# Patient Record
Sex: FEMALE | Race: WHITE | NOT HISPANIC OR LATINO | Employment: FULL TIME | URBAN - METROPOLITAN AREA
[De-identification: names, ages, dates, MRNs, and addresses within clinical notes are randomized per-mention and may not be internally consistent; named-entity substitution may affect disease eponyms.]

---

## 2017-02-09 ENCOUNTER — TELEPHONE (OUTPATIENT)
Dept: ONCOLOGY | Facility: CLINIC | Age: 37
End: 2017-02-09

## 2017-02-09 ENCOUNTER — TRANSFERRED RECORDS (OUTPATIENT)
Dept: HEALTH INFORMATION MANAGEMENT | Facility: CLINIC | Age: 37
End: 2017-02-09

## 2017-02-09 DIAGNOSIS — R07.0 THROAT PAIN: Primary | ICD-10-CM

## 2017-02-09 LAB
BACTERIA SPEC CULT: NORMAL
DEPRECATED S PYO AG THROAT QL EIA: NORMAL
MICRO REPORT STATUS: NORMAL
MICRO REPORT STATUS: NORMAL
SPECIMEN SOURCE: NORMAL
SPECIMEN SOURCE: NORMAL

## 2017-02-09 NOTE — TELEPHONE ENCOUNTER
RN called patient back and instructed; Dr Nichole would like to do an exam and lab work prior to prescribing an antibiotic. Could double book her anytime today. Patient unable to come in today but will go to PCP or urgent care after work. Discussed the importance of being seen today because having an infection while neutropenic can be life threatening and this is why Dr Nichole would like her seen today. Also advised per Dr Nichole she should have Neupogen again tomorrow. Advised if she develops a fever she should go immediately to the hospital.  Patient expressed understanding and will call back tomorrow morning with update or if she needs to make appointment.   Flower Hilton  RN, BSN, OCN

## 2017-02-09 NOTE — TELEPHONE ENCOUNTER
Patient calling to report she has a sore throat which started yesterday. Children diagnosed with strep throat. Denies fever/chills.  Patient gave herself neupogen injection this morning. She has been taking neupogen weekly but skipped last week. She is wondering if Dr Nichole will prescribe her an antibiotic or does she need to be seen by Dr ARREDONDO or PCP?  Instructed patient this RN would message Dr Nichole and call her back with recommendation.  Flower Hilton  RN, BSN, OCN

## 2017-02-10 NOTE — TELEPHONE ENCOUNTER
Patient reports she saw a provider yesterday for throat culture and cbcd. Calling to request fax number to provide results from yesterday. States rapid strep was negative but provider thought she could smell the infection and provided patient an antibiotic. Patient reports she will take another neupogen injection this morning. RN will provide faxed reports to Dr Dayanara Hilton  RN, BSN, OCN

## 2017-02-14 ENCOUNTER — TELEPHONE (OUTPATIENT)
Dept: ONCOLOGY | Facility: CLINIC | Age: 37
End: 2017-02-14

## 2017-02-14 ENCOUNTER — RADIANT APPOINTMENT (OUTPATIENT)
Dept: GENERAL RADIOLOGY | Facility: CLINIC | Age: 37
End: 2017-02-14
Attending: NURSE PRACTITIONER
Payer: COMMERCIAL

## 2017-02-14 ENCOUNTER — ONCOLOGY VISIT (OUTPATIENT)
Dept: ONCOLOGY | Facility: CLINIC | Age: 37
End: 2017-02-14
Payer: COMMERCIAL

## 2017-02-14 VITALS
OXYGEN SATURATION: 97 % | TEMPERATURE: 101.3 F | WEIGHT: 157 LBS | BODY MASS INDEX: 24.64 KG/M2 | DIASTOLIC BLOOD PRESSURE: 75 MMHG | SYSTOLIC BLOOD PRESSURE: 114 MMHG | HEIGHT: 67 IN | RESPIRATION RATE: 20 BRPM | HEART RATE: 110 BPM

## 2017-02-14 DIAGNOSIS — R50.9 FEVER, UNSPECIFIED: Primary | ICD-10-CM

## 2017-02-14 DIAGNOSIS — D70.8 AUTOIMMUNE NEUTROPENIA (H): ICD-10-CM

## 2017-02-14 DIAGNOSIS — R05.9 COUGH: ICD-10-CM

## 2017-02-14 LAB
ALBUMIN SERPL-MCNC: 3.8 G/DL (ref 3.4–5)
ALBUMIN UR-MCNC: 10 MG/DL
ALP SERPL-CCNC: 71 U/L (ref 40–150)
ALT SERPL W P-5'-P-CCNC: 25 U/L (ref 0–50)
ANION GAP SERPL CALCULATED.3IONS-SCNC: 7 MMOL/L (ref 3–14)
APPEARANCE UR: ABNORMAL
AST SERPL W P-5'-P-CCNC: 17 U/L (ref 0–45)
BACTERIA #/AREA URNS HPF: ABNORMAL /HPF
BASOPHILS # BLD AUTO: 0 10E9/L (ref 0–0.2)
BASOPHILS NFR BLD AUTO: 1 %
BILIRUB SERPL-MCNC: 0.6 MG/DL (ref 0.2–1.3)
BILIRUB UR QL STRIP: NEGATIVE
BUN SERPL-MCNC: 6 MG/DL (ref 7–30)
CALCIUM SERPL-MCNC: 8.4 MG/DL (ref 8.5–10.1)
CHLORIDE SERPL-SCNC: 106 MMOL/L (ref 94–109)
CO2 SERPL-SCNC: 28 MMOL/L (ref 20–32)
COLOR UR AUTO: YELLOW
CREAT SERPL-MCNC: 0.63 MG/DL (ref 0.52–1.04)
DIFFERENTIAL METHOD BLD: ABNORMAL
ERYTHROCYTE [DISTWIDTH] IN BLOOD BY AUTOMATED COUNT: 13 % (ref 10–15)
GFR SERPL CREATININE-BSD FRML MDRD: ABNORMAL ML/MIN/1.7M2
GLUCOSE SERPL-MCNC: 108 MG/DL (ref 70–99)
GLUCOSE UR STRIP-MCNC: NEGATIVE MG/DL
HCT VFR BLD AUTO: 38.3 % (ref 35–47)
HGB BLD-MCNC: 12.8 G/DL (ref 11.7–15.7)
HGB UR QL STRIP: ABNORMAL
KETONES UR STRIP-MCNC: 10 MG/DL
LEUKOCYTE ESTERASE UR QL STRIP: NEGATIVE
LYMPHOCYTES # BLD AUTO: 0.5 10E9/L (ref 0.8–5.3)
LYMPHOCYTES NFR BLD AUTO: 40 %
MCH RBC QN AUTO: 30.1 PG (ref 26.5–33)
MCHC RBC AUTO-ENTMCNC: 33.4 G/DL (ref 31.5–36.5)
MCV RBC AUTO: 90 FL (ref 78–100)
METAMYELOCYTES # BLD: 0 10E9/L
METAMYELOCYTES NFR BLD MANUAL: 2 %
MONOCYTES # BLD AUTO: 0.6 10E9/L (ref 0–1.3)
MONOCYTES NFR BLD AUTO: 43 %
MUCOUS THREADS #/AREA URNS LPF: PRESENT /LPF
NEUTROPHILS # BLD AUTO: 0.2 10E9/L (ref 1.6–8.3)
NEUTROPHILS NFR BLD AUTO: 14 %
NITRATE UR QL: NEGATIVE
NON-SQ EPI CELLS #/AREA URNS LPF: ABNORMAL /LPF
PH UR STRIP: 6 PH (ref 5–7)
PLATELET # BLD AUTO: 168 10E9/L (ref 150–450)
POTASSIUM SERPL-SCNC: 3.4 MMOL/L (ref 3.4–5.3)
PROT SERPL-MCNC: 7.3 G/DL (ref 6.8–8.8)
RBC # BLD AUTO: 4.25 10E12/L (ref 3.8–5.2)
RBC #/AREA URNS AUTO: ABNORMAL /HPF (ref 0–2)
SODIUM SERPL-SCNC: 141 MMOL/L (ref 133–144)
SP GR UR STRIP: 1.02 (ref 1–1.03)
URN SPEC COLLECT METH UR: ABNORMAL
UROBILINOGEN UR STRIP-MCNC: NORMAL MG/DL (ref 0–2)
WBC # BLD AUTO: 1.3 10E9/L (ref 4–11)
WBC #/AREA URNS AUTO: ABNORMAL /HPF (ref 0–2)

## 2017-02-14 PROCEDURE — 99215 OFFICE O/P EST HI 40 MIN: CPT | Mod: 25 | Performed by: NURSE PRACTITIONER

## 2017-02-14 PROCEDURE — 94640 AIRWAY INHALATION TREATMENT: CPT

## 2017-02-14 PROCEDURE — 36415 COLL VENOUS BLD VENIPUNCTURE: CPT | Performed by: INTERNAL MEDICINE

## 2017-02-14 PROCEDURE — 80053 COMPREHEN METABOLIC PANEL: CPT | Performed by: NURSE PRACTITIONER

## 2017-02-14 PROCEDURE — 87040 BLOOD CULTURE FOR BACTERIA: CPT | Performed by: NURSE PRACTITIONER

## 2017-02-14 PROCEDURE — 81001 URINALYSIS AUTO W/SCOPE: CPT | Performed by: NURSE PRACTITIONER

## 2017-02-14 PROCEDURE — 85025 COMPLETE CBC W/AUTO DIFF WBC: CPT | Performed by: INTERNAL MEDICINE

## 2017-02-14 PROCEDURE — 71020 XR CHEST 2 VW: CPT

## 2017-02-14 RX ORDER — LEVOFLOXACIN 500 MG/1
500 TABLET, FILM COATED ORAL DAILY
Qty: 7 TABLET | Refills: 0 | Status: SHIPPED | OUTPATIENT
Start: 2017-02-14 | End: 2017-05-04

## 2017-02-14 RX ORDER — ALBUTEROL SULFATE 0.83 MG/ML
2.5 SOLUTION RESPIRATORY (INHALATION) ONCE
Status: COMPLETED | OUTPATIENT
Start: 2017-02-14 | End: 2017-02-14

## 2017-02-14 RX ADMIN — ALBUTEROL SULFATE 2.5 MG: 0.83 SOLUTION RESPIRATORY (INHALATION) at 16:45

## 2017-02-14 ASSESSMENT — PAIN SCALES - GENERAL: PAINLEVEL: MODERATE PAIN (5)

## 2017-02-14 NOTE — NURSING NOTE
"Alyssa Manzano is a 36 year old female who presents for:  Chief Complaint   Patient presents with     Oncology Clinic Visit     Fever,chills,nausea,SOB        Initial Vitals:  /75 (BP Location: Right arm, Patient Position: Chair, Cuff Size: Adult Large)  Pulse 110  Temp 101.3  F (38.5  C) (Oral)  Resp 20  Ht 1.695 m (5' 6.73\")  Wt 71.2 kg (157 lb)  LMP 02/08/2017 (Approximate)  SpO2 97%  BMI 24.79 kg/m2 Estimated body mass index is 24.79 kg/(m^2) as calculated from the following:    Height as of this encounter: 1.695 m (5' 6.73\").    Weight as of this encounter: 71.2 kg (157 lb).. Body surface area is 1.83 meters squared. BP completed using cuff size: regular  Moderate Pain (5) Patient's last menstrual period was 02/08/2017 (approximate). Allergies and medications reviewed.     Medications: Medication refills not needed today.  Pharmacy name entered into IQuum:    [id number] - [facility] [address] AT [facility]  [id number] - [place], [place] - [address] [place] AT COR[address] [facility]  [id number] - [place], [place] - [address]  [id number] - [place], [place] - [address] AT SEC OF [facility] &  ( [facility])  [facility] [place] - [place], [place] - [address], [address]    Comments:     8 minutes for nursing intake (face to face time)   CRISTOBAL JEAN LPN        "

## 2017-02-14 NOTE — MR AVS SNAPSHOT
After Visit Summary   2/14/2017    Alyssa Manzano    MRN: [id number]           Patient Information     Date Of Birth          1980        Visit Information        Provider Department      2/14/2017 3:00 PM Krissy Weiss APRN CNP [facility]        Today's Diagnoses     Fever, unspecified    -  1    Autoimmune neutropenia (H)        Cough           Follow-ups after your visit        Your next 10 appointments already scheduled     Nov 17, 2017  3:45 PM CST   LAB with LAB ONC [facility])    [id number] [place] [id number] [phone]           Patient must bring picture ID.  Patient should be prepared to give a urine specimen  Please do not eat 10-12 hours before your appointment if you are coming in fasting for labs on lipids, cholesterol, or glucose (sugar).  Pregnant women should follow their Care Team instructions. Water with medications is okay. Do not drink coffee or other fluids.   If you have concerns about taking  your medications, please ask at office or if scheduling via [employer], send a message by clicking on Secure Messaging, Message Your Care Team.            Nov 17, 2017  4:30 PM CST   Return Visit with Constance Nichole MD   [facility])    [address] [address] [place] [address] [phone]              Who to contact     If you have questions or need follow up information about today's clinic visit or your schedule please contact [facility] directly at [phone].  Normal or non-critical lab and imaging results will be communicated to you by MyChart, letter or phone within 4 business days after the clinic has received the results. If you do not hear from us within 7 days, please contact the clinic through MyChart or phone. If you have a critical or abnormal lab result, we will notify you by phone as soon as  "possible.  Submit refill requests through [email or web address] or call your pharmacy and they will forward the refill request to us. Please allow 3 business days for your refill to be completed.          Additional Information About Your Visit        Paper.li Information     [email or web address] gives you secure access to your electronic health record. If you see a primary care provider, you can also send messages to your care team and make appointments. If you have questions, please call your primary care clinic.  If you do not have a primary care provider, please call [phone] and they will assist you.      [email or web address] is an electronic gateway that provides easy, online access to your medical records. With [email or web address], you can request a clinic appointment, read your test results, renew a prescription or communicate with your care team.     To access your existing account, please contact your [facility] or call [phone] for assistance.        Care EveryWhere ID     This is your Care EveryWhere ID. This could be used by other organizations to access your [facility] medical records  [id number]        Your Vitals Were     Pulse Temperature Respirations Height Last Period Pulse Oximetry    110 101.3  F (38.5  C) (Oral) 20 1.695 m (5' 6.73\") 02/08/2017 (Approximate) 97%    BMI (Body Mass Index)                   24.79 kg/m2            Blood Pressure from Last 3 Encounters:   02/14/17 114/75   11/15/16 100/69   11/03/16 98/64    Weight from Last 3 Encounters:   02/14/17 71.2 kg (157 lb)   11/15/16 76.7 kg (169 lb)   11/03/16 77.2 kg (170 lb 1.6 oz)              We Performed the Following     Blood culture     Comprehensive metabolic panel     UA reflex to Microscopic and Culture     Urine Microscopic     X-ray Chest 2 vws*          Today's Medication Changes          These changes are accurate as of: 2/14/17  8:46 PM.  If you have any questions, ask your nurse or doctor.               Start taking these medicines.  "       Dose/Directions    levofloxacin 500 MG tablet   Commonly known as:  LEVAQUIN   Used for:  Cough, Autoimmune neutropenia (H), Fever, unspecified   Started by:  Krissy Weiss APRN CNP        Dose:  500 mg   Take 1 tablet (500 mg) by mouth daily   Quantity:  7 tablet   Refills:  0            Where to get your medicines      These medications were sent to [facility] - [place], [place] - [address], [address]  [address], [facility] [address]     Phone:  [phone]     levofloxacin 500 MG tablet                Primary Care Provider Office Phone # Fax #    [facility] [phone]       [facility] [address] [facility] [address]        Thank you!     Thank you for choosing [facility]  for your care. Our goal is always to provide you with excellent care. Hearing back from our patients is one way we can continue to improve our services. Please take a few minutes to complete the written survey that you may receive in the mail after your visit with us. Thank you!             Your Updated Medication List - Protect others around you: Learn how to safely use, store and throw away your medicines at www.disposemymeds.org.          This list is accurate as of: 2/14/17  8:46 PM.  Always use your most recent med list.                   Brand Name Dispense Instructions for use    albuterol 108 (90 BASE) MCG/ACT Inhaler    PROAIR HFA/PROVENTIL HFA/VENTOLIN HFA    1 Inhaler    Inhale 2 puffs into the lungs every 4 hours as needed for shortness of breath / dyspnea or wheezing       filgrastim 480 MCG/0.8ML Sosy syringe    NEUPOGEN    8 Syringe    Inject 1 syringe or 0.8 ml once to twice weekly       levofloxacin 500 MG tablet    LEVAQUIN    7 tablet    Take 1 tablet (500 mg) by mouth daily       loratadine 10 MG tablet    CLARITIN     Take 10 mg by mouth daily Reported on 2/14/2017

## 2017-02-14 NOTE — TELEPHONE ENCOUNTER
Patient calling to report she continues to feel ill. Went to urgent care last week. Had labs drawn-WBC 2.2,ANC 1.6. Rapid strep negative. Per patient she was started on PCN. Sunday she started feeling worse with sore throat, body aches, nausea and fevers. Instructed patient she needs to be assessed. Patient in agreement and appointment made with NP today.   Flower Hilton  RN, BSN, OCN

## 2017-02-14 NOTE — PROGRESS NOTES
"February 14, 2017  S:Alyssa Manzano is a 36 year old female with history of autoimmune neutropenia presents with fevers,sore throat, cough, SOB and weakness as well as poor appetite. Pt was seen in Urgent care last week after starting with cough and feeling ill with sore throat. She was tested for strep throat which was negative and CBC completed but was placed on Penicillin 500 mg - has been taking bid since 2/9 and was to take for 10 day course. She has been more nauseated recently and has not been eating or taking fluids well. She has also been taking tylenol/ibuprofen to control the fever. She denies bowel or bladder changes, chest pain.   Her last neupogen injection of 480mcg was 2/10.    Pt reports she has 3 small children who just changed  and have been ill- one with pneumonia last week. She also has history of asthma and has albuterol but has not been using this recently.   Rest of comprehensive ROS is negative.   Chief Complaint   Patient presents with     Oncology Clinic Visit     Fever,chills,nausea,SOB     Allergies   Allergen Reactions     No Known Drug Allergies      EXAM:  /75 (BP Location: Right arm, Patient Position: Chair, Cuff Size: Adult Large)  Pulse 110  Temp 101.3  F (38.5  C) (Oral)  Resp 20  Ht 1.695 m (5' 6.73\")  Wt 71.2 kg (157 lb)  LMP 02/08/2017 (Approximate)  SpO2 97%  BMI 24.79 kg/m2  GENERAL APPEARANCE: mild distress with cough and trouble with large breaths and cooperative but states she is not feeling well.   EYES: Eyes grossly normal to inspection, PERRL and conjunctivae and sclerae normal  HENT: ear canals and TM's normal. Throat is said to be sore but not significantly red and no drainage noted.   NECK: no adenopathy, no asymmetry, masses, or scars and thyroid normal to palpation  RESP:lower lungs with wheeze but can take large breath - cough is rough. See above that O2 sats are normal. No h/o asthma  CV: regular rates and rhythm, normal S1 S2, no S3 or S4 and no " murmur, click or rub- pulse is up  ABDOMEN: soft, nontender, without hepatosplenomegaly or masses and bowel sounds normal  MS: extremities normal- no gross deformities noted  SKIN: warm and not diaphoretic  NEURO: mentation intact - appears weak but is getting around without help and was able to drive self here.   PSYCH: mentation appears normal and affect normal  Results for orders placed or performed in visit on 02/14/17   CBC with platelets differential   Result Value Ref Range    WBC 1.3 (L) 4.0 - 11.0 10e9/L    RBC Count 4.25 3.8 - 5.2 10e12/L    Hemoglobin 12.8 11.7 - 15.7 g/dL    Hematocrit 38.3 35.0 - 47.0 %    MCV 90 78 - 100 fl    MCH 30.1 26.5 - 33.0 pg    MCHC 33.4 31.5 - 36.5 g/dL    RDW 13.0 10.0 - 15.0 %    Platelet Count 168 150 - 450 10e9/L    % Neutrophils 14.0 %    % Lymphocytes 40.0 %    % Monocytes 43.0 %    % Basophils 1.0 %    % Metamyelocytes 2.0 %    Absolute Neutrophil 0.2 (LL) 1.6 - 8.3 10e9/L    Absolute Lymphocytes 0.5 (L) 0.8 - 5.3 10e9/L    Absolute Monocytes 0.6 0.0 - 1.3 10e9/L    Absolute Basophils 0.0 0.0 - 0.2 10e9/L    Absolute Metamyelocytes 0.0 0 10e9/L    Diff Method Manual Differential      Results for orders placed or performed in visit on 02/14/17   X-ray Chest 2 vws*    Narrative    PA and lateral chest x-ray    Comparisons: 4/14/2010    HISTORY: Neutropenia and cough.    FINDINGS: The lungs and pleural spaces are clear. The cardiac  silhouette and pulmonary vascularity appear unremarkable.      Impression    IMPRESSION: Clear lungs.    RASHARD BUSH MD   Comprehensive metabolic panel   Result Value Ref Range    Sodium 141 133 - 144 mmol/L    Potassium 3.4 3.4 - 5.3 mmol/L    Chloride 106 94 - 109 mmol/L    Carbon Dioxide 28 20 - 32 mmol/L    Anion Gap 7 3 - 14 mmol/L    Glucose 108 (H) 70 - 99 mg/dL    Urea Nitrogen 6 (L) 7 - 30 mg/dL    Creatinine 0.63 0.52 - 1.04 mg/dL    GFR Estimate >90  Non  GFR Calc   >60 mL/min/1.7m2    GFR Estimate If Black  >90   GFR Calc   >60 mL/min/1.7m2    Calcium 8.4 (L) 8.5 - 10.1 mg/dL    Bilirubin Total 0.6 0.2 - 1.3 mg/dL    Albumin 3.8 3.4 - 5.0 g/dL    Protein Total 7.3 6.8 - 8.8 g/dL    Alkaline Phosphatase 71 40 - 150 U/L    ALT 25 0 - 50 U/L    AST 17 0 - 45 U/L   UA reflex to Microscopic and Culture   Result Value Ref Range    Color Urine Yellow     Appearance Urine Slightly Cloudy     Glucose Urine Negative NEG mg/dL    Bilirubin Urine Negative NEG    Ketones Urine 10 (A) NEG mg/dL    Specific Gravity Urine 1.017 1.003 - 1.035    Blood Urine Moderate (A) NEG    pH Urine 6.0 5.0 - 7.0 pH    Protein Albumin Urine 10 (A) NEG mg/dL    Urobilinogen mg/dL Normal 0.0 - 2.0 mg/dL    Nitrite Urine Negative NEG    Leukocyte Esterase Urine Negative NEG    Source Midstream Urine    Urine Microscopic   Result Value Ref Range    WBC Urine O - 2 0 - 2 /HPF    RBC Urine 5-10 (A) 0 - 2 /HPF    Bacteria Urine Few (A) NEG /HPF    Squamous Epithelial /LPF Urine Moderate (A) FEW /LPF    Mucous Urine Present (A) NEG /LPF     A:(R50.9) Fever, Neutropenic (primary encounter diagnosis)  Comment: Pt has been on  mg bid x 4 days  Plan: Comprehensive metabolic panel, UA reflex to         Microscopic and Culture, X-ray Chest 2 vws*,         Blood culture  Completed review for fever and ruled out pneumonia, urinary infection and blood cultures to be reviewed as able. She has been on PCN only and fever remains elevated with symptoms. We were unable to offer fluids and antibiotic today due to very late in day.  Pt was given choice to go to hospital but chose to go home with a more wide spectrum antibiotic- Levofloxacin 500mg daily x 7 - to start today. She is to increase fluids and take temperature bid with continued use of tylenol and ibuprofen. If not able to control fevers to <102, increase or change in symptoms, or unable to keep fluids down she needs to go to ER immediately.   Suggest recheck Friday with CBC- may be here or  with pcp.Should also have recheck after resolution of illness to see if neutropenia is controlled with weekly dosing or further treatment necessary.     (D70.8) Autoimmune neutropenia (H)  Comment: currently using Neupogen 480 mcg SQ weekly  Plan: Comprehensive metabolic panel, UA reflex to         Microscopic and Culture, X-ray Chest 2 vws*,         Blood culture  Will ask her to take extra Neupogen injection today and will again be due for weekly dosing on Friday again.    (R05) Cough with history of asthma  Comment: Chest xray noted to be clear   Plan: Comprehensive metabolic panel, X-ray Chest 2         vws*, Blood culture  Pt was given albuterol neb in office and pt felt this was helpful. She is to continue use of personal albuterol inhaler at home up to 4 x daily.  TT=35 min With >50% in education and management of concerns.   Krissy Weiss,Cnp

## 2017-02-15 ENCOUNTER — TELEPHONE (OUTPATIENT)
Dept: ONCOLOGY | Facility: CLINIC | Age: 37
End: 2017-02-15

## 2017-02-15 DIAGNOSIS — D70.8 AUTOIMMUNE NEUTROPENIA (H): ICD-10-CM

## 2017-02-15 NOTE — TELEPHONE ENCOUNTER
TC to pt to check on her after clinic visit yesterday with neutropenia. Pt states she is feeling much better today and has no fever after starting new antibiotic and pushing fluids. She confirms that she did take the extra Neupogen injection yesterday as well. Discussed that we recommend Neupogen 2 - 3 x weekly and pt agreed to trial 2 x weekly- next shot due on Friday2/17. She will continue to watch for fevers and complete all antibiotics as ordered. New prescription for Neupogen 2 x weekly sent to pharmacy. Oscar,CNP

## 2017-02-20 LAB
BACTERIA SPEC CULT: NO GROWTH
Lab: NORMAL
MICRO REPORT STATUS: NORMAL
SPECIMEN SOURCE: NORMAL

## 2017-04-11 ENCOUNTER — TRANSFERRED RECORDS (OUTPATIENT)
Dept: HEALTH INFORMATION MANAGEMENT | Facility: CLINIC | Age: 37
End: 2017-04-11

## 2017-05-04 ENCOUNTER — OFFICE VISIT (OUTPATIENT)
Dept: FAMILY MEDICINE | Facility: CLINIC | Age: 37
End: 2017-05-04
Payer: COMMERCIAL

## 2017-05-04 VITALS
HEART RATE: 84 BPM | DIASTOLIC BLOOD PRESSURE: 67 MMHG | OXYGEN SATURATION: 99 % | WEIGHT: 142 LBS | TEMPERATURE: 100 F | BODY MASS INDEX: 22.29 KG/M2 | HEIGHT: 67 IN | SYSTOLIC BLOOD PRESSURE: 103 MMHG

## 2017-05-04 DIAGNOSIS — D70.8 AUTOIMMUNE NEUTROPENIA (H): ICD-10-CM

## 2017-05-04 DIAGNOSIS — J20.9 ACUTE BRONCHITIS, UNSPECIFIED ORGANISM: Primary | ICD-10-CM

## 2017-05-04 PROCEDURE — 99214 OFFICE O/P EST MOD 30 MIN: CPT | Performed by: NURSE PRACTITIONER

## 2017-05-04 RX ORDER — AZITHROMYCIN 250 MG/1
TABLET, FILM COATED ORAL
Qty: 6 TABLET | Refills: 0 | Status: SHIPPED | OUTPATIENT
Start: 2017-05-04 | End: 2017-06-06

## 2017-05-04 ASSESSMENT — PAIN SCALES - GENERAL: PAINLEVEL: MODERATE PAIN (4)

## 2017-05-04 NOTE — NURSING NOTE
"Chief Complaint   Patient presents with     Sinus Problem       Initial /67  Pulse 84  Temp 100  F (37.8  C) (Temporal)  Ht 5' 7\" (1.702 m)  Wt 142 lb (64.4 kg)  LMP 04/13/2017 (Approximate)  SpO2 99%  BMI 22.24 kg/m2 Estimated body mass index is 22.24 kg/(m^2) as calculated from the following:    Height as of this encounter: 5' 7\" (1.702 m).    Weight as of this encounter: 142 lb (64.4 kg).  Medication Reconciliation: complete       Jovan Saucedo MA    "

## 2017-05-04 NOTE — MR AVS SNAPSHOT
After Visit Summary   5/4/2017    Alyssa Manzano    MRN: [id number]           Patient Information     Date Of Birth          1980        Visit Information        Provider Department      5/4/2017 11:40 AM Tawana Handley APRN CNP [facility]        Today's Diagnoses     Acute bronchitis, unspecified organism    -  1    Autoimmune neutropenia (H)           Follow-ups after your visit        Your next 10 appointments already scheduled     Nov 17, 2017  3:45 PM CST   LAB with LAB ONC [facility])    [address] [place] [address] [phone]           Patient must bring picture ID.  Patient should be prepared to give a urine specimen  Please do not eat 10-12 hours before your appointment if you are coming in fasting for labs on lipids, cholesterol, or glucose (sugar).  Pregnant women should follow their Care Team instructions. Water with medications is okay. Do not drink coffee or other fluids.   If you have concerns about taking  your medications, please ask at office or if scheduling via [employer], send a message by clicking on Secure Messaging, Message Your Care Team.            Nov 17, 2017  4:30 PM CST   Return Visit with Constance Nichole MD   [facility])    [address] [place] [address] [phone]              Who to contact     If you have questions or need follow up information about today's clinic visit or your schedule please contact [facility] directly at [phone].  Normal or non-critical lab and imaging results will be communicated to you by MyChart, letter or phone within 4 business days after the clinic has received the results. If you do not hear from us within 7 days, please contact the clinic through MyChart or phone. If you have a critical or abnormal lab result, we will notify you by phone as soon as  "possible.  Submit refill requests through Member [employer] Program or call your pharmacy and they will forward the refill request to us. Please allow 3 business days for your refill to be completed.          Additional Information About Your Visit        [employer] Information     Member Savings Program gives you secure access to your electronic health record. If you see a primary care provider, you can also send messages to your care team and make appointments. If you have questions, please call your primary care clinic.  If you do not have a primary care provider, please call [phone] and they will assist you.        Care EveryWhere ID     This is your Care EveryWhere ID. This could be used by other organizations to access your [facility] medical records  [id number]        Your Vitals Were     Pulse Temperature Height Last Period Pulse Oximetry BMI (Body Mass Index)    84 100  F (37.8  C) (Temporal) 5' 7\" (1.702 m) 04/13/2017 (Approximate) 99% 22.24 kg/m2       Blood Pressure from Last 3 Encounters:   05/04/17 103/67   02/14/17 114/75   11/15/16 100/69    Weight from Last 3 Encounters:   05/04/17 142 lb (64.4 kg)   02/14/17 157 lb (71.2 kg)   11/15/16 169 lb (76.7 kg)              Today, you had the following     No orders found for display         Today's Medication Changes          These changes are accurate as of: 5/4/17 11:59 PM.  If you have any questions, ask your nurse or doctor.               Start taking these medicines.        Dose/Directions    azithromycin 250 MG tablet   Commonly known as:  ZITHROMAX   Used for:  Acute bronchitis, unspecified organism   Started by:  Tawana Handley APRN CNP        2 tablets daily on day one, then one tablet po daily until gone.   Quantity:  6 tablet   Refills:  0            Where to get your medicines      Some of these will need a paper prescription and others can be bought over the counter.  Ask your nurse if you have questions.     Bring a paper prescription for each of these medications     " azithromycin 250 MG tablet                Primary Care Provider Office Phone # Fax #    [facility] [phone]       [facility] [address] [facility] [address]        Thank you!     Thank you for choosing [facility]  for your care. Our goal is always to provide you with excellent care. Hearing back from our patients is one way we can continue to improve our services. Please take a few minutes to complete the written survey that you may receive in the mail after your visit with us. Thank you!             Your Updated Medication List - Protect others around you: Learn how to safely use, store and throw away your medicines at www.disposemymeds.org.          This list is accurate as of: 5/4/17 11:59 PM.  Always use your most recent med list.                   Brand Name Dispense Instructions for use    albuterol 108 (90 BASE) MCG/ACT Inhaler    PROAIR HFA/PROVENTIL HFA/VENTOLIN HFA    1 Inhaler    Inhale 2 puffs into the lungs every 4 hours as needed for shortness of breath / dyspnea or wheezing       azithromycin 250 MG tablet    ZITHROMAX    6 tablet    2 tablets daily on day one, then one tablet po daily until gone.       filgrastim 480 MCG/0.8ML Sosy syringe    NEUPOGEN    8 Syringe    Inject 1 syringe or 0.8 ml  twice weekly       loratadine 10 MG tablet    CLARITIN     Take 10 mg by mouth daily Reported on 2/14/2017

## 2017-05-04 NOTE — PROGRESS NOTES
SUBJECTIVE:                                                    Alyssa Manzano is a 36 year old female who presents to clinic today for the following health issues:      Acute Illness   Acute illness concerns: sinus   Onset: 4 days     Fever: YES- 99    Chills/Sweats: YES    Headache (location?): YES    Sinus Pressure:YES- post-nasal drainage and facial pain    Conjunctivitis:  no    Ear Pain: no    Rhinorrhea: YES    Congestion: YES    Sore Throat: no, had it on Monday      Cough: YES-non-productive, productive of yellow sputum, productive of green sputum    Wheeze: no     Decreased Appetite: no    Nausea: no    Vomiting: no    Diarrhea:  no    Dysuria/Freq.: no    Fatigue/Achiness: YES    Sick/Strep Exposure: no      Therapies Tried and outcome: tylenol and ibuprofen, claritin, resting       Patient reports experiencing mild cough and moderate nasal congestion. She states that a friend of her has been have had similar symptoms, and she feels she may have gotten it from her. She takes neupogen injections for neutropenia that is chronic. She reports decreased appetite, but she has been going to work, eating and drinking. She denies rash, or measles exposure/contact.    Problem list and histories reviewed & adjusted, as indicated.  Additional history: as documented    Patient Active Problem List   Diagnosis     Chronic rhinitis     Abnormal Pap smear, low grade squamous intraepithelial lesion (LGSIL)     CARDIOVASCULAR SCREENING; LDL GOAL LESS THAN 160     Urticaria     Autoimmune neutropenia (H)     Generalized anxiety disorder     Excessive or frequent menstruation     Past Surgical History:   Procedure Laterality Date     C/SECTION, LOW TRANSVERSE  2013, 2014     LAPAROSCOPIC HERNIORRHAPHY UMBILICAL N/A 3/31/2015    Procedure: LAPAROSCOPIC HERNIORRHAPHY UMBILICAL;  Surgeon: Bigg Mirza MD;  Location: [facility] OR     vaginal [facility]  2011       Social History   Substance Use Topics     Smoking status:  Former Smoker     Smokeless tobacco: Never Used      Comment: Has an occasional cigarette. 2 cigs per months.     Alcohol use Yes      Comment: 4 drinks per week     Family History   Problem Relation Age of Onset     CANCER Maternal Grandfather      lung, smoker     HEART DISEASE Maternal Grandfather      CAD age 70's  age 81     C.A.D. Maternal Grandfather      81     CEREBROVASCULAR DISEASE Maternal Grandfather      DIABETES Maternal Grandfather      CEREBROVASCULAR DISEASE Paternal Grandfather      Multiple CVA     Breast Cancer Paternal Grandmother      CANCER Paternal Grandmother      DIABETES Father      Asthma No family hx of      Hypertension No family hx of      Depression No family hx of      Thyroid Disease No family hx of      Alcohol/Drug No family hx of      Cancer - colorectal No family hx of      Prostate Cancer No family hx of      Allergies No family hx of      Arthritis No family hx of      Alzheimer Disease No family hx of      Anesthesia Reaction No family hx of      Coronary Artery Disease No family hx of      Ovarian Cancer No family hx of      Other Cancer No family hx of      Mental Illness No family hx of      OSTEOPOROSIS No family hx of      Known Genetic Syndrome No family hx of      Obesity No family hx of      Unknown/Adopted No family hx of      Anxiety Disorder No family hx of      MENTAL ILLNESS No family hx of      Substance Abuse No family hx of      Genetic Disorder No family hx of      Hyperlipidemia No family hx of      Colon Cancer No family hx of          Current Outpatient Prescriptions   Medication Sig Dispense Refill     azithromycin (ZITHROMAX) 250 MG tablet 2 tablets daily on day one, then one tablet po daily until gone. 6 tablet 0     filgrastim (NEUPOGEN) 480 MCG/0.8ML SOSY syringe Inject 1 syringe or 0.8 ml  twice weekly 8 Syringe 1     albuterol (PROAIR HFA, PROVENTIL HFA, VENTOLIN HFA) 108 (90 BASE) MCG/ACT inhaler Inhale 2 puffs into the lungs every 4 hours as  "needed for shortness of breath / dyspnea or wheezing 1 Inhaler 1     loratadine (CLARITIN) 10 MG tablet Take 10 mg by mouth daily Reported on 2/14/2017         Reviewed and updated as needed this visit by clinical staff  Tobacco  Allergies  Meds  Problems  Med Hx  Surg Hx  Fam Hx  Soc Hx        Reviewed and updated as needed this visit by Provider  Allergies  Meds  Problems         ROS:  Constitutional, HEENT, cardiovascular, pulmonary, gi and gu systems are negative, except as otherwise noted.    This document serves as a record of the services and decisions personally performed and made by Tawana Handley DNP. It was created on her behalf by Nasra Giron, a trained medical scribe. The creation of this document is based on the provider's statements to the medical scribe.  Nasra Giron 3:21 PM May 4, 2017    OBJECTIVE:                                                    /67  Pulse 84  Temp 100  F (37.8  C) (Temporal)  Ht 5' 7\" (1.702 m)  Wt 142 lb (64.4 kg)  LMP 04/13/2017 (Approximate)  SpO2 99%  BMI 22.24 kg/m2  Body mass index is 22.24 kg/(m^2).  GENERAL APPEARANCE: healthy, alert and no distress, non toxic, but fatigued  EYES: Eyes grossly normal to inspection and conjunctivae and sclerae normal  HENT: ear canals and TM's normal, nose and mouth without ulcers or lesions and nasal mucosa edematous with moderate rhinorrhea  NECK: no adenopathy, no asymmetry, masses, or scars and thyroid normal to palpation  RESP: lungs clear to auscultation - no rales, rhonchi or wheezes  CV: regular rates and rhythm, normal S1 S2, no S3 or S4 and no murmur, click or rub    Diagnostic test results:  No results found for this or any previous visit (from the past 24 hour(s)).     ASSESSMENT/PLAN:                                                        ICD-10-CM    1. Acute bronchitis, unspecified organism J20.9 azithromycin (ZITHROMAX) 250 MG tablet   2. Autoimmune neutropenia (H) D70.8        Likely " viral bronchitis, discussed risk with neutropenia. Deferring antibiotic treatment for 1-2 days. May need further evaluation if worse with labs. Deferring today. Has neutropenia, treated with Neupogen. Antiobiotic see orders. Return to clinic if symptoms persist or worsen. OTC's antypyretics/analgesics prn, fluids, rest, supportive cares. OTC decongestants, prn. Will broaden work-up if worse, low threshold for this.     Follow up with Provider - If worsening     The information in this document, created by the medical scribe for me, accurately reflects the services I personally performed and the decisions made by me. I have reviewed and approved this document for accuracy prior to leaving the patient care area.  May 4, 2017 3:21 PM    EMMA Langley [facility]

## 2017-05-09 ENCOUNTER — TELEPHONE (OUTPATIENT)
Dept: FAMILY MEDICINE | Facility: CLINIC | Age: 37
End: 2017-05-09

## 2017-05-09 NOTE — TELEPHONE ENCOUNTER
Last pap with [facility] is 2/2010. Please notify pt. To have this repeated there or her at  as it is a bit overdue, and the last one was abnormal that I can see-.   Tawana Handley

## 2017-05-09 NOTE — TELEPHONE ENCOUNTER
Left message asking patient to return call.  Please inform patient of message from Provider below.   Please assist in scheduling patient.

## 2017-05-09 NOTE — TELEPHONE ENCOUNTER
Patient returned call and was given message below. Patient states she will go to [place] and ave this done.    Thank you Mary Ellen

## 2017-06-01 NOTE — PROGRESS NOTES
[facility]  [address], [address]  [place] [address]  [phone]  Dept: [phone]    PRE-OP EVALUATION:  Today's date: 2017    Alyssa Manzano (: 1980) presents for pre-operative evaluation assessment as requested by Dr. Guidry.  She requires evaluation and anesthesia risk assessment prior to undergoing surgery/procedure for treatment of umbilical and right inguinal hernias.  Proposed procedure: hernia repair X 2 with Davinci    Date of Surgery/ Procedure: 17  Time of Surgery/ Procedure: 9:00 am   Hospital/Surgical Facility: [facility] - overnight   Fax number for surgical facility:   Primary Physician: Tawana Handley  Type of Anesthesia Anticipated: General    Patient has a Health Care Directive or Living Will:  NO    1. NO - Do you have a history of heart attack, stroke, stent, bypass or surgery on an artery in the head, neck, heart or legs?  2. NO - Do you ever have any pain or discomfort in your chest?  3. NO - Do you have a history of  Heart Failure?  4. NO- ARE YOUR TROUBLED BY SHORTNESS OF BREATH WHEN WALKING ON THE LEVEL, UP A SLIGHT HILL OR AT NIGHT?   5. NO - Do you currently have a cold, bronchitis or other respiratory infection?  6. NO - Do you have a cough, shortness of breath or wheezing?  7. NO - Do you sometimes get pains in the calves of your legs when you walk?  8. YES - DO YOU OR ANYONE IN YOUR FAMILY HAVE PREVIOUS HISTORY OF BLOOD CLOTS? PaGF, MaGF, MaGm  9. NO - Do you or does anyone in your family have a serious bleeding problem such as prolonged bleeding following surgeries or cuts?  10. NO - Have you ever had problems with anemia or been told to take iron pills?  11. NO - Have you had any abnormal blood loss such as black, tarry or bloody stools, or abnormal vaginal bleeding?  12. NO - Have you ever had a blood transfusion?  13. NO - Have you or any of your relatives ever had problems with anesthesia?  14. NO - Do you have sleep  apnea, excessive snoring or daytime drowsiness?  15. NO - Do you have any prosthetic heart valves?  16. NO - Do you have prosthetic joints?  17. NO - Is there any chance that you may be pregnant?      HPI:                                                      Brief HPI related to upcoming procedure: previous surgical repair of umbilical hernia, recurrence, and new right inguinal hernia      See problem list for active medical problems.  Problems all longstanding and stable, except as noted/documented.  See ROS for pertinent symptoms related to these conditions.                                                                                                  .    MEDICAL HISTORY:                                                      Patient Active Problem List    Diagnosis Date Noted     Intermittent asthma, uncomplicated 06/06/2017     Priority: Medium     Umbilical hernia without obstruction and without gangrene 06/06/2017     Priority: Medium     Bilateral inguinal hernia without obstruction or gangrene, recurrence not specified 06/06/2017     Priority: Medium     Diastasis recti 06/06/2017     Priority: Medium     Excessive or frequent menstruation 11/23/2015     Priority: Medium     Generalized anxiety disorder 01/09/2014     Priority: Medium     Diagnosis updated by automated process. Provider to review and confirm.       Autoimmune neutropenia (H) 01/27/2012     Priority: Medium     Urticaria 05/31/2011     Priority: Medium     CARDIOVASCULAR SCREENING; LDL GOAL LESS THAN 160 10/31/2010     Priority: Medium     Chronic rhinitis 08/04/2008     Priority: Medium     Abnormal Pap smear, low grade squamous intraepithelial lesion (LGSIL) 08/04/2008     Priority: Medium     3/2/07 LSIL  8/4/08 pap LSIL/ cannot exclude HSIL.  8/26/08 colpo- NINA 1. Plan: cotest in 1 year.  3/18/09 pap NIL  9/29/09 pap NIL.  Plan- f/u 1 year  1/9/14 NIL pap/neg HR HPV. Plan: pap in 3 years.  11/3/16 NIL pap/neg HR HPV. Plan:              Past Medical History:   Diagnosis Date     Arthritis      Benign neoplasm of skin, site unspecified      Cancer (H)      Cerebral infarction (H)      Congestive heart failure (H)      Depressive disorder      Diabetes (H)      KAYY (generalised anxiety disorder)      Heart disease 2017    2 uncles     Other abnormal Papanicolaou smear of cervix and cervical HPV(795.09)      Other decreased white blood cell count 2005    seeing hematologist     Other neutropenia (H) 2005    Autoimmune, teated with neupogen injections     Seasonal allergies     uses albuterol prn     Uncomplicated asthma      Past Surgical History:   Procedure Laterality Date     ABDOMEN SURGERY       BIOPSY       C/SECTION, LOW TRANSVERSE  2013, 2014     LAPAROSCOPIC HERNIORRHAPHY UMBILICAL N/A 3/31/2015    Procedure: LAPAROSCOPIC HERNIORRHAPHY UMBILICAL;  Surgeon: Bigg Mirza MD;  Location: [facility] OR     vaginal dellivery  2011     Current Outpatient Prescriptions   Medication Sig Dispense Refill     filgrastim (NEUPOGEN) 480 MCG/0.8ML SOSY syringe Inject 1 syringe or 0.8 ml  twice weekly 8 Syringe 0     albuterol (PROAIR HFA/PROVENTIL HFA/VENTOLIN HFA) 108 (90 BASE) MCG/ACT Inhaler Inhale 2 puffs into the lungs every 4 hours as needed for shortness of breath / dyspnea or wheezing 1 Inhaler 1     [DISCONTINUED] albuterol (PROAIR HFA, PROVENTIL HFA, VENTOLIN HFA) 108 (90 BASE) MCG/ACT inhaler Inhale 2 puffs into the lungs every 4 hours as needed for shortness of breath / dyspnea or wheezing 1 Inhaler 1     loratadine (CLARITIN) 10 MG tablet Take 10 mg by mouth daily Reported on 2/14/2017       OTC products: NSAIDS    Allergies   Allergen Reactions     No Known Drug Allergies       Latex Allergy: NO    Social History   Substance Use Topics     Smoking status: Former Smoker     Years: 1.00     Types: Cigarettes     Smokeless tobacco: Never Used      Comment: Has an occasional cigarette. 2 cigs per months.     Alcohol use Yes      Comment: 4  "drinks per week     History   Drug Use No       REVIEW OF SYSTEMS:                                                    Constitutional, neuro, ENT, endocrine, pulmonary, cardiac, gastrointestinal, genitourinary, musculoskeletal, integument and psychiatric systems are negative, except as otherwise noted.    EXAM:                                                    /66  Pulse 88  Temp 99  F (37.2  C) (Temporal)  Resp 16  Ht 5' 7\" (1.702 m)  Wt 142 lb (64.4 kg)  LMP 05/16/2017 (Approximate)  BMI 22.24 kg/m2    GENERAL APPEARANCE: healthy, alert and no distress     EYES: EOMI, PERRL     HENT: ear canals and TM's normal and nose and mouth without ulcers or lesions     NECK: no adenopathy, no asymmetry, masses, or scars and thyroid normal to palpation     RESP: lungs clear to auscultation - no rales, rhonchi or wheezes     CV: regular rates and rhythm, normal S1 S2, no S3 or S4 and no murmur, click or rub     ABDOMEN: soft, non-tender, surgical scars noted. Umbilical and right inguinal hernias noted with diastasis recti, normal BS     MS: extremities normal- no gross deformities noted, no evidence of inflammation in joints, FROM in all extremities.     SKIN: no suspicious lesions or rashes     NEURO: Normal strength and tone, sensory exam grossly normal, mentation intact and speech normal     PSYCH: mentation appears normal. and affect normal/bright    DIAGNOSTICS:                                                    CBC/Hemoglobin (indicated for history of anemia or procedure with significant blood loss such as tonsillectomy, major intraperitoneal surgery, vascular surgery, major spine surgery, total joint replacement)    Recent Labs   Lab Test  02/14/17   1442  11/15/16   1549  03/10/15   05/24/11   1037   HGB  12.8  13.8   < >   --    < >  11.4*   PLT  168  216   < >   --    < >  176   NA  141   --    --    --    --   140   POTASSIUM  3.4   --    --   4.0   < >  4.0   CR  0.63   --    --   0.83   < >  0.58    < " > = values in this interval not displayed.        IMPRESSION:                                                    Reason for surgery/procedure: umbilical hernia, diastasis recti, right inguinal hernia  Diagnosis/reason for consult: pre-op clearance, auto-immune neutropenia, intermittent asthma.    The proposed surgical procedure is considered INTERMEDIATE risk.    REVISED CARDIAC RISK INDEX  The patient has the following serious cardiovascular risks for perioperative complications such as (MI, PE, VFib and 3  AV Block):  No serious cardiac risks  INTERPRETATION: 0 risks: Class I (very low risk - 0.4% complication rate)    The patient has the following additional risks for perioperative complications:  Auto-immune neutropenia      ICD-10-CM    1. Preop general physical exam Z01.818 CBC with platelets and differential   2. Autoimmune neutropenia (H) D70.8 filgrastim (NEUPOGEN) 480 MCG/0.8ML SOSY syringe     CBC with platelets and differential   3. Intermittent asthma, uncomplicated J45.20 Asthma Action Plan (AAP)     albuterol (PROAIR HFA/PROVENTIL HFA/VENTOLIN HFA) 108 (90 BASE) MCG/ACT Inhaler   4. Umbilical hernia without obstruction and without gangrene K42.9    5. Bilateral inguinal hernia without obstruction or gangrene, recurrence not specified K40.20    6. Diastasis recti M62.08        RECOMMENDATIONS:                                                        Auto-immune Neutropenia  Will review with hematology- per 2015 guideline is to do Neupogen SC 2 days prior to surgery.Pt. Has been off her Neupogen for over 2 months, previously taken as needed about 2 times/month. CBC re-check in 5 days- okay for surgery.     --Patient is to take all scheduled medications on the day of surgery EXCEPT for modifications listed below.    Anticoagulant or Antiplatelet Medication Use  NSAIDS: Ibuprofen (Motrin):         Stop one day prior to surgery        Maximize asthma medications 7 days prior to surgery.  AAP updated.      APPROVAL GIVEN to proceed with proposed procedure, without further diagnostic evaluation       Signed Electronically by: EMMA Langley CNP    Copy of this evaluation report is provided to requesting physician.    [place] Preop Guidelines

## 2017-06-06 ENCOUNTER — OFFICE VISIT (OUTPATIENT)
Dept: FAMILY MEDICINE | Facility: CLINIC | Age: 37
End: 2017-06-06
Payer: COMMERCIAL

## 2017-06-06 VITALS
HEIGHT: 67 IN | DIASTOLIC BLOOD PRESSURE: 66 MMHG | RESPIRATION RATE: 16 BRPM | WEIGHT: 142 LBS | HEART RATE: 88 BPM | TEMPERATURE: 99 F | SYSTOLIC BLOOD PRESSURE: 108 MMHG | BODY MASS INDEX: 22.29 KG/M2

## 2017-06-06 DIAGNOSIS — K40.20 BILATERAL INGUINAL HERNIA WITHOUT OBSTRUCTION OR GANGRENE, RECURRENCE NOT SPECIFIED: ICD-10-CM

## 2017-06-06 DIAGNOSIS — M62.08 DIASTASIS RECTI: ICD-10-CM

## 2017-06-06 DIAGNOSIS — Z01.818 PREOP GENERAL PHYSICAL EXAM: Primary | ICD-10-CM

## 2017-06-06 DIAGNOSIS — K42.9 UMBILICAL HERNIA WITHOUT OBSTRUCTION AND WITHOUT GANGRENE: ICD-10-CM

## 2017-06-06 DIAGNOSIS — J45.20 INTERMITTENT ASTHMA, UNCOMPLICATED: ICD-10-CM

## 2017-06-06 DIAGNOSIS — D70.8 AUTOIMMUNE NEUTROPENIA (H): ICD-10-CM

## 2017-06-06 LAB
BASOPHILS # BLD AUTO: 0 10E9/L (ref 0–0.2)
BASOPHILS NFR BLD AUTO: 1.1 %
DIFFERENTIAL METHOD BLD: ABNORMAL
EOSINOPHIL # BLD AUTO: 0.1 10E9/L (ref 0–0.7)
EOSINOPHIL NFR BLD AUTO: 4.3 %
ERYTHROCYTE [DISTWIDTH] IN BLOOD BY AUTOMATED COUNT: 13 % (ref 10–15)
HCT VFR BLD AUTO: 37.3 % (ref 35–47)
HGB BLD-MCNC: 12.4 G/DL (ref 11.7–15.7)
LYMPHOCYTES # BLD AUTO: 0.9 10E9/L (ref 0.8–5.3)
LYMPHOCYTES NFR BLD AUTO: 49.7 %
MCH RBC QN AUTO: 31.1 PG (ref 26.5–33)
MCHC RBC AUTO-ENTMCNC: 33.2 G/DL (ref 31.5–36.5)
MCV RBC AUTO: 94 FL (ref 78–100)
MONOCYTES # BLD AUTO: 0.4 10E9/L (ref 0–1.3)
MONOCYTES NFR BLD AUTO: 19.3 %
NEUTROPHILS # BLD AUTO: 0.5 10E9/L (ref 1.6–8.3)
NEUTROPHILS NFR BLD AUTO: 25.6 %
PLATELET # BLD AUTO: 221 10E9/L (ref 150–450)
RBC # BLD AUTO: 3.99 10E12/L (ref 3.8–5.2)
WBC # BLD AUTO: 1.9 10E9/L (ref 4–11)

## 2017-06-06 PROCEDURE — 36415 COLL VENOUS BLD VENIPUNCTURE: CPT | Performed by: NURSE PRACTITIONER

## 2017-06-06 PROCEDURE — 85025 COMPLETE CBC W/AUTO DIFF WBC: CPT | Performed by: NURSE PRACTITIONER

## 2017-06-06 PROCEDURE — 99215 OFFICE O/P EST HI 40 MIN: CPT | Performed by: NURSE PRACTITIONER

## 2017-06-06 RX ORDER — ALBUTEROL SULFATE 90 UG/1
2 AEROSOL, METERED RESPIRATORY (INHALATION) EVERY 4 HOURS PRN
Qty: 1 INHALER | Refills: 1 | Status: SHIPPED | OUTPATIENT
Start: 2017-06-06 | End: 2018-11-09

## 2017-06-06 ASSESSMENT — PAIN SCALES - GENERAL: PAINLEVEL: NO PAIN (0)

## 2017-06-06 NOTE — MR AVS SNAPSHOT
After Visit Summary   6/6/2017    Alyssa Manzano    MRN: [id number]           Patient Information     Date Of Birth          1980        Visit Information        Provider Department      6/6/2017 4:00 PM Tawana Handley APRN [facility]        Today's Diagnoses     Preop general physical exam    -  1    Autoimmune neutropenia (H)        Intermittent asthma, uncomplicated        Umbilical hernia without obstruction and without gangrene        Bilateral inguinal hernia without obstruction or gangrene, recurrence not specified        Diastasis recti          Care Instructions      Before Your Surgery      Call your surgeon if there is any change in your health. This includes signs of a cold or flu (such as a sore throat, runny nose, cough, rash or fever).    Do not smoke, drink alcohol or take over the counter medicine (unless your surgeon or primary care doctor tells you to) for the 24 hours before and after surgery.    If you take prescribed drugs: Follow your doctor s orders about which medicines to take and which to stop until after surgery.    Eating and drinking prior to surgery: follow the instructions from your surgeon    Take a shower or bath the night before surgery. Use the soap your surgeon gave you to gently clean your skin. If you do not have soap from your surgeon, use your regular soap. Do not shave or scrub the surgery site.  Wear clean pajamas and have clean sheets on your bed.           Follow-ups after your visit        Your next 10 appointments already scheduled     Nov 17, 2017  3:45 PM CST   LAB with [facility] ([facility])    [address] [place] [address] [phone]           Patient must bring picture ID.  Patient should be prepared to give a urine specimen  Please do not eat 10-12 hours before your appointment if you are coming in fasting for labs on lipids, cholesterol, or glucose  (sugar).  Pregnant women should follow their Care Team instructions. Water with medications is okay. Do not drink coffee or other fluids.   If you have concerns about taking  your medications, please ask at office or if scheduling via [employer], send a message by clicking on Secure Messaging, Message Your Care Team.            Nov 17, 2017  4:30 PM CST   [facility] with Con[facility]ole MD   [facility]    [address] [place] [id number] [phone]              Who to contact     If you have questions or need follow up information about today's clinic visit or your schedule please contact [facility] directly at [phone].  Normal or non-critical lab and imaging results will be communicated to you by [employer], letter or phone within 4 business days after the clinic has received the results. If you do not hear from us within 7 days, please contact the clinic through [employer] or phone. If you have a critical or abnormal lab result, we will notify you by phone as soon as possible.  Submit refill requests through [employer] or call your pharmacy and they will forward the refill request to us. Please allow 3 business days for your refill to be completed.          Additional Information About Your Visit        [employer] Information     [employer] gives you secure access to your electronic health record. If you see a primary care provider, you can also send messages to your care team and make appointments. If you have questions, please call your primary care clinic.  If you do not have a primary care provider, please call [phone] and they will assist you.        Care EveryWhere ID     This is your Care EveryWhere ID. This could be used by other organizations to access your [place] medical records  [id number]        Your Vitals Were     Pulse Temperature Respirations Height Last Period BMI (Body Mass Index)    88 99  F (37.2  C) (Temporal) 16  "5' 7\" (1.702 m) 05/16/2017 (Approximate) 22.24 kg/m2       Blood Pressure from Last 3 Encounters:   06/06/17 108/66   05/04/17 103/67   02/14/17 114/75    Weight from Last 3 Encounters:   06/06/17 142 lb (64.4 kg)   05/04/17 142 lb (64.4 kg)   02/14/17 157 lb (71.2 kg)              We Performed the Following     Asthma Action Plan (AAP)     CBC with platelets and differential          Where to get your medicines      These medications were sent to [facility] - [place], [place] - [address], [address]  [address], [address], [facility] [address]     Phone:  [phone]     albuterol 108 (90 BASE) MCG/ACT Inhaler    filgrastim 480 MCG/0.8ML Sosy syringe          Primary Care Provider Office Phone # Fax #    EMMA Parr [facility] [phone]       [facility] [address] [address] [address]        Thank you!     Thank you for choosing [facility]  for your care. Our goal is always to provide you with excellent care. Hearing back from our patients is one way we can continue to improve our services. Please take a few minutes to complete the written survey that you may receive in the mail after your visit with us. Thank you!             Your Updated Medication List - Protect others around you: Learn how to safely use, store and throw away your medicines at www.disposemymeds.org.          This list is accurate as of: 6/6/17  4:46 PM.  Always use your most recent med list.                   Brand Name Dispense Instructions for use    albuterol 108 (90 BASE) MCG/ACT Inhaler    PROAIR HFA/PROVENTIL HFA/VENTOLIN HFA    1 Inhaler    Inhale 2 puffs into the lungs every 4 hours as needed for shortness of breath / dyspnea or wheezing       filgrastim 480 MCG/0.8ML Sosy syringe    NEUPOGEN    8 Syringe    Inject 1 syringe or 0.8 ml  twice weekly       loratadine 10 MG tablet    CLARITIN     Take 10 mg by mouth daily Reported on 2/14/2017         "

## 2017-06-06 NOTE — LETTER
My Asthma Action Plan  Name: Alyssa Manzano   YOB: 1980  Date: 6/6/2017   My doctor: EMMA Langley CNP   My clinic: [facility]        My Control Medicine: none  My Rescue Medicine: Albuterol (Proair/Ventolin/Proventil) inhaler 2 puffs every 4 hours as needed   My Asthma Severity: intermittent  Avoid your asthma triggers: upper respiratory infections, humidity and cold air               GREEN ZONE     Good Control    I feel good    No cough or wheeze    Can work, sleep and play without asthma symptoms       Take your asthma control medicine every day.     1. If exercise triggers your asthma, take your rescue medication    15 minutes before exercise or sports, and    During exercise if you have asthma symptoms  2. Spacer to use with inhaler: If you have a spacer, make sure to use it with your inhaler             YELLOW ZONE     Getting Worse  I have ANY of these:    I do not feel good    Cough or wheeze    Chest feels tight    Wake up at night   1. Keep taking your Green Zone medications  2. Start taking your rescue medicine:    every 20 minutes for up to 1 hour. Then every 4 hours for 24-48 hours.  3. If you stay in the Yellow Zone for more than 12-24 hours, contact your doctor.  4. If you do not return to the Green Zone in 12-24 hours or you get worse, start taking your oral steroid medicine if prescribed by your provider.           RED ZONE     Medical Alert - Get Help  I have ANY of these:    I feel awful    Medicine is not helping    Breathing getting harder    Trouble walking or talking    Nose opens wide to breathe       1. Take your rescue medicine NOW  2. If your provider has prescribed an oral steroid medicine, start taking it NOW  3. Call your doctor NOW  4. If you are still in the Red Zone after 20 minutes and you have not reached your doctor:    Take your rescue medicine again and    Call 911 or go to the emergency room right away    See your regular doctor within 2  weeks of an Emergency Room or Urgent Care visit for follow-up treatment.        Electronically signed by: Tawana Handley, June 6, 2017    Annual Reminders:  Meet with Asthma Educator,  Flu Shot in the Fall, consider Pneumonia Vaccination for patients with asthma (aged 19 and older).    Pharmacy:    [id number] - [place], [place] - [address] AT [facility] & [place]  [id number] - [place], [place] - [address] [place] AT [facility]  [id number] - [place], [place] - [address]  [id number] - [place], [place] - [address] AT [facility] &  ( [facility])  [facility] - [place], [place] - [address], [address]  [id number] - [place], [place] - [address]                    Asthma Triggers  How To Control Things That Make Your Asthma Worse    Triggers are things that make your asthma worse.  Look at the list below to help you find your triggers and what you can do about them.  You can help prevent asthma flare-ups by staying away from your triggers.      Trigger                                                          What you can do   Cigarette Smoke  Tobacco smoke can make asthma worse. Do not allow smoking in your home, car or around you.  Be sure no one smokes at a child s day care or school.  If you smoke, ask your health care provider for ways to help you quit.  Ask family members to quit too.  Ask your health care provider for a referral to Quit Plan to help you quit smoking, or call 0-213-959-PLAN.     Colds, Flu, Bronchitis  These are common triggers of asthma. Wash your hands often.  Don t touch your eyes, nose or mouth.  Get a flu shot every year.     Dust Mites  These are tiny bugs that live in cloth or carpet. They are too small to see. Wash sheets and blankets in hot water every week.   Encase pillows and mattress in dust mite proof covers.  Avoid having carpet if you can. If you have  carpet, vacuum weekly.   Use a dust mask and HEPA vacuum.   Pollen and Outdoor Mold  Some people are allergic to trees, grass, or weed pollen, or molds. Try to keep your windows closed.  Limit time out doors when pollen count is high.   Ask you health care provider about taking medicine during allergy season.     Animal Dander  Some people are allergic to skin flakes, urine or saliva from pets with fur or feathers. Keep pets with fur or feathers out of your home.    If you can t keep the pet outdoors, then keep the pet out of your bedroom.  Keep the bedroom door closed.  Keep pets off cloth furniture and away from stuffed toys.     Mice, Rats, and Cockroaches  Some people are allergic to the waste from these pests.   Cover food and garbage.  Clean up spills and food crumbs.  Store grease in the refrigerator.   Keep food out of the bedroom.   Indoor Mold  This can be a trigger if your home has high moisture. Fix leaking faucets, pipes, or other sources of water.   Clean moldy surfaces.  Dehumidify basement if it is damp and smelly.   Smoke, Strong Odors, and Sprays  These can reduce air quality. Stay away from strong odors and sprays, such as perfume, powder, hair spray, paints, smoke incense, paint, cleaning products, candles and new carpet.   Exercise or Sports  Some people with asthma have this trigger. Be active!  Ask your doctor about taking medicine before sports or exercise to prevent symptoms.    Warm up for 5-10 minutes before and after sports or exercise.     Other Triggers of Asthma  Cold air:  Cover your nose and mouth with a scarf.  Sometimes laughing or crying can be a trigger.  Some medicines and food can trigger asthma.

## 2017-06-06 NOTE — Clinical Note
Alyssa Quintero knows she needs to come in and will plan for after her surgery. Are you okay with my instructions on her injections of the Neupogen for her pre-op/surgery? Thanks, Hope you are well! Tawana

## 2017-06-06 NOTE — NURSING NOTE
"Chief Complaint   Patient presents with     Pre-Op Exam     Panel Management     phq9/starr, weight       Initial /66  Pulse 88  Temp 99  F (37.2  C) (Temporal)  Resp 16  Ht 5' 7\" (1.702 m)  Wt 142 lb (64.4 kg)  LMP 05/16/2017 (Approximate)  BMI 22.24 kg/m2 Estimated body mass index is 22.24 kg/(m^2) as calculated from the following:    Height as of this encounter: 5' 7\" (1.702 m).    Weight as of this encounter: 142 lb (64.4 kg).  Medication Reconciliation: complete     Jovan Saucedo MA    "

## 2017-06-07 ASSESSMENT — ASTHMA QUESTIONNAIRES: ACT_TOTALSCORE: 23

## 2017-06-08 ENCOUNTER — TELEPHONE (OUTPATIENT)
Dept: FAMILY MEDICINE | Facility: CLINIC | Age: 37
End: 2017-06-08

## 2017-06-08 NOTE — TELEPHONE ENCOUNTER
[facility] calling. Pt is restricted to use [facility] per her insurance. They are going to transfer the recent prescriptions that were sent to them, they just wanted you to know for next time.   Thank you,  Vashti Doran- Pt Rep.

## 2017-06-13 ENCOUNTER — TRANSFERRED RECORDS (OUTPATIENT)
Dept: HEALTH INFORMATION MANAGEMENT | Facility: CLINIC | Age: 37
End: 2017-06-13

## 2017-06-15 ENCOUNTER — TELEPHONE (OUTPATIENT)
Dept: FAMILY MEDICINE | Facility: CLINIC | Age: 37
End: 2017-06-15

## 2017-06-15 DIAGNOSIS — D70.8 AUTOIMMUNE NEUTROPENIA (H): ICD-10-CM

## 2017-06-28 ENCOUNTER — TRANSFERRED RECORDS (OUTPATIENT)
Dept: HEALTH INFORMATION MANAGEMENT | Facility: CLINIC | Age: 37
End: 2017-06-28

## 2017-07-06 DIAGNOSIS — D70.8 AUTOIMMUNE NEUTROPENIA (H): ICD-10-CM

## 2017-07-06 NOTE — TELEPHONE ENCOUNTER
filgrastim (NEUPOGEN) 480 MCG/0.8ML SOSY syringe      Last Written Prescription Date:  6/15/2017  Last Fill Quantity: 8 syringes,   # refills: 0  Last Office Visit with [facility] or  Health prescribing provider: 6/6/2017  Future Office visit:       Routing refill request to provider for review/approval because:  Drug not on the [facility], [facility] or [facility] refill protocol or controlled substance

## 2017-07-07 NOTE — TELEPHONE ENCOUNTER
Left message asking patient to return call.    Please inform patient of message from Provider below.

## 2017-10-03 ENCOUNTER — TRANSFERRED RECORDS (OUTPATIENT)
Dept: HEALTH INFORMATION MANAGEMENT | Facility: CLINIC | Age: 37
End: 2017-10-03

## 2017-11-17 ENCOUNTER — ONCOLOGY VISIT (OUTPATIENT)
Dept: ONCOLOGY | Facility: CLINIC | Age: 37
End: 2017-11-17
Payer: COMMERCIAL

## 2017-11-17 VITALS
HEART RATE: 70 BPM | HEIGHT: 67 IN | TEMPERATURE: 98.7 F | WEIGHT: 139 LBS | OXYGEN SATURATION: 100 % | DIASTOLIC BLOOD PRESSURE: 70 MMHG | SYSTOLIC BLOOD PRESSURE: 103 MMHG | RESPIRATION RATE: 18 BRPM | BODY MASS INDEX: 21.82 KG/M2

## 2017-11-17 DIAGNOSIS — D70.8 OTHER NEUTROPENIA (H): ICD-10-CM

## 2017-11-17 DIAGNOSIS — K12.30 MUCOSITIS: Primary | ICD-10-CM

## 2017-11-17 DIAGNOSIS — R07.0 THROAT PAIN: ICD-10-CM

## 2017-11-17 LAB
DIFFERENTIAL METHOD BLD: ABNORMAL
EOSINOPHIL # BLD AUTO: 0 10E9/L (ref 0–0.7)
EOSINOPHIL NFR BLD AUTO: 4 %
ERYTHROCYTE [DISTWIDTH] IN BLOOD BY AUTOMATED COUNT: 13.6 % (ref 10–15)
HCT VFR BLD AUTO: 36.5 % (ref 35–47)
HGB BLD-MCNC: 12 G/DL (ref 11.7–15.7)
LYMPHOCYTES # BLD AUTO: 0.9 10E9/L (ref 0.8–5.3)
LYMPHOCYTES NFR BLD AUTO: 65 %
MCH RBC QN AUTO: 30.1 PG (ref 26.5–33)
MCHC RBC AUTO-ENTMCNC: 32.9 G/DL (ref 31.5–36.5)
MCV RBC AUTO: 92 FL (ref 78–100)
MONOCYTES # BLD AUTO: 0.3 10E9/L (ref 0–1.3)
MONOCYTES NFR BLD AUTO: 29 %
NEUTROPHILS # BLD AUTO: 0 10E9/L (ref 1.6–8.3)
NEUTROPHILS NFR BLD AUTO: 2 %
PLATELET # BLD AUTO: 172 10E9/L (ref 150–450)
PLATELET # BLD EST: NORMAL 10*3/UL
RBC # BLD AUTO: 3.99 10E12/L (ref 3.8–5.2)
RBC MORPH BLD: NORMAL
WBC # BLD AUTO: 1.2 10E9/L (ref 4–11)

## 2017-11-17 PROCEDURE — 99214 OFFICE O/P EST MOD 30 MIN: CPT | Mod: 25 | Performed by: INTERNAL MEDICINE

## 2017-11-17 PROCEDURE — 85025 COMPLETE CBC W/AUTO DIFF WBC: CPT | Performed by: INTERNAL MEDICINE

## 2017-11-17 PROCEDURE — 96372 THER/PROPH/DIAG INJ SC/IM: CPT

## 2017-11-17 PROCEDURE — 36415 COLL VENOUS BLD VENIPUNCTURE: CPT | Performed by: INTERNAL MEDICINE

## 2017-11-17 ASSESSMENT — PAIN SCALES - GENERAL: PAINLEVEL: NO PAIN (0)

## 2017-11-17 NOTE — MR AVS SNAPSHOT
After Visit Summary   11/17/2017    Alyssa Manzano    MRN: [id number]           Patient Information     Date Of Birth          1980        Visit Information        Provider Department      11/17/2017 4:30 PM Constance Nichole MD [facility]        Today's Diagnoses     Mucositis    -  1    Other neutropenia (H)           Follow-ups after your visit        Who to contact     If you have questions or need follow up information about today's clinic visit or your schedule please contact [facility] directly at [phone].  Normal or non-critical lab and imaging results will be communicated to you by [employer], letter or phone within 4 business days after the clinic has received the results. If you do not hear from us within 7 days, please contact the clinic through FilmTrackt or phone. If you have a critical or abnormal lab result, we will notify you by phone as soon as possible.  Submit refill requests through [employer] or call your pharmacy and they will forward the refill request to us. Please allow 3 business days for your refill to be completed.          Additional Information About Your Visit        MyChart Information     Act[employer] gives you secure access to your electronic health record. If you see a primary care provider, you can also send messages to your care team and make appointments. If you have questions, please call your primary care clinic.  If you do not have a primary care provider, please call [phone] and they will assist you.      Act[employer] is an electronic gateway that provides easy, online access to your medical records. With Act[employer], you can request a clinic appointment, read your test results, renew a prescription or communicate with your care team.     To access your existing account, please contact your [facility] or call [phone] for assistance.        Care EveryWhere ID     This is your Care EveryWhere  "ID. This could be used by other organizations to access your [place] medical records  [id number]        Your Vitals Were     Pulse Temperature Respirations Height Last Period Pulse Oximetry    70 98.7  F (37.1  C) (Oral) 18 1.702 m (5' 7.01\") 11/03/2017 100%    BMI (Body Mass Index)                   21.77 kg/m2            Blood Pressure from Last 3 Encounters:   11/17/17 103/70   06/06/17 108/66   05/04/17 103/67    Weight from Last 3 Encounters:   11/17/17 63 kg (139 lb)   06/06/17 64.4 kg (142 lb)   05/04/17 64.4 kg (142 lb)                 Today's Medication Changes          These changes are accurate as of: 11/17/17 11:59 PM.  If you have any questions, ask your nurse or doctor.               Start taking these medicines.        Dose/Directions    amoxicillin-clavulanate 875-125 MG per tablet   Commonly known as:  AUGMENTIN   Used for:  Other neutropenia (H), Mucositis   Started by:  Constance Nichole MD        Dose:  1 tablet   Take 1 tablet by mouth 2 times daily   Quantity:  14 tablet   Refills:  0         These medicines have changed or have updated prescriptions.        Dose/Directions    * filgrastim 480 MCG/0.8ML Sosy syringe   Commonly known as:  NEUPOGEN   This may have changed:  Another medication with the same name was added. Make sure you understand how and when to take each.   Used for:  Autoimmune neutropenia (H)   Changed by:  Tawana Handley APRN CNP        Inject 1 syringe or 0.8 ml  twice weekly   Quantity:  8 Syringe   Refills:  0       * Filgrastim 300 MCG/0.5ML Sosy syringe   Commonly known as:  NEUPOGEN   This may have changed:  You were already taking a medication with the same name, and this prescription was added. Make sure you understand how and when to take each.   Used for:  Other neutropenia (H)   Changed by:  Constance Nichole MD        Dose:  300 mcg   Start taking on:  11/20/2017   Inject 0.5 mLs (300 mcg) Subcutaneous twice a week   Quantity:  10 Syringe   Refills:  11       " * Notice:  This list has 2 medication(s) that are the same as other medications prescribed for you. Read the directions carefully, and ask your doctor or other care provider to review them with you.         Where to get your medicines      These medications were sent to [facility] [place], [place] - [address]  [address], [facility] [address]     Phone:  [phone]     Filgrastim 300 MCG/0.5ML Sosy syringe         These medications were sent to [id number] - [place], [place] - [address]  [address], [facility] [address]    Hours:  test Rx sent successfully 12/26/02  KR Phone:  [phone]     amoxicillin-clavulanate 875-125 MG per tablet                Primary Care Provider Office Phone # Fax #    Tawana EMMA [facility] [phone] [address] [facility] [address]        Equal Access to Services     [facility]: Hadii aad ku hadasho Soomaali, waaxda luqadaha, qaybta kaalmada adeegyada, waxay idiin hayaan adeeg kharash veronica . So [facility] [phone].    ATENCIÓN: Si habla español, tiene a cook disposición servicios gratuitos de asistencia lingüística. [facility] [phone].    We comply with applicable federal civil rights laws and [place] laws. We do not discriminate on the basis of race, color, national origin, age, disability, sex, sexual orientation, or gender identity.            Thank you!     Thank you for choosing [facility]  for your care. Our goal is always to provide you with excellent care. Hearing back from our patients is one way we can continue to improve our services. Please take a few minutes to complete the written survey that you may receive in the mail after your visit with us. Thank you!             Your Updated Medication List - Protect others around you: Learn how to safely use, store and throw away your medicines at www.disposemymeds.org.          This list is accurate  as of: 11/17/17 11:59 PM.  Always use your most recent med list.                   Brand Name Dispense Instructions for use Diagnosis    albuterol 108 (90 BASE) MCG/ACT Inhaler    PROAIR HFA/PROVENTIL HFA/VENTOLIN HFA    1 Inhaler    Inhale 2 puffs into the lungs every 4 hours as needed for shortness of breath / dyspnea or wheezing    Intermittent asthma, uncomplicated       amoxicillin-clavulanate 875-125 MG per tablet    AUGMENTIN    14 tablet    Take 1 tablet by mouth 2 times daily    Other neutropenia (H), Mucositis       * filgrastim 480 MCG/0.8ML Sosy syringe    NEUPOGEN    8 Syringe    Inject 1 syringe or 0.8 ml  twice weekly    Autoimmune neutropenia (H)       * Filgrastim 300 MCG/0.5ML Sosy syringe   Start taking on:  11/20/2017    NEUPOGEN    10 Syringe    Inject 0.5 mLs (300 mcg) Subcutaneous twice a week    Other neutropenia (H)       loratadine 10 MG tablet    CLARITIN     Take 10 mg by mouth daily Reported on 2/14/2017        * Notice:  This list has 2 medication(s) that are the same as other medications prescribed for you. Read the directions carefully, and ask your doctor or other care provider to review them with you.

## 2017-11-17 NOTE — PROGRESS NOTES
Hematology Follow-up Visit:  11/17/2017    Referring Provider: Tawana Handley NP  OB/GYN: Dr. Jackson    Diagnosis: Autoimmune Neutropenia  -    Hematologic History:   Alyssa is a 36 year old female with neutropenia dating back at least to March 2007 when her ANC measured 1.2. Hemoglobin and platelet count were normal. At that time, she saw Dr. Abraham Ybarra for a hematology consultation, had an unremarkable peripheral blood smear, and was felt to have benign neutropenia. No treatment recommendations were made at that time.   Since then, she was noted to have severe neutropenia on several occasions. Her hematologic work-up for neutropenia was essentially negative with complete metabolic panel normal. Vitamin B12, folic acid, TSH, Hep B panel, Hep C, and HIV antibody unremarkable. CAT negative. Immunoglobulins revealed low normal IgG level at 690 mg/dL (normal 695-1620 mg/dL). IgA and IgM were normal. RA factor slightly elevated at 20 IU/mL. Peripheral blood smear revealed slight normochromic normocytic anemia and marked leukopenia due to neutropenia. Spleen was mildly enlarged on an US and rheumatoid factor was positive and she was seen by Dr. Solis but he did not feel that she had Felty's syndrome or a clinically significant connective tissue disorder.     We decided to proceed with a bone marrow biopsy for further evaluation. BMBX on 6/8/2011 showed:    Marrow cellularity of 50-60% with trilineage hematopoietic maturation  Left-shifted but complete granulocytic maturation  No increase in blasts; no morphologic evidence of dysplasia    Flow Cytometry showed:  Polytypic B lymphocytes  No aberrant immunophenotype on T lymphocytes  No increase in blasts  Cytogenetics showed:  46,XX[20]    No clonal chromosomal abnormality was detected among the 20 metaphase cells analyzed. Thus, no cytogenetic evidence of a malignant process was found.    She had antigranulocyte antibodies detected in her blood.   She has two  "daughters, the youngest is 3 years old and with both of her pregnancies her neutropenia has responded to PO prednisone 20 mg orally daily in the last month before delivery. She has three children at home.    Interval History:  Alyssa is a 36 year old female diagnosed with neutropenia present today for follow-up.   She was recommended to stay on Neupogen 480 mcg twice a week prophylactically but has not done so. Some injections were painful for her in the past and she also experienced bone/muscle stiffness after the injections. She has had mouth sores constantly for the past 6 months and now has a large one on the side of her tongue. She has been rinsing her mouth with salt water.   She believes because of her mouth sores her sinuses are also stuffed up and she has three kids at home who get sick from time to time. She has had no fevers. She has no cough, no greenish nasal d/c.  She is not planning any more pregnancies. She had a tubal ligation. In addition, a complete 12 point  review of systems is negative.      Past medical, social, surgical, and family histories reviewed.    She has no family history of hematologic disorders or autoimmune disease.    Allergies:  Allergies as of 01/24/2013 - reviewed 01/24/2013    Allergen  Reaction  Noted       No known drug allergies    04/25/2011          Current Medications:  Current Outpatient Prescriptions   Medication     filgrastim (NEUPOGEN) 480 MCG/0.8ML SOSY syringe     albuterol (PROAIR HFA/PROVENTIL HFA/VENTOLIN HFA) 108 (90 BASE) MCG/ACT Inhaler     loratadine (CLARITIN) 10 MG tablet     No current facility-administered medications for this visit.      Facility-Administered Medications Ordered in Other Visits   Medication     ketorolac (TORADOL) injection         Physical Exam:    /70  Pulse 70  Temp 98.7  F (37.1  C) (Oral)  Resp 18  Ht 1.702 m (5' 7.01\")  Wt 63 kg (139 lb)  LMP 11/03/2017  SpO2 100%  BMI 21.77 kg/m2      GENERAL APPEARANCE: Healthy, " alert and in no acute distress.  HEENT: Sclerae anicteric. OP: + 2 aphthous sores, about 1 cm on the right side of the tongue and small 5 mm on the left side of the tongue.  Hear: S1S2 reg  Lungs: CTA b/l  MUSCULOSKELETAL: Extremities without gross deformities noted. No edema b/l LE  Skin: no lesions  NEURO: Alert and oriented x 3.  PSYCHIATRIC: Mentation and affect appear normal.    Laboratory Studies:  Lab Results   Component Value Date    WBC 1.2 11/17/2017     Lab Results   Component Value Date    RBC 3.99 11/17/2017     Lab Results   Component Value Date    HGB 12.0 11/17/2017     Lab Results   Component Value Date    HCT 36.5 11/17/2017     No components found for: MCT  Lab Results   Component Value Date    MCV 92 11/17/2017     Lab Results   Component Value Date    MCH 30.1 11/17/2017     Lab Results   Component Value Date    MCHC 32.9 11/17/2017     Lab Results   Component Value Date    RDW 13.6 11/17/2017     Lab Results   Component Value Date     11/17/2017         ASSESSMENT/PLAN:  Alyssa is a radha 36 year old woman with 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  1. Neutropenia, autoimmune, with presence of antigranulocyte antibodies. Her ANC is 0 but she is not taking any Neupogen. She has mouth sores and possible sinusitis. We'll treat with Augment 875 mg PO x 7days and also give Neupogen- will give lower dose 300 mcg here in clinic to see if she tolerates it better than 300 mcg dose. We talked about importance os staying on Neupogen regularly and I asked her to try a lower dose of 300 mcg of Neupogen  twice a week, and gave her Rx for it and asked her to come back in 4 weeks with cbcd.  We'll inform her of the results and f/up plan after she starts on newer dose of Neupogen and has her CBCd rechecked as above.  Her ANC has responded to Neupogen previously. I would also expect her mouth sores to heal as her ANC improves.  Neutropenic precautions reviewed. She was instructed to call us with  non-resolving or worsening symptoms. She was instructed to seek immediate medical care with any signs or symptoms of infection, or fever.  At the end of the visit the patient verbalized understanding, denied any further questions, and concurred with the plan of care.     ANC 7.2 on 12/21. Cont Neupogen 300 mcg twice a week. F/up in one month with cbcd (trough ANC at that time). If ANC is normal and no infectious/mouth sores, consider going down to once a week on Neupogen.

## 2017-11-17 NOTE — NURSING NOTE
"Oncology Rooming Note    November 17, 2017 4:29 PM   Alyssa Manzano is a 36 year old female who presents for:    Chief Complaint   Patient presents with     Oncology Clinic Visit     1 yr f/u-flu shot     Initial Vitals: There were no vitals taken for this visit. Estimated body mass index is 22.24 kg/(m^2) as calculated from the following:    Height as of 6/6/17: 1.702 m (5' 7\").    Weight as of 6/6/17: 64.4 kg (142 lb). There is no height or weight on file to calculate BSA.  Data Unavailable Comment: Data Unavailable   No LMP recorded.  Allergies reviewed: Yes  Medications reviewed: Yes    Medications: Medication refills not needed today.  Pharmacy name entered into [facility]:    [facility] [place] - [place], [place] - [address], [address]  [address]    Clinical concerns:     8 minutes for nursing intake (face to face time)     CRISTOBAL JEAN LPN              "

## 2017-11-17 NOTE — LETTER
11/17/2017         RE: Alyss[address] Manzano  [address] [facility] [address]        Dear Colleague,    Thank you for referring your patient, Alyssa Manzano, to the [facility]. Please see a copy of my visit note below.    Hematology Follow-up Visit:  11/17/2017    Referring Provider: Tawana Handley NP  OB/GYN: Dr. Jackson    Diagnosis: Autoimmune Neutropenia  -    Hematologic History:   Alyssa is a 36 year old female with neutropenia dating back at least to March 2007 when her ANC measured 1.2. Hemoglobin and platelet count were normal. At that time, she saw Dr. Abraham Ybarra for a hematology consultation, had an unremarkable peripheral blood smear, and was felt to have benign neutropenia. No treatment recommendations were made at that time.   Since then, she was noted to have severe neutropenia on several occasions. Her hematologic work-up for neutropenia was essentially negative with complete metabolic panel normal. Vitamin B12, folic acid, TSH, Hep B panel, Hep C, and HIV antibody unremarkable. CAT negative. Immunoglobulins revealed low normal IgG level at 690 mg/dL (normal 695-1620 mg/dL). IgA and IgM were normal. RA factor slightly elevated at 20 IU/mL. Peripheral blood smear revealed slight normochromic normocytic anemia and marked leukopenia due to neutropenia. Spleen was mildly enlarged on an US and rheumatoid factor was positive and she was seen by Dr. Solis but he did not feel that she had Felty's syndrome or a clinically significant connective tissue disorder.     We decided to proceed with a bone marrow biopsy for further evaluation. BMBX on 6/8/2011 showed:    Marrow cellularity of 50-60% with trilineage hematopoietic maturation  Left-shifted but complete granulocytic maturation  No increase in blasts; no morphologic evidence of dysplasia    Flow Cytometry showed:  Polytypic B lymphocytes  No aberrant immunophenotype on T lymphocytes  No increase in blasts  Cytogenetics  showed:  46,XX[20]    No clonal chromosomal abnormality was detected among the 20 metaphase cells analyzed. Thus, no cytogenetic evidence of a malignant process was found.    She had antigranulocyte antibodies detected in her blood.   She has two daughters, the youngest is 3 years old and with both of her pregnancies her neutropenia has responded to PO prednisone 20 mg orally daily in the last month before delivery. She has three children at home.    Interval History:  Alyssa is a 36 year old female diagnosed with neutropenia present today for follow-up.   She was recommended to stay on Neupogen 480 mcg twice a week prophylactically but has not done so. Some injections were painful for her in the past and she also experienced bone/muscle stiffness after the injections. She has had mouth sores constantly for the past 6 months and now has a large one on the side of her tongue. She has been rinsing her mouth with salt water.   She believes because of her mouth sores her sinuses are also stuffed up and she has three kids at home who get sick from time to time. She has had no fevers. She has no cough, no greenish nasal d/c.  She is not planning any more pregnancies. She had a tubal ligation. In addition, a complete 12 point  review of systems is negative.      Past medical, social, surgical, and family histories reviewed.    She has no family history of hematologic disorders or autoimmune disease.    Allergies:  Allergies as of 01/24/2013 - reviewed 01/24/2013    Allergen  Reaction  Noted       No known drug allergies    04/25/2011          Current Medications:  Current Outpatient Prescriptions   Medication     filgrastim (NEUPOGEN) 480 MCG/0.8ML SOSY syringe     albuterol (PROAIR HFA/PROVENTIL HFA/VENTOLIN HFA) 108 (90 BASE) MCG/ACT Inhaler     loratadine (CLARITIN) 10 MG tablet     No current facility-administered medications for this visit.      Facility-Administered Medications Ordered in Other Visits   Medication  "    ketorolac (TORADOL) injection         Physical Exam:    /70  Pulse 70  Temp 98.7  F (37.1  C) (Oral)  Resp 18  Ht 1.702 m (5' 7.01\")  Wt 63 kg (139 lb)  LMP 11/03/2017  SpO2 100%  BMI 21.77 kg/m2      GENERAL APPEARANCE: Healthy, alert and in no acute distress.  HEENT: Sclerae anicteric. OP: + 2 aphthous sores, about 1 cm on the right side of the tongue and small 5 mm on the left side of the tongue.  Hear: S1S2 reg  Lungs: CTA b/l  MUSCULOSKELETAL: Extremities without gross deformities noted. No edema b/l LE  Skin: no lesions  NEURO: Alert and oriented x 3.  PSYCHIATRIC: Mentation and affect appear normal.    Laboratory Studies:  Lab Results   Component Value Date    WBC 1.2 11/17/2017     Lab Results   Component Value Date    RBC 3.99 11/17/2017     Lab Results   Component Value Date    HGB 12.0 11/17/2017     Lab Results   Component Value Date    HCT 36.5 11/17/2017     No components found for: MCT  Lab Results   Component Value Date    MCV 92 11/17/2017     Lab Results   Component Value Date    MCH 30.1 11/17/2017     Lab Results   Component Value Date    MCHC 32.9 11/17/2017     Lab Results   Component Value Date    RDW 13.6 11/17/2017     Lab Results   Component Value Date     11/17/2017         ASSESSMENT/PLAN:  Alyssa is a radha 36 year old woman with 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  1. Neutropenia, autoimmune, with presence of antigranulocyte antibodies. Her ANC is 0 but she is not taking any Neupogen. She has mouth sores and possible sinusitis. We'll treat with Augment 875 mg PO x 7days and also give Neupogen- will give lower dose 300 mcg here in clinic to see if she tolerates it better than 300 mcg dose. We talked about importance os staying on Neupogen regularly and I asked her to try a lower dose of 300 mcg of Neupogen  twice a week, and gave her Rx for it and asked her to come back in 4 weeks with cbcd.  We'll inform her of the results and f/up plan after she " starts on newer dose of Neupogen and has her CBCd rechecked as above.  Her ANC has responded to Neupogen previously. I would also expect her mouth sores to heal as her ANC improves.  Neutropenic precautions reviewed. She was instructed to call us with non-resolving or worsening symptoms. She was instructed to seek immediate medical care with any signs or symptoms of infection, or fever.  At the end of the visit the patient verbalized understanding, denied any further questions, and concurred with the plan of care.       Again, thank you for allowing me to participate in the care of your patient.        Sincerely,        Constance Nichole MD, MD

## 2017-11-28 ENCOUNTER — TELEPHONE (OUTPATIENT)
Dept: ONCOLOGY | Facility: CLINIC | Age: 37
End: 2017-11-28

## 2017-11-28 NOTE — TELEPHONE ENCOUNTER
Received call from patient stating she has not received her prescription for Neupogen yet. Sent to mail order pharmacy 11/17/17. Writer contacted pharmacy and was instructed medication ready for delivery but they were waiting for a call from patient with a delivery date. Patient states she forgot to change her phone number with the pharmacy and she will call them immediately. She still had neupogen at home and had started taking twice/week after her visit with Dr Nichole.  Flower Hilton  RN, BSN, OCN

## 2017-12-03 ENCOUNTER — TRANSFERRED RECORDS (OUTPATIENT)
Dept: HEALTH INFORMATION MANAGEMENT | Facility: CLINIC | Age: 37
End: 2017-12-03

## 2017-12-03 ENCOUNTER — NURSE TRIAGE (OUTPATIENT)
Dept: NURSING | Facility: CLINIC | Age: 37
End: 2017-12-03

## 2017-12-03 NOTE — TELEPHONE ENCOUNTER
"C/o severe \"excruciating\" (10/10) \"shooting\" intermittent pain approx 3 inches above navel in middle of abdomen. Present for 1 hour. Pains occurring every few min  last about 1 min. C/o nausea, no vomiting. Denies pregnancy. LMP \"3 1/2 weeks ago\". No diarrhea. Denies constipation; reports normal BM today. Advised ED now per guideline. Pt voiced understanding. States will have  take her to ED now. Elizabeth Joya RN/FNA    Reason for Disposition    [1] SEVERE pain (e.g., excruciating) AND [2] present > 1 hour    Additional Information    Negative: Shock suspected (e.g., cold/pale/clammy skin, too weak to stand, low BP, rapid pulse)    Negative: Difficult to awaken or acting confused  (e.g., disoriented, slurred speech)    Negative: Passed out (i.e., lost consciousness, collapsed and was not responding)    Negative: Sounds like a life-threatening emergency to the triager    Negative: Chest pain    Negative: Pain is mainly in upper abdomen  (if needed ask: \"is it mainly above the belly button?\")    Negative: Followed an abdomen (stomach) injury    Negative: [1] Abdominal pain AND [2] pregnant < 20 weeks    Negative: [1] Abdominal pain AND [2] pregnant > 20 weeks    Negative: [1] Abdominal pain AND [2] postpartum < 1 month since delivery    Protocols used: ABDOMINAL PAIN - FEMALE-ADULT-    "

## 2017-12-21 DIAGNOSIS — D70.8 OTHER NEUTROPENIA (H): ICD-10-CM

## 2017-12-21 LAB
BASOPHILS # BLD AUTO: 0 10E9/L (ref 0–0.2)
BASOPHILS NFR BLD AUTO: 0.1 %
DIFFERENTIAL METHOD BLD: ABNORMAL
EOSINOPHIL # BLD AUTO: 0.1 10E9/L (ref 0–0.7)
EOSINOPHIL NFR BLD AUTO: 0.8 %
ERYTHROCYTE [DISTWIDTH] IN BLOOD BY AUTOMATED COUNT: 13.2 % (ref 10–15)
HCT VFR BLD AUTO: 34.9 % (ref 35–47)
HGB BLD-MCNC: 11.6 G/DL (ref 11.7–15.7)
LYMPHOCYTES # BLD AUTO: 1 10E9/L (ref 0.8–5.3)
LYMPHOCYTES NFR BLD AUTO: 10.4 %
MCH RBC QN AUTO: 31.3 PG (ref 26.5–33)
MCHC RBC AUTO-ENTMCNC: 33.2 G/DL (ref 31.5–36.5)
MCV RBC AUTO: 94 FL (ref 78–100)
MONOCYTES # BLD AUTO: 0.9 10E9/L (ref 0–1.3)
MONOCYTES NFR BLD AUTO: 10 %
NEUTROPHILS # BLD AUTO: 7.2 10E9/L (ref 1.6–8.3)
NEUTROPHILS NFR BLD AUTO: 78.7 %
PLATELET # BLD AUTO: 203 10E9/L (ref 150–450)
RBC # BLD AUTO: 3.71 10E12/L (ref 3.8–5.2)
WBC # BLD AUTO: 9.2 10E9/L (ref 4–11)

## 2017-12-21 PROCEDURE — 36415 COLL VENOUS BLD VENIPUNCTURE: CPT | Performed by: INTERNAL MEDICINE

## 2017-12-21 PROCEDURE — 85025 COMPLETE CBC W/AUTO DIFF WBC: CPT | Performed by: INTERNAL MEDICINE

## 2017-12-22 ENCOUNTER — TELEPHONE (OUTPATIENT)
Dept: ONCOLOGY | Facility: CLINIC | Age: 37
End: 2017-12-22

## 2017-12-22 NOTE — TELEPHONE ENCOUNTER
"Notified patient via telephone to provide results and recommendation from Dr Nichole. \"white count is totally normal and ANC is 7.2 on Neupogen!!! This is great news. She is borderline anemic. I would like her to remain on neupogen twice a week for another month and see me back in a month with cbcd- I would like CBCd to be right before she is due for her neupogen injection so that I know what her lowest count is. If neutrophil count is good we'll consider going down to once a week on Neupogen.\"  Discussed foods high in iron for her anemia. Patient expressed understanding and will call to set up appointments for labs and f/u with Dr BRODIE Hilton  RN, BSN, OCN      "

## 2018-01-29 ENCOUNTER — OFFICE VISIT (OUTPATIENT)
Dept: FAMILY MEDICINE | Facility: CLINIC | Age: 38
End: 2018-01-29
Payer: COMMERCIAL

## 2018-01-29 VITALS
BODY MASS INDEX: 21.87 KG/M2 | HEART RATE: 74 BPM | OXYGEN SATURATION: 100 % | TEMPERATURE: 97.7 F | HEIGHT: 67 IN | SYSTOLIC BLOOD PRESSURE: 100 MMHG | WEIGHT: 139.3 LBS | RESPIRATION RATE: 18 BRPM | DIASTOLIC BLOOD PRESSURE: 62 MMHG

## 2018-01-29 DIAGNOSIS — N92.6 LATE MENSES: ICD-10-CM

## 2018-01-29 DIAGNOSIS — R30.0 DYSURIA: ICD-10-CM

## 2018-01-29 DIAGNOSIS — B35.1 ONYCHOMYCOSIS: Primary | ICD-10-CM

## 2018-01-29 LAB
ALBUMIN UR-MCNC: NEGATIVE MG/DL
APPEARANCE UR: CLEAR
BACTERIA #/AREA URNS HPF: ABNORMAL /HPF
BETA HCG QUAL IFA URINE: NEGATIVE
BILIRUB UR QL STRIP: NEGATIVE
COLOR UR AUTO: YELLOW
GLUCOSE UR STRIP-MCNC: NEGATIVE MG/DL
HGB UR QL STRIP: ABNORMAL
KETONES UR STRIP-MCNC: NEGATIVE MG/DL
LEUKOCYTE ESTERASE UR QL STRIP: NEGATIVE
NITRATE UR QL: NEGATIVE
NON-SQ EPI CELLS #/AREA URNS LPF: ABNORMAL /LPF
PH UR STRIP: 5.5 PH (ref 5–7)
RBC #/AREA URNS AUTO: ABNORMAL /HPF
SOURCE: ABNORMAL
SP GR UR STRIP: 1.02 (ref 1–1.03)
UROBILINOGEN UR STRIP-ACNC: 0.2 EU/DL (ref 0.2–1)
WBC #/AREA URNS AUTO: ABNORMAL /HPF

## 2018-01-29 PROCEDURE — 99214 OFFICE O/P EST MOD 30 MIN: CPT | Performed by: PHYSICIAN ASSISTANT

## 2018-01-29 PROCEDURE — 84703 CHORIONIC GONADOTROPIN ASSAY: CPT | Performed by: PHYSICIAN ASSISTANT

## 2018-01-29 PROCEDURE — 81001 URINALYSIS AUTO W/SCOPE: CPT | Performed by: PHYSICIAN ASSISTANT

## 2018-01-29 ASSESSMENT — ANXIETY QUESTIONNAIRES
3. WORRYING TOO MUCH ABOUT DIFFERENT THINGS: NOT AT ALL
7. FEELING AFRAID AS IF SOMETHING AWFUL MIGHT HAPPEN: NOT AT ALL
4. TROUBLE RELAXING: NOT AT ALL
2. NOT BEING ABLE TO STOP OR CONTROL WORRYING: NOT AT ALL
6. BECOMING EASILY ANNOYED OR IRRITABLE: NOT AT ALL
GAD7 TOTAL SCORE: 0
7. FEELING AFRAID AS IF SOMETHING AWFUL MIGHT HAPPEN: NOT AT ALL
1. FEELING NERVOUS, ANXIOUS, OR ON EDGE: NOT AT ALL
GAD7 TOTAL SCORE: 0
5. BEING SO RESTLESS THAT IT IS HARD TO SIT STILL: NOT AT ALL
GAD7 TOTAL SCORE: 0

## 2018-01-29 ASSESSMENT — PATIENT HEALTH QUESTIONNAIRE - PHQ9
SUM OF ALL RESPONSES TO PHQ QUESTIONS 1-9: 2
10. IF YOU CHECKED OFF ANY PROBLEMS, HOW DIFFICULT HAVE THESE PROBLEMS MADE IT FOR YOU TO DO YOUR WORK, TAKE CARE OF THINGS AT HOME, OR GET ALONG WITH OTHER PEOPLE: NOT DIFFICULT AT ALL
SUM OF ALL RESPONSES TO PHQ QUESTIONS 1-9: 2

## 2018-01-29 ASSESSMENT — PAIN SCALES - GENERAL: PAINLEVEL: NO PAIN (0)

## 2018-01-29 NOTE — MR AVS SNAPSHOT
"              After Visit Summary   1/29/2018    Alyssa Manzano    MRN: [id number]           Patient Information     Date Of Birth          1980        Visit Information        Provider Department      1/29/2018 4:20 PM Zenia Ramirez PA-C [facility]        Today's Diagnoses     Onychomycosis    -  1    Dysuria        Late menses           Follow-ups after your visit        Who to contact     If you have questions or need follow up information about today's clinic visit or your schedule please contact [facility] directly at [phone].  Normal or non-critical lab and imaging results will be communicated to you by Stratoscalehart, letter or phone within 4 business days after the clinic has received the results. If you do not hear from us within 7 days, please contact the clinic through Stratoscalehart or phone. If you have a critical or abnormal lab result, we will notify you by phone as soon as possible.  Submit refill requests through Agillic or call your pharmacy and they will forward the refill request to us. Please allow 3 business days for your refill to be completed.          Additional Information About Your Visit        MyChart Information     [employer] gives you secure access to your electronic health record. If you see a primary care provider, you can also send messages to your care team and make appointments. If you have questions, please call your primary care clinic.  If you do not have a primary care provider, please call [phone] and they will assist you.        Care EveryWhere ID     This is your Care EveryWhere ID. This could be used by other organizations to access your [place] medical records  [id number]        Your Vitals Were     Pulse Temperature Respirations Height Last Period Pulse Oximetry    74 97.7  F (36.5  C) (Temporal) 18 5' 6.5\" (1.689 m) 12/26/2017 100%    BMI (Body Mass Index)                   22.15 kg/m2            Blood Pressure from Last 3 " Encounters:   01/29/18 100/62   11/17/17 103/70   06/06/17 108/66    Weight from Last 3 Encounters:   01/29/18 139 lb 4.8 oz (63.2 kg)   11/17/17 139 lb (63 kg)   06/06/17 142 lb (64.4 kg)              We Performed the Following     Beta HCG qual IFA urine - [facility] and [facility]     UA with Microscopic          Today's Medication Changes          These changes are accurate as of 1/29/18  5:15 PM.  If you have any questions, ask your nurse or doctor.               Start taking these medicines.        Dose/Directions    Ciclopirox 8 % Kit   Used for:  Onychomycosis   Started by:  Zenia Ramirez PA-C        Dose:  1 Application   Externally apply 1 Application topically daily Apply to adjacent skin and affected nails daily. Remove with alcohol every 7 days.   Quantity:  34.6 mL   Refills:  1         Stop taking these medicines if you haven't already. Please contact your care team if you have questions.     amoxicillin-clavulanate 875-125 MG per tablet   Commonly known as:  AUGMENTIN   Stopped by:  Zenia Ramirez PA-C                Where to get your medicines      These medications were sent to [id number] - [place], [place] - [address]  [address] [address]    Hours:  test Rx sent successfully 12/26/02   Phone:  [phone]     Ciclopirox 8 % Kit                Primary Care Provider Office Phone # Fax #    [facility] [phone] [address] [address]        Equal Access to Services     [facility]: Hadii aad ku hadasho Soomaali, waaxda luqadaha, qaybta kaalmada adeegyanash, vinh martin . So [facility] [place] [facility] [phone].    ATENCIÓN: Si habla español, tiene a cook disposición servicios gratuitos de asistencia lingüística. Llame al [phone].    We comply with applicable federal civil rights laws and [place] laws. We do not discriminate on the basis of race, color, national origin, age, disability, sex,  sexual orientation, or gender identity.            Thank you!     Thank you for choosing [facility]  for your care. Our goal is always to provide you with excellent care. Hearing back from our patients is one way we can continue to improve our services. Please take a few minutes to complete the written survey that you may receive in the mail after your visit with us. Thank you!             Your Updated Medication List - Protect others around you: Learn how to safely use, store and throw away your medicines at www.disposemymeds.org.          This list is accurate as of 1/29/18  5:15 PM.  Always use your most recent med list.                   Brand Name Dispense Instructions for use Diagnosis    albuterol 108 (90 BASE) MCG/ACT Inhaler    PROAIR HFA/PROVENTIL HFA/VENTOLIN HFA    1 Inhaler    Inhale 2 puffs into the lungs every 4 hours as needed for shortness of breath / dyspnea or wheezing    Intermittent asthma, uncomplicated       Ciclopirox 8 % Kit     34.6 mL    Externally apply 1 Application topically daily Apply to adjacent skin and affected nails daily. Remove with alcohol every 7 days.    Onychomycosis       * filgrastim 480 MCG/0.8ML Sosy syringe    NEUPOGEN    8 Syringe    Inject 1 syringe or 0.8 ml  twice weekly    Autoimmune neutropenia (H)       * Filgrastim 300 MCG/0.5ML Sosy syringe    NEUPOGEN    10 Syringe    Inject 0.5 mLs (300 mcg) Subcutaneous twice a week    Other neutropenia (H)       loratadine 10 MG tablet    CLARITIN     Take 10 mg by mouth daily Reported on 2/14/2017        * Notice:  This list has 2 medication(s) that are the same as other medications prescribed for you. Read the directions carefully, and ask your doctor or other care provider to review them with you.

## 2018-01-29 NOTE — PROGRESS NOTES
"  SUBJECTIVE:   Alyssa Manzano is a 37 year old female who presents to clinic today for the following health issues:      History of Present Illness     Diet:  Regular (no restrictions)  Frequency of exercise:  4-5 days/week  Duration of exercise:  45-60 minutes  Taking medications regularly:  Yes  Medication side effects:  Muscle aches  Additional concerns today:  No    Concern -  LEFT foot , big toe and middle toe   Onset: ongoing for a year   Has been treating with OTC topical she paints on for 3 months. It is not helping.   No skin infections.   Certain shoes are \"uncomfortable\" from it- sensitive feeling.   Started with great toe, then moved to middle toe nail.     Description: Possible foot fungus     Intensity: mild    Progression of Symptoms:  same    Accompanying Signs & Symptoms:  Yellowness,  Hardness , and middle toe pain     Previous history of similar problem:   None     Precipitating factors:   Worsened by: none     Alleviating factors:  Improved by:  None     Therapies Tried and outcome: Patient has been using a generic fungus infection liquid  for her toes. No improvement.     Dysuria  Was seen at [facility] clinic- Friday (3 d ago).   Told had white and red cells in urine. Treated with \"I don't know\".   Culture came back negative.   Has had 3 days of abx.   Her symptoms have gotten better since treating.   But was called with negative culture and told \"could be a tumor or kidney infection and should be seen again\".   Patient's last menstrual period was 12/26/2017. I'm late-- 3-4 days. Never late. Wants to be tested to be sure.    Problem list and histories reviewed & adjusted, as indicated.  Additional history: as documented      Patient Active Problem List   Diagnosis     Chronic rhinitis     Abnormal Pap smear, low grade squamous intraepithelial lesion (LGSIL)     CARDIOVASCULAR SCREENING; LDL GOAL LESS THAN 160     Urticaria     Autoimmune neutropenia (H)     Generalized anxiety disorder     " Excessive or frequent menstruation     Intermittent asthma, uncomplicated     Umbilical hernia without obstruction and without gangrene     Bilateral inguinal hernia without obstruction or gangrene, recurrence not specified     Diastasis recti     Past Surgical History:   Procedure Laterality Date     ABDOMEN SURGERY       BIOPSY       C/SECTION, LOW TRANSVERSE  ,      LAPAROSCOPIC HERNIORRHAPHY UMBILICAL N/A 3/31/2015    Procedure: LAPAROSCOPIC HERNIORRHAPHY UMBILICAL;  Surgeon: Bigg Mirza MD;  Location: [facility] OR     vaginal dellivery         Social History   Substance Use Topics     Smoking status: Former Smoker     Years: 1.00     Types: Cigarettes     Smokeless tobacco: Never Used     Alcohol use Yes      Comment: 4 drinks per week     Family History   Problem Relation Age of Onset     CANCER Maternal Grandfather      lung, smoker     HEART DISEASE Maternal Grandfather      CAD age 70's  age 81     C.A.D. Maternal Grandfather      81     CEREBROVASCULAR DISEASE Maternal Grandfather      DIABETES Maternal Grandfather      Other Cancer Maternal Grandfather      CEREBROVASCULAR DISEASE Paternal Grandfather      Multiple CVA     Breast Cancer Paternal Grandmother      CANCER Paternal Grandmother      DIABETES Father      Asthma Father      Coronary Artery Disease Other      Depression Sister      Anxiety Disorder Sister      Coronary Artery Disease Paternal Uncle      Heart Failure Paternal Uncle      Coronary Artery Disease Paternal Uncle      Heart Failure Paternal Uncle      Hypertension No family hx of      Thyroid Disease No family hx of      Alcohol/Drug No family hx of      Cancer - colorectal No family hx of      Prostate Cancer No family hx of      Allergies No family hx of      Arthritis No family hx of      Alzheimer Disease No family hx of      Anesthesia Reaction No family hx of      Ovarian Cancer No family hx of      Mental Illness No family hx of      OSTEOPOROSIS No family  "hx of      Known Genetic Syndrome No family hx of      Obesity No family hx of      Unknown/Adopted No family hx of      MENTAL ILLNESS No family hx of      Substance Abuse No family hx of      Genetic Disorder No family hx of      Hyperlipidemia No family hx of      Colon Cancer No family hx of          Current Outpatient Prescriptions   Medication Sig Dispense Refill     Ciclopirox 8 % KIT Externally apply 1 Application topically daily Apply to adjacent skin and affected nails daily. Remove with alcohol every 7 days. 34.6 mL 1     Filgrastim (NEUPOGEN) 300 MCG/0.5ML SOSY syringe Inject 0.5 mLs (300 mcg) Subcutaneous twice a week 10 Syringe 11     filgrastim (NEUPOGEN) 480 MCG/0.8ML SOSY syringe Inject 1 syringe or 0.8 ml  twice weekly 8 Syringe 0     albuterol (PROAIR HFA/PROVENTIL HFA/VENTOLIN HFA) 108 (90 BASE) MCG/ACT Inhaler Inhale 2 puffs into the lungs every 4 hours as needed for shortness of breath / dyspnea or wheezing 1 Inhaler 1     loratadine (CLARITIN) 10 MG tablet Take 10 mg by mouth daily Reported on 2/14/2017       Allergies   Allergen Reactions     No Known Drug Allergies      BP Readings from Last 3 Encounters:   01/29/18 100/62   11/17/17 103/70   06/06/17 108/66    Wt Readings from Last 3 Encounters:   01/29/18 139 lb 4.8 oz (63.2 kg)   11/17/17 139 lb (63 kg)   06/06/17 142 lb (64.4 kg)                  Labs reviewed in EPIC    ROS:  Constitutional, HEENT, cardiovascular, pulmonary, gi and gu systems are negative, except as otherwise noted.    OBJECTIVE:     /62  Pulse 74  Temp 97.7  F (36.5  C) (Temporal)  Resp 18  Ht 5' 6.5\" (1.689 m)  Wt 139 lb 4.8 oz (63.2 kg)  LMP 12/26/2017  SpO2 100%  BMI 22.15 kg/m2  Body mass index is 22.15 kg/(m^2).  GENERAL: healthy, alert and no distress  ABDOMEN: soft, nontender, no hepatosplenomegaly, no masses and bowel sounds normal  MS: no gross musculoskeletal defects noted, no edema  SKIN: RIGHT foot: great toe lateral edge yellow thickened " nail, no surrounding skin inflammation, erythema or breakdown. 3rd toe nail thickened, yellow, flaking end, no surrounding skin erythema, paronychia. No suspicious lesions or rashes  NEURO: Normal strength and tone, mentation intact and speech normal  BACK: no CVA tenderness, no paralumbar tenderness  PSYCH: mentation appears normal, affect normal/bright    Diagnostic Test Results:  Results for orders placed or performed in visit on 01/29/18 (from the past 24 hour(s))   UA with Microscopic   Result Value Ref Range    Color Urine Yellow     Appearance Urine Clear     Glucose Urine Negative NEG^Negative mg/dL    Bilirubin Urine Negative NEG^Negative    Ketones Urine Negative NEG^Negative mg/dL    Specific Gravity Urine 1.025 1.003 - 1.035    pH Urine 5.5 5.0 - 7.0 pH    Protein Albumin Urine Negative NEG^Negative mg/dL    Urobilinogen Urine 0.2 0.2 - 1.0 EU/dL    Nitrite Urine Negative NEG^Negative    Blood Urine Small (A) NEG^Negative    Leukocyte Esterase Urine Negative NEG^Negative    Source Midstream Urine     WBC Urine O - 2 OTO2^O - 2 /HPF    RBC Urine O - 2 OTO2^O - 2 /HPF    Squamous Epithelial /LPF Urine Few FEW^Few /LPF    Bacteria Urine Few (A) NEG^Negative /HPF   Beta HCG qual IFA urine - [facility] [place]   Result Value Ref Range    Beta HCG Qual IFA Urine Negative NEG^Negative          ASSESSMENT/PLAN:     1. Onychomycosis  Unsure active ingredient in paint-on product she has been using. Compare to rx below.   Discussed lamisil oral treatment - risks vs benefits and hepatic risks.   Reviewed Up To Date adverse events- noted rare but possible pancytopenias- with pts underlying neutropenia would advise getting ok from w/.     - Ciclopirox 8 % KIT; Externally apply 1 Application topically daily Apply to adjacent skin and affected nails daily. Remove with alcohol every 7 days.  Dispense: 34.6 mL; Refill: 1    2. Dysuria  Finish last 1.5 days on macrobid since having improvement.   Reassurance  micro does not show microscopic hematuria.   - UA with Microscopic    3. Late menses  Negative testing. Discussed factors that can influence or delay cycle.  - Beta HCG qual IFA urine - [facility] and [place]    Follow Up: For worsening symptoms (ie new fevers, worsening pain, etc), non-improvement as expected/discussed, questions regarding your medications or treatment plan. Discussed parameters for follow up and included in After Visit Summary given to patient.      Zenia Ramirez PA-C  [facility]

## 2018-01-30 ASSESSMENT — ASTHMA QUESTIONNAIRES: ACT_TOTALSCORE: 25

## 2018-01-30 ASSESSMENT — PATIENT HEALTH QUESTIONNAIRE - PHQ9: SUM OF ALL RESPONSES TO PHQ QUESTIONS 1-9: 2

## 2018-01-30 ASSESSMENT — ANXIETY QUESTIONNAIRES: GAD7 TOTAL SCORE: 0

## 2018-02-27 ENCOUNTER — TELEPHONE (OUTPATIENT)
Dept: FAMILY MEDICINE | Facility: CLINIC | Age: 38
End: 2018-02-27

## 2018-02-27 NOTE — TELEPHONE ENCOUNTER
Left message asking patient to return call.    Please inform pt of message below.  The most recent labs at  are dated 1/29/2018.  Is she referring to those labs or a more recent set of labs?

## 2018-02-27 NOTE — TELEPHONE ENCOUNTER
Pt would like to know what her lab results are from 2 weeks ago.  And also if they were sent over to her rheumatologist as well.

## 2018-02-28 ENCOUNTER — CARE COORDINATION (OUTPATIENT)
Dept: ONCOLOGY | Facility: CLINIC | Age: 38
End: 2018-02-28

## 2018-02-28 ENCOUNTER — DOCUMENTATION ONLY (OUTPATIENT)
Dept: LAB | Facility: CLINIC | Age: 38
End: 2018-02-28

## 2018-02-28 DIAGNOSIS — D70.8 AUTOIMMUNE NEUTROPENIA (H): Primary | ICD-10-CM

## 2018-02-28 DIAGNOSIS — D70.8 AUTOIMMUNE NEUTROPENIA (H): ICD-10-CM

## 2018-02-28 LAB
BASOPHILS # BLD AUTO: 0 10E9/L (ref 0–0.2)
BASOPHILS NFR BLD AUTO: 1 %
DIFFERENTIAL METHOD BLD: ABNORMAL
EOSINOPHIL # BLD AUTO: 0.2 10E9/L (ref 0–0.7)
EOSINOPHIL NFR BLD AUTO: 8.1 %
ERYTHROCYTE [DISTWIDTH] IN BLOOD BY AUTOMATED COUNT: 13.3 % (ref 10–15)
HCT VFR BLD AUTO: 35.6 % (ref 35–47)
HGB BLD-MCNC: 11.4 G/DL (ref 11.7–15.7)
IMM GRANULOCYTES # BLD: 0 10E9/L (ref 0–0.4)
IMM GRANULOCYTES NFR BLD: 0 %
LYMPHOCYTES # BLD AUTO: 1 10E9/L (ref 0.8–5.3)
LYMPHOCYTES NFR BLD AUTO: 48.2 %
MCH RBC QN AUTO: 29.8 PG (ref 26.5–33)
MCHC RBC AUTO-ENTMCNC: 32 G/DL (ref 31.5–36.5)
MCV RBC AUTO: 93 FL (ref 78–100)
MONOCYTES # BLD AUTO: 0.2 10E9/L (ref 0–1.3)
MONOCYTES NFR BLD AUTO: 12.2 %
NEUTROPHILS # BLD AUTO: 0.6 10E9/L (ref 1.6–8.3)
NEUTROPHILS NFR BLD AUTO: 30.5 %
PLATELET # BLD AUTO: 206 10E9/L (ref 150–450)
RBC # BLD AUTO: 3.82 10E12/L (ref 3.8–5.2)
WBC # BLD AUTO: 2 10E9/L (ref 4–11)

## 2018-02-28 PROCEDURE — 85025 COMPLETE CBC W/AUTO DIFF WBC: CPT | Performed by: INTERNAL MEDICINE

## 2018-02-28 PROCEDURE — 36415 COLL VENOUS BLD VENIPUNCTURE: CPT | Performed by: INTERNAL MEDICINE

## 2018-02-28 NOTE — TELEPHONE ENCOUNTER
Spoke with patient.  She was referring to the CBC blood draw from lab work done on 1/29/2018.  We only have results from a urine test - no blood tests completed.  She states blood was drawn and they were going to reach out to a provider for the orders.  In reviewing the chart, I noticed that (maybe) our , Shirlene, sent a message to Dr. Nichole for the orders.    Patient was disappointed this was not addressed properly and the blood draw was wasted.  I apologized and scheduled her for another lab appt.  Provider, please place lab orders.  I will ask the lab what they recall about this and see how to avoid it in the future.

## 2018-02-28 NOTE — PROGRESS NOTES
Patient called to inquire about getting an order for CBC as when she went last month there was a mix up with having an order obtained.  Writer reviewed chart and from notes, Dr. Nichole was requesting patient to remain on Neupogen for another month, then have labs checked with follow-up.

## 2018-03-01 NOTE — PROGRESS NOTES
Patient had her labs drawn today; reviewed with Dr. Nichole - she would like to see patient on her next available opening with labs same day (does not need to be before Neupogen.  Patient is aware of plan and will await phone call from scheduling.

## 2018-03-23 ENCOUNTER — ONCOLOGY VISIT (OUTPATIENT)
Dept: ONCOLOGY | Facility: CLINIC | Age: 38
End: 2018-03-23
Payer: COMMERCIAL

## 2018-03-23 VITALS
BODY MASS INDEX: 22.98 KG/M2 | WEIGHT: 143 LBS | HEIGHT: 66 IN | SYSTOLIC BLOOD PRESSURE: 126 MMHG | DIASTOLIC BLOOD PRESSURE: 73 MMHG | OXYGEN SATURATION: 95 % | TEMPERATURE: 98.7 F | HEART RATE: 59 BPM | RESPIRATION RATE: 15 BRPM

## 2018-03-23 DIAGNOSIS — D70.8 AUTOIMMUNE NEUTROPENIA (H): ICD-10-CM

## 2018-03-23 DIAGNOSIS — D70.8 OTHER NEUTROPENIA (H): Primary | ICD-10-CM

## 2018-03-23 LAB
DIFFERENTIAL METHOD BLD: ABNORMAL
EOSINOPHIL # BLD AUTO: 0.1 10E9/L (ref 0–0.7)
EOSINOPHIL NFR BLD AUTO: 8 %
ERYTHROCYTE [DISTWIDTH] IN BLOOD BY AUTOMATED COUNT: 14 % (ref 10–15)
HCT VFR BLD AUTO: 36.6 % (ref 35–47)
HGB BLD-MCNC: 11.5 G/DL (ref 11.7–15.7)
LYMPHOCYTES # BLD AUTO: 0.7 10E9/L (ref 0.8–5.3)
LYMPHOCYTES NFR BLD AUTO: 64 %
MCH RBC QN AUTO: 29.9 PG (ref 26.5–33)
MCHC RBC AUTO-ENTMCNC: 31.4 G/DL (ref 31.5–36.5)
MCV RBC AUTO: 95 FL (ref 78–100)
MONOCYTES # BLD AUTO: 0.2 10E9/L (ref 0–1.3)
MONOCYTES NFR BLD AUTO: 18 %
NEUTROPHILS # BLD AUTO: 0.1 10E9/L (ref 1.6–8.3)
NEUTROPHILS NFR BLD AUTO: 10 %
PLATELET # BLD AUTO: 242 10E9/L (ref 150–450)
PLATELET # BLD EST: ABNORMAL 10*3/UL
RBC # BLD AUTO: 3.85 10E12/L (ref 3.8–5.2)
RBC MORPH BLD: NORMAL
WBC # BLD AUTO: 1.1 10E9/L (ref 4–11)

## 2018-03-23 PROCEDURE — 36415 COLL VENOUS BLD VENIPUNCTURE: CPT | Performed by: INTERNAL MEDICINE

## 2018-03-23 PROCEDURE — 99214 OFFICE O/P EST MOD 30 MIN: CPT | Performed by: INTERNAL MEDICINE

## 2018-03-23 PROCEDURE — 85025 COMPLETE CBC W/AUTO DIFF WBC: CPT | Performed by: INTERNAL MEDICINE

## 2018-03-23 ASSESSMENT — PAIN SCALES - GENERAL: PAINLEVEL: NO PAIN (0)

## 2018-03-23 NOTE — MR AVS SNAPSHOT
After Visit Summary   3/23/2018    Alyssa Manzano    MRN: [id number]           Patient Information     Date Of Birth          1980        Visit Information        Provider Department      3/23/2018 4:00 PM Constance Nichole MD [facility]        Today's Diagnoses     Other neutropenia (H)    -  1       Follow-ups after your visit        Your next 10 appointments already scheduled     May 08, 2018  3:00 PM CDT   LAB with LAB ONC [facility] ([facility])    [address] [place] [id number] [phone]           Please do not eat 10-12 hours before your appointment if you are coming in fasting for labs on lipids, cholesterol, or glucose (sugar). This does not apply to pregnant women. Water, hot tea and black coffee (with nothing added) are okay. Do not drink other fluids, diet soda or chew gum.            Jun 19, 2018  2:45 PM CDT   LAB with LAB ONC [facility] ([facility])    [address] [place] [id number] [phone]           Please do not eat 10-12 hours before your appointment if you are coming in fasting for labs on lipids, cholesterol, or glucose (sugar). This does not apply to pregnant women. Water, hot tea and black coffee (with nothing added) are okay. Do not drink other fluids, diet soda or chew gum.            Jun 19, 2018  3:30 PM CDT   Return Visit with Constance Nichole MD   [facility] ([facility]    [address] [place] [id number] [phone]              Who to contact     If you have questions or need follow up information about today's clinic visit or your schedule please contact [facility] directly at [phone].  Normal or non-critical lab and imaging results will be communicated to you by MyChart, letter or phone within 4 business days after the clinic has received  "the results. If you do not hear from us within 7 days, please contact the clinic through [employer] or phone. If you have a critical or abnormal lab result, we will notify you by phone as soon as possible.  Submit refill requests through [employer] or call your pharmacy and they will forward the refill request to us. Please allow 3 business days for your refill to be completed.          Additional Information About Your Visit        [email or web address] Information     [employer] gives you secure access to your electronic health record. If you see a primary care provider, you can also send messages to your care team and make appointments. If you have questions, please call your primary care clinic.  If you do not have a primary care provider, please call [phone] and they will assist you.      [employer] is an electronic gateway that provides easy, online access to your medical records. With [employer], you can request a clinic appointment, read your test results, renew a prescription or communicate with your care team.     To access your existing account, please contact your [facility] or call [phone] for assistance.        Care EveryWhere ID     This is your Care EveryWhere ID. This could be used by other organizations to access your [facility] medical records  [id number]        Your Vitals Were     Pulse Temperature Respirations Height Last Period Pulse Oximetry    59 98.7  F (37.1  C) 15 1.689 m (5' 6.5\") 02/23/2018 95%    BMI (Body Mass Index)                   22.74 kg/m2            Blood Pressure from Last 3 Encounters:   03/23/18 126/73   01/29/18 100/62   11/17/17 103/70    Weight from Last 3 Encounters:   03/23/18 64.9 kg (143 lb)   01/29/18 63.2 kg (139 lb 4.8 oz)   11/17/17 63 kg (139 lb)              Today, you had the following     No orders found for display         Today's Medication Changes          These changes are accurate as of 3/23/18 11:59 PM.  If you have any questions, ask your " nurse or doctor.               These medicines have changed or have updated prescriptions.        Dose/Directions    * filgrastim 480 MCG/0.8ML Sosy syringe   Commonly known as:  NEUPOGEN   This may have changed:  Another medication with the same name was changed. Make sure you understand how and when to take each.   Used for:  Autoimmune neutropenia (H)   Changed by:  Constance Nichole MD        Inject 1 syringe or 0.8 ml  twice weekly   Quantity:  8 Syringe   Refills:  0       * filgrastim 480 MCG/0.8ML Sosy syringe   Commonly known as:  NEUPOGEN   This may have changed:    - medication strength  - how much to take  - how to take this  - when to take this  - additional instructions   Used for:  Other neutropenia (H)   Changed by:  Constance Nichole MD        Inject two to three times a week as instructed   Quantity:  12 Syringe   Refills:  11       * Notice:  This list has 2 medication(s) that are the same as other medications prescribed for you. Read the directions carefully, and ask your doctor or other care provider to review them with you.         Where to get your medicines      [email or web address] [address] [facility]  [address] [address] [address]     Phone:  [phone]     filgrastim 480 MCG/0.8ML Sosy syringe                Primary Care Provider Office Phone # Fax #    Tawana EMMA Colon [facility] [phone] [address] [facility] [address]        Equal Access to Services     JIM NEIL : Hadii jennifer ku hadasho Soomaali, waaxda luqadaha, qaybta kaalmada adeegyada, vinh martin . So [facility] [phone].    ATENCIÓN: Si habla español, tiene a cook disposición servicios gratuitos de asistencia lingüística. Evette al [phone].    We comply with applicable federal civil rights laws and [place] laws. We do not discriminate on the basis of race, color, national origin,  age, disability, sex, sexual orientation, or gender identity.            Thank you!     Thank you for choosing [facility]  for your care. Our goal is always to provide you with excellent care. Hearing back from our patients is one way we can continue to improve our services. Please take a few minutes to complete the written survey that you may receive in the mail after your visit with us. Thank you!             Your Updated Medication List - Protect others around you: Learn how to safely use, store and throw away your medicines at www.disposemymeds.org.          This list is accurate as of 3/23/18 11:59 PM.  Always use your most recent med list.                   Brand Name Dispense Instructions for use Diagnosis    albuterol 108 (90 BASE) MCG/ACT Inhaler    PROAIR HFA/PROVENTIL HFA/VENTOLIN HFA    1 Inhaler    Inhale 2 puffs into the lungs every 4 hours as needed for shortness of breath / dyspnea or wheezing    Intermittent asthma, uncomplicated       Ciclopirox 8 % Kit     34.6 mL    Externally apply 1 Application topically daily Apply to adjacent skin and affected nails daily. Remove with alcohol every 7 days.    Onychomycosis       * filgrastim 480 MCG/0.8ML Sosy syringe    NEUPOGEN    8 Syringe    Inject 1 syringe or 0.8 ml  twice weekly    Autoimmune neutropenia (H)       * filgrastim 480 MCG/0.8ML Sosy syringe    NEUPOGEN    12 Syringe    Inject two to three times a week as instructed    Other neutropenia (H)       loratadine 10 MG tablet    CLARITIN     Take 10 mg by mouth daily Reported on 2/14/2017        MULTIVITAMIN ADULT PO           PROBIOTIC ADVANCED PO           UNABLE TO FIND      MEDICATION NAME: hair skin nails        * Notice:  This list has 2 medication(s) that are the same as other medications prescribed for you. Read the directions carefully, and ask your doctor or other care provider to review them with you.

## 2018-03-23 NOTE — NURSING NOTE
"Oncology Rooming Note    March 23, 2018 3:59 PM   Alyssa Manzano is a 37 year old female who presents for:    Chief Complaint   Patient presents with     Oncology Clinic Visit     follow up      Initial Vitals: /73  Pulse 59  Temp 98.7  F (37.1  C)  Resp 15  Ht 1.689 m (5' 6.5\")  Wt 64.9 kg (143 lb)  LMP 02/23/2018  SpO2 95%  BMI 22.74 kg/m2 Estimated body mass index is 22.74 kg/(m^2) as calculated from the following:    Height as of this encounter: 1.689 m (5' 6.5\").    Weight as of this encounter: 64.9 kg (143 lb). Body surface area is 1.74 meters squared.  No Pain (0) Comment: Data Unavailable   Patient's last menstrual period was 02/23/2018.  Allergies reviewed: Yes  Medications reviewed: Yes    Medications: Medication refills not needed today.  Pharmacy name entered into [facility]:    [facility] - [place], [place] - [address], [address]  [place], [place] - [address] [facility] [id number] - [place], [place] - [address]        5 minutes for nursing intake (face to face time)     Ana Luisa Barreto LPN              "

## 2018-03-23 NOTE — PROGRESS NOTES
Hematology Follow-up Visit:  Mar 23, 2018      Referring Provider: Tawana Handley NP  OB/GYN: Dr. Jackson    Diagnosis: Autoimmune Neutropenia  -    Hematologic History:   Alyssa is a 36 year old female with neutropenia dating back at least to March 2007 when her ANC measured 1.2. Hemoglobin and platelet count were normal. At that time, she saw Dr. Abraham Ybarra for a hematology consultation, had an unremarkable peripheral blood smear, and was felt to have benign neutropenia. No treatment recommendations were made at that time.   Since then, she was noted to have severe neutropenia on several occasions. Her hematologic work-up for neutropenia was essentially negative with complete metabolic panel normal. Vitamin B12, folic acid, TSH, Hep B panel, Hep C, and HIV antibody unremarkable. CAT negative. Immunoglobulins revealed low normal IgG level at 690 mg/dL (normal 695-1620 mg/dL). IgA and IgM were normal. RA factor slightly elevated at 20 IU/mL. Peripheral blood smear revealed slight normochromic normocytic anemia and marked leukopenia due to neutropenia. Spleen was mildly enlarged on an US and rheumatoid factor was positive and she was seen by Dr. Solis but he did not feel that she had Felty's syndrome or a clinically significant connective tissue disorder.     We decided to proceed with a bone marrow biopsy for further evaluation. BMBX on 6/8/2011 showed:    Marrow cellularity of 50-60% with trilineage hematopoietic maturation  Left-shifted but complete granulocytic maturation  No increase in blasts; no morphologic evidence of dysplasia    Flow Cytometry showed:  Polytypic B lymphocytes  No aberrant immunophenotype on T lymphocytes  No increase in blasts  Cytogenetics showed:  46,XX[20]    No clonal chromosomal abnormality was detected among the 20 metaphase cells analyzed. Thus, no cytogenetic evidence of a malignant process was found.    She had antigranulocyte antibodies detected in her blood.   She has two  "daughters, the youngest is 3 years old and with both of her pregnancies her neutropenia has responded to PO prednisone 20 mg orally daily in the last month before delivery. She has three children at home.    Interval History:  Alyssa is a 37 year old female diagnosed with neutropenia present today for follow-up.   She was recommended to stay on Neupogen 480 mcg twice a week prophylactically in the past but has not done so. Some injections were painful for her in the past and she also experienced bone/muscle stiffness after the injections. However, by 11/2017 she has had severe neutropenia and mouth sores. She agreed to try lower dose Neupogen at that time, at 300 mcg subq twice a week. She has tolerated that dose better and her mouth sores have resolved and she has not had any infections since our last visit with her. Her ANC was 0.6 on 2/28/2018 (before her scheduled twice weekly Neupogen injection) and is only 0.1 today.    In addition, a complete 12 point  review of systems is negative.      Past medical, social, surgical, and family histories reviewed.    She has no family history of hematologic disorders or autoimmune disease.    Allergies:  Allergies as of 01/24/2013 - reviewed 01/24/2013    Allergen  Reaction  Noted       No known drug allergies    04/25/2011          Current Medications:  Current Outpatient Prescriptions   Medication     Ciclopirox 8 % KIT     Filgrastim (NEUPOGEN) 300 MCG/0.5ML SOSY syringe     filgrastim (NEUPOGEN) 480 MCG/0.8ML SOSY syringe     albuterol (PROAIR HFA/PROVENTIL HFA/VENTOLIN HFA) 108 (90 BASE) MCG/ACT Inhaler     loratadine (CLARITIN) 10 MG tablet     No current facility-administered medications for this visit.      Facility-Administered Medications Ordered in Other Visits   Medication     ketorolac (TORADOL) injection         Physical Exam:    /73  Pulse 59  Temp 98.7  F (37.1  C)  Resp 15  Ht 1.689 m (5' 6.5\")  Wt 64.9 kg (143 lb)  LMP 02/23/2018  SpO2 95%  " BMI 22.74 kg/m2        GENERAL APPEARANCE: Healthy, alert and in no acute distress.  HEENT: Sclerae anicteric. No mouth sores  Hear: S1S2 reg  Lungs: CTA b/l  MUSCULOSKELETAL: Extremities without gross deformities noted. No edema b/l LE  Skin: no lesions  NEURO: Alert and oriented x 3.  PSYCHIATRIC: Mentation and affect appear normal.    Laboratory Studies:  Orders Only on 03/23/2018   Component Date Value Ref Range Status     WBC 03/23/2018 1.1* 4.0 - 11.0 10e9/L Final    Comment: Critical Value called to and read back by  ESTRADA IN ONCO ON 3/23/2018 AT 1526 BY SG       RBC Count 03/23/2018 3.85  3.8 - 5.2 10e12/L Final     Hemoglobin 03/23/2018 11.5* 11.7 - 15.7 g/dL Final     Hematocrit 03/23/2018 36.6  35.0 - 47.0 % Final     MCV 03/23/2018 95  78 - 100 fl Final     MCH 03/23/2018 29.9  26.5 - 33.0 pg Final     MCHC 03/23/2018 31.4* 31.5 - 36.5 g/dL Final     RDW 03/23/2018 14.0  10.0 - 15.0 % Final     Platelet Count 03/23/2018 242  150 - 450 10e9/L Final     Diff Method 03/23/2018 PENDING   Incomplete     ANC 0.1    ASSESSMENT/PLAN:  Alyssa is a radha 36 year old woman with 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  1. Neutropenia, autoimmune, with presence of antigranulocyte antibodies.   Her ANC is still low on 300 mcg of Neupogen twice a week but mouth sores have resolved.   We'll increase Neupogen to 480mcg twice a week. She is willing to give it a try. She had multiple questions about long term use of Neupogen. I have relayed to her that generally long term gCSF use is safe. It is not associated with increased risk of AML. (The use of granulocyte colony-stimulating factor for treatment of autoimmune neutropenia. Zacarias RICH1, Zacarias RODRIGUEZG. Curr Opin Hematol. 2001 May;8(3):165-9).   We'll shoot for micaela ANC of >1.0 where usually no serious infections occur and oral aphthae disappear.  We'll have the patient return in 6-8 weeks for CBCd and see her back in 3 months in a f/up visit, with labs.  At the end of  the visit the patient verbalized understanding, denied any further questions, and concurred with the plan of care.

## 2018-03-23 NOTE — LETTER
3/23/2018         RE: Alyssa Manzano  [address] [facility] [address]        Dear Colleague,    Thank you for referring your patient, Alyssa Manzano, to the [facility]. Please see a copy of my visit note below.    Hematology Follow-up Visit:  Mar 23, 2018      Referring Provider: Tawana Handley NP  OB/GYN: Dr. Jackson    Diagnosis: Autoimmune Neutropenia  -    Hematologic History:   Alyssa is a 36 year old female with neutropenia dating back at least to March 2007 when her ANC measured 1.2. Hemoglobin and platelet count were normal. At that time, she saw Dr. Abraham Ybarra for a hematology consultation, had an unremarkable peripheral blood smear, and was felt to have benign neutropenia. No treatment recommendations were made at that time.   Since then, she was noted to have severe neutropenia on several occasions. Her hematologic work-up for neutropenia was essentially negative with complete metabolic panel normal. Vitamin B12, folic acid, TSH, Hep B panel, Hep C, and HIV antibody unremarkable. CAT negative. Immunoglobulins revealed low normal IgG level at 690 mg/dL (normal 695-1620 mg/dL). IgA and IgM were normal. RA factor slightly elevated at 20 IU/mL. Peripheral blood smear revealed slight normochromic normocytic anemia and marked leukopenia due to neutropenia. Spleen was mildly enlarged on an US and rheumatoid factor was positive and she was seen by Dr. Solis but he did not feel that she had Felty's syndrome or a clinically significant connective tissue disorder.     We decided to proceed with a bone marrow biopsy for further evaluation. BMBX on 6/8/2011 showed:    Marrow cellularity of 50-60% with trilineage hematopoietic maturation  Left-shifted but complete granulocytic maturation  No increase in blasts; no morphologic evidence of dysplasia    Flow Cytometry showed:  Polytypic B lymphocytes  No aberrant immunophenotype on T lymphocytes  No increase in  blasts  Cytogenetics showed:  46,XX[20]    No clonal chromosomal abnormality was detected among the 20 metaphase cells analyzed. Thus, no cytogenetic evidence of a malignant process was found.    She had antigranulocyte antibodies detected in her blood.   She has two daughters, the youngest is 3 years old and with both of her pregnancies her neutropenia has responded to PO prednisone 20 mg orally daily in the last month before delivery. She has three children at home.    Interval History:  Alyssa is a 37 year old female diagnosed with neutropenia present today for follow-up.   She was recommended to stay on Neupogen 480 mcg twice a week prophylactically in the past but has not done so. Some injections were painful for her in the past and she also experienced bone/muscle stiffness after the injections. However, by 11/2017 she has had severe neutropenia and mouth sores. She agreed to try lower dose Neupogen at that time, at 300 mcg subq twice a week. She has tolerated that dose better and her mouth sores have resolved and she has not had any infections since our last visit with her. Her ANC was 0.6 on 2/28/2018 (before her scheduled twice weekly Neupogen injection) and is only 0.1 today.    In addition, a complete 12 point  review of systems is negative.      Past medical, social, surgical, and family histories reviewed.    She has no family history of hematologic disorders or autoimmune disease.    Allergies:  Allergies as of 01/24/2013 - reviewed 01/24/2013    Allergen  Reaction  Noted       No known drug allergies    04/25/2011          Current Medications:  Current Outpatient Prescriptions   Medication     Ciclopirox 8 % KIT     Filgrastim (NEUPOGEN) 300 MCG/0.5ML SOSY syringe     filgrastim (NEUPOGEN) 480 MCG/0.8ML SOSY syringe     albuterol (PROAIR HFA/PROVENTIL HFA/VENTOLIN HFA) 108 (90 BASE) MCG/ACT Inhaler     loratadine (CLARITIN) 10 MG tablet     No current facility-administered medications for this  "visit.      Facility-Administered Medications Ordered in Other Visits   Medication     ketorolac (TORADOL) injection         Physical Exam:    /73  Pulse 59  Temp 98.7  F (37.1  C)  Resp 15  Ht 1.689 m (5' 6.5\")  Wt 64.9 kg (143 lb)  LMP 02/23/2018  SpO2 95%  BMI 22.74 kg/m2        GENERAL APPEARANCE: Healthy, alert and in no acute distress.  HEENT: Sclerae anicteric. No mouth sores  Hear: S1S2 reg  Lungs: CTA b/l  MUSCULOSKELETAL: Extremities without gross deformities noted. No edema b/l LE  Skin: no lesions  NEURO: Alert and oriented x 3.  PSYCHIATRIC: Mentation and affect appear normal.    Laboratory Studies:  Orders Only on 03/23/2018   Component Date Value Ref Range Status     WBC 03/23/2018 1.1* 4.0 - 11.0 10e9/L Final    Comment: Critical Value called to and read back by  ESTRADA IN ONCO ON 3/23/2018 AT 1526 BY SG       RBC Count 03/23/2018 3.85  3.8 - 5.2 10e12/L Final     Hemoglobin 03/23/2018 11.5* 11.7 - 15.7 g/dL Final     Hematocrit 03/23/2018 36.6  35.0 - 47.0 % Final     MCV 03/23/2018 95  78 - 100 fl Final     MCH 03/23/2018 29.9  26.5 - 33.0 pg Final     MCHC 03/23/2018 31.4* 31.5 - 36.5 g/dL Final     RDW 03/23/2018 14.0  10.0 - 15.0 % Final     Platelet Count 03/23/2018 242  150 - 450 10e9/L Final     Diff Method 03/23/2018 PENDING   Incomplete     ANC 0.1    ASSESSMENT/PLAN:  Alyssa is a radha 36 year old woman with 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  1. Neutropenia, autoimmune, with presence of antigranulocyte antibodies.   Her ANC is still low on 300 mcg of Neupogen twice a week but mouth sores have resolved.   We'll increase Neupogen to 480mcg twice a week. She is willing to give it a try. She had multiple questions about long term use of Neupogen. I have relayed to her that generally long term gCSF use is safe. It is not associated with increased risk of AML. (The use of granulocyte colony-stimulating factor for treatment of autoimmune neutropenia. Zacarias RICH1, Zacarias RODRIGUEZG. " Curr Opin Hematol. 2001 May;8(3):165-9).   We'll shoot for micaela ANC of >1.0 where usually no serious infections occur and oral aphthae disappear.  We'll have the patient return in 6-8 weeks for CBCd and see her back in 3 months in a f/up visit, with labs.  At the end of the visit the patient verbalized understanding, denied any further questions, and concurred with the plan of care.         Again, thank you for allowing me to participate in the care of your patient.        Sincerely,        Constance Nichole MD, MD

## 2018-04-17 ENCOUNTER — TELEPHONE (OUTPATIENT)
Dept: ONCOLOGY | Facility: CLINIC | Age: 38
End: 2018-04-17

## 2018-04-17 NOTE — TELEPHONE ENCOUNTER
Received call from pt stating insurance company has changed pharmacies. She now has to have prescription for neupogen filled at [facility]. [facility] will not cover neupogen and she needs to use [facility] instead. Message sent to Dr Nichole to place new Rx for [facility]. Pharmacy changed in [facility].    Flower Hilton RN, BSN, OCN

## 2018-06-19 ENCOUNTER — ONCOLOGY VISIT (OUTPATIENT)
Dept: ONCOLOGY | Facility: CLINIC | Age: 38
End: 2018-06-19
Payer: COMMERCIAL

## 2018-06-19 VITALS
HEART RATE: 61 BPM | DIASTOLIC BLOOD PRESSURE: 78 MMHG | RESPIRATION RATE: 16 BRPM | BODY MASS INDEX: 21.85 KG/M2 | SYSTOLIC BLOOD PRESSURE: 116 MMHG | OXYGEN SATURATION: 97 % | TEMPERATURE: 98.4 F | WEIGHT: 137.4 LBS

## 2018-06-19 DIAGNOSIS — D70.8 AUTOIMMUNE NEUTROPENIA (H): Primary | ICD-10-CM

## 2018-06-19 DIAGNOSIS — D70.8 AUTOIMMUNE NEUTROPENIA (H): ICD-10-CM

## 2018-06-19 DIAGNOSIS — D70.8 OTHER NEUTROPENIA (H): ICD-10-CM

## 2018-06-19 LAB
BASOPHILS # BLD AUTO: 0 10E9/L (ref 0–0.2)
BASOPHILS NFR BLD AUTO: 0.9 %
DIFFERENTIAL METHOD BLD: ABNORMAL
EOSINOPHIL # BLD AUTO: 0.1 10E9/L (ref 0–0.7)
EOSINOPHIL NFR BLD AUTO: 2.7 %
ERYTHROCYTE [DISTWIDTH] IN BLOOD BY AUTOMATED COUNT: 14.1 % (ref 10–15)
HCT VFR BLD AUTO: 38.6 % (ref 35–47)
HGB BLD-MCNC: 12.1 G/DL (ref 11.7–15.7)
IMM GRANULOCYTES # BLD: 0 10E9/L (ref 0–0.4)
IMM GRANULOCYTES NFR BLD: 0 %
LYMPHOCYTES # BLD AUTO: 0.8 10E9/L (ref 0.8–5.3)
LYMPHOCYTES NFR BLD AUTO: 34.4 %
MCH RBC QN AUTO: 29.5 PG (ref 26.5–33)
MCHC RBC AUTO-ENTMCNC: 31.3 G/DL (ref 31.5–36.5)
MCV RBC AUTO: 94 FL (ref 78–100)
MONOCYTES # BLD AUTO: 0.2 10E9/L (ref 0–1.3)
MONOCYTES NFR BLD AUTO: 8.9 %
NEUTROPHILS # BLD AUTO: 1.2 10E9/L (ref 1.6–8.3)
NEUTROPHILS NFR BLD AUTO: 53.1 %
PLATELET # BLD AUTO: 270 10E9/L (ref 150–450)
RBC # BLD AUTO: 4.1 10E12/L (ref 3.8–5.2)
WBC # BLD AUTO: 2.2 10E9/L (ref 4–11)

## 2018-06-19 PROCEDURE — 85025 COMPLETE CBC W/AUTO DIFF WBC: CPT | Performed by: INTERNAL MEDICINE

## 2018-06-19 PROCEDURE — 36415 COLL VENOUS BLD VENIPUNCTURE: CPT | Performed by: INTERNAL MEDICINE

## 2018-06-19 PROCEDURE — 99213 OFFICE O/P EST LOW 20 MIN: CPT | Performed by: INTERNAL MEDICINE

## 2018-06-19 ASSESSMENT — PAIN SCALES - GENERAL: PAINLEVEL: NO PAIN (0)

## 2018-06-19 NOTE — NURSING NOTE
"Oncology Rooming Note    June 19, 2018 3:43 PM   Alyssa Manzano is a 37 year old female who presents for:    Chief Complaint   Patient presents with     Oncology Clinic Visit     3 mon f/u     Initial Vitals: /78  Pulse 61  Temp 98.4  F (36.9  C) (Oral)  Resp 16  Wt 62.3 kg (137 lb 6.4 oz)  SpO2 97%  BMI 21.85 kg/m2 Estimated body mass index is 21.85 kg/(m^2) as calculated from the following:    Height as of 3/23/18: 1.689 m (5' 6.5\").    Weight as of this encounter: 62.3 kg (137 lb 6.4 oz). Body surface area is 1.71 meters squared.  No Pain (0) Comment: Data Unavailable   No LMP recorded.  Allergies reviewed: Yes  Medications reviewed: Yes    Medications: Medication refills not needed today.  Pharmacy name entered into Lovely:    [facility] - [place], [place] - [address], [id number]  [id number] - [place], [place] - [address]  [facility] [place], [place] - [address]         6 minutes for nursing intake (face to face time)     BRANDIE MCGUIRE RN              "

## 2018-06-19 NOTE — MR AVS SNAPSHOT
After Visit Summary   6/19/2018    Alyssa Manzano    MRN: [id number]           Patient Information     Date Of Birth          1980        Visit Information        Provider Department      6/19/2018 3:30 PM Constance Nichole MD [facility]        Today's Diagnoses     Autoimmune neutropenia (H)    -  1    Other neutropenia (H)           Follow-ups after your visit        Your next 10 appointments already scheduled     Sep 20, 2018  2:30 PM CDT   LAB with LAB ONC [facility] ([facility])    [address] [place] [id number]   [phone]           Please do not eat 10-12 hours before your appointment if you are coming in fasting for labs on lipids, cholesterol, or glucose (sugar). This does not apply to pregnant women. Water, hot tea and black coffee (with nothing added) are okay. Do not drink other fluids, diet soda or chew gum.            Jun 20, 2019 11:45 AM CDT   LAB with LAB ONC [facility] ([facility])    [address] [place] [id number]   [phone]           Please do not eat 10-12 hours before your appointment if you are coming in fasting for labs on lipids, cholesterol, or glucose (sugar). This does not apply to pregnant women. Water, hot tea and black coffee (with nothing added) are okay. Do not drink other fluids, diet soda or chew gum.            Jun 20, 2019 12:30 PM CDT   Return Visit with Constance Nichole MD   [facility] ([facility])    [address] [place] [id number]   [phone]              Future tests that were ordered for you today     Open Standing Orders        Priority Remaining Interval Expires Ordered    CBC with platelets differential Routine 6/6 3 months 6/19/2019 6/19/2018            Who to contact     If you have questions or need follow up information about today's clinic visit or  your schedule please contact [facility] directly at [phone].  Normal or non-critical lab and imaging results will be communicated to you by [facility], letter or phone within 4 business days after the clinic has received the results. If you do not hear from us within 7 days, please contact the clinic through [facility] or phone. If you have a critical or abnormal lab result, we will notify you by phone as soon as possible.  Submit refill requests through [employer] or call your pharmacy and they will forward the refill request to us. Please allow 3 business days for your refill to be completed.          Additional Information About Your Visit        [employer] Information     [employer] gives you secure access to your electronic health record. If you see a primary care provider, you can also send messages to your care team and make appointments. If you have questions, please call your primary care clinic.  If you do not have a primary care provider, please call [phone] and they will assist you.      [employer] is an electronic gateway that provides easy, online access to your medical records. With [employer], you can request a clinic appointment, read your test results, renew a prescription or communicate with your care team.     To access your existing account, please contact your [facility] or call [phone] for assistance.        Care EveryWhere ID     This is your Care EveryWhere ID. This could be used by other organizations to access your [facility] medical records  [id number]        Your Vitals Were     Pulse Temperature Respirations Pulse Oximetry BMI (Body Mass Index)       61 98.4  F (36.9  C) (Oral) 16 97% 21.85 kg/m2        Blood Pressure from Last 3 Encounters:   06/19/18 116/78   03/23/18 126/73   01/29/18 100/62    Weight from Last 3 Encounters:   06/19/18 62.3 kg (137 lb 6.4 oz)   03/23/18 64.9 kg (143 lb)   01/29/18 63.2 kg (139 lb 4.8 oz)                  Where to get your medicines      These medications were sent to [facility] [place], [place] - [address]  [address], [facility] [address]     Phone:  [phone]     filgrastim-sndz 480 MCG/0.8ML syringe          Primary Care Provider Office Phone # Fax #    Tawana Handley, APRN [facility] [phone] [address] [address]        Equal Access to Services     [facility]: Hadii aad ku hadasho Soomaali, waaxda luqadaha, qaybta kaalmada adeegyada, waxay idiin hayaan adeeg kharacandy martin . So [facility] [phone].    ATENCIÓN: Si habla español, tiene a cook disposición servicios gratuitos de asistencia lingüística. [facility] [phone].    We comply with applicable federal civil rights laws and [place] laws. We do not discriminate on the basis of race, color, national origin, age, disability, sex, sexual orientation, or gender identity.            Thank you!     Thank you for choosing [facility]  for your care. Our goal is always to provide you with excellent care. Hearing back from our patients is one way we can continue to improve our services. Please take a few minutes to complete the written survey that you may receive in the mail after your visit with us. Thank you!             Your Updated Medication List - Protect others around you: Learn how to safely use, store and throw away your medicines at www.disposemymeds.org.          This list is accurate as of 6/19/18 11:59 PM.  Always use your most recent med list.                   Brand Name Dispense Instructions for use Diagnosis    albuterol 108 (90 Base) MCG/ACT Inhaler    PROAIR HFA/PROVENTIL HFA/VENTOLIN HFA    1 Inhaler    Inhale 2 puffs into the lungs every 4 hours as needed for shortness of breath / dyspnea or wheezing    Intermittent asthma, uncomplicated       filgrastim-sndz 480 MCG/0.8ML syringe    ZARXIO    12 Syringe    Inject two to three times a  week as instructed    Other neutropenia (H)       MULTIVITAMIN ADULT PO           PROBIOTIC ADVANCED PO           UNABLE TO FIND      MEDICATION NAME: hair skin nails

## 2018-06-19 NOTE — LETTER
6/19/2018         RE: Alyssa Manzano  [address] [facility] [address]        Dear Colleague,    Thank you for referring your patient, Alyssa Manzano, to the [facility]. Please see a copy of my visit note below.    Hematology Follow-up Visit:  Jun 19, 2018      Referring Provider: Tawana Handley NP  OB/GYN: Dr. Jackson    Diagnosis: Autoimmune Neutropenia  -    Hematologic History:   Alyssa is a 36 year old female with neutropenia dating back at least to March 2007 when her ANC measured 1.2. Hemoglobin and platelet count were normal. At that time, she saw Dr. Abraham Ybarra for a hematology consultation, had an unremarkable peripheral blood smear, and was felt to have benign neutropenia. No treatment recommendations were made at that time.   Since then, she was noted to have severe neutropenia on several occasions. Her hematologic work-up for neutropenia was essentially negative with complete metabolic panel normal. Vitamin B12, folic acid, TSH, Hep B panel, Hep C, and HIV antibody unremarkable. CAT negative. Immunoglobulins revealed low normal IgG level at 690 mg/dL (normal 695-1620 mg/dL). IgA and IgM were normal. RA factor slightly elevated at 20 IU/mL. Peripheral blood smear revealed slight normochromic normocytic anemia and marked leukopenia due to neutropenia. Spleen was mildly enlarged on an US and rheumatoid factor was positive and she was seen by Dr. Solis but he did not feel that she had Felty's syndrome or a clinically significant connective tissue disorder.     We decided to proceed with a bone marrow biopsy for further evaluation. BMBX on 6/8/2011 showed:    Marrow cellularity of 50-60% with trilineage hematopoietic maturation  Left-shifted but complete granulocytic maturation  No increase in blasts; no morphologic evidence of dysplasia    Flow Cytometry showed:  Polytypic B lymphocytes  No aberrant immunophenotype on T lymphocytes  No increase in  blasts  Cytogenetics showed:  46,XX[20]    No clonal chromosomal abnormality was detected among the 20 metaphase cells analyzed. Thus, no cytogenetic evidence of a malignant process was found.    She had antigranulocyte antibodies detected in her blood.   She has two daughters, the youngest is 3 years old and with both of her pregnancies her neutropenia has responded to PO prednisone 20 mg orally daily in the last month before delivery. She has three children at home.    Interval History:  Alyssa is a 37 year old female diagnosed with neutropenia present today for follow-up.   She was recommended to increase to Neupogen 480 mcg twice a week prophylactically at our last visit, and she has done so. Her injections have been uneventful. Last injection of Neupogen was on 6/15 and today (6/19) anc is 1.2. She usually injects Neupogen at home on Mondays and Thursdays. Her mouth sore have resolved and have not recurred on this dose of Neupogen. She has had no other infections.   In addition, a complete 12 point  review of systems is negative.      Past medical, social, surgical, and family histories reviewed.    She has no family history of hematologic disorders or autoimmune disease.    Allergies:  Allergies as of 01/24/2013 - reviewed 01/24/2013    Allergen  Reaction  Noted       No known drug allergies    04/25/2011          Current Medications:  Current Outpatient Prescriptions   Medication     albuterol (PROAIR HFA/PROVENTIL HFA/VENTOLIN HFA) 108 (90 BASE) MCG/ACT Inhaler     filgrastim-sndz (ZARXIO) 480 MCG/0.8ML syringe     Multiple Vitamins-Minerals (MULTIVITAMIN ADULT PO)     Probiotic Product (PROBIOTIC ADVANCED PO)     UNABLE TO FIND     No current facility-administered medications for this visit.      Facility-Administered Medications Ordered in Other Visits   Medication     ketorolac (TORADOL) injection         Physical Exam:    /78  Pulse 61  Temp 98.4  F (36.9  C) (Oral)  Resp 16  Wt 62.3 kg (137  lb 6.4 oz)  SpO2 97%  BMI 21.85 kg/m2      GENERAL APPEARANCE: Healthy, alert and in no acute distress.  MUSCULOSKELETAL: Extremities without gross deformities noted. No edema b/l LE  Skin: no lesions  NEURO: Alert and oriented x 3.  PSYCHIATRIC: Mentation and affect appear normal.    Laboratory Studies:  Orders Only on 06/19/2018   Component Date Value Ref Range Status     WBC 06/19/2018 2.2* 4.0 - 11.0 10e9/L Final     RBC Count 06/19/2018 4.10  3.8 - 5.2 10e12/L Final     Hemoglobin 06/19/2018 12.1  11.7 - 15.7 g/dL Final     Hematocrit 06/19/2018 38.6  35.0 - 47.0 % Final     MCV 06/19/2018 94  78 - 100 fl Final     MCH 06/19/2018 29.5  26.5 - 33.0 pg Final     MCHC 06/19/2018 31.3* 31.5 - 36.5 g/dL Final     RDW 06/19/2018 14.1  10.0 - 15.0 % Final     Platelet Count 06/19/2018 270  150 - 450 10e9/L Final     Diff Method 06/19/2018 Automated Method   Final     % Neutrophils 06/19/2018 53.1  % Final     % Lymphocytes 06/19/2018 34.4  % Final     % Monocytes 06/19/2018 8.9  % Final     % Eosinophils 06/19/2018 2.7  % Final     % Basophils 06/19/2018 0.9  % Final     % Immature Granulocytes 06/19/2018 0.0  % Final     Absolute Neutrophil 06/19/2018 1.2* 1.6 - 8.3 10e9/L Final     Absolute Lymphocytes 06/19/2018 0.8  0.8 - 5.3 10e9/L Final     Absolute Monocytes 06/19/2018 0.2  0.0 - 1.3 10e9/L Final     Absolute Eosinophils 06/19/2018 0.1  0.0 - 0.7 10e9/L Final     Absolute Basophils 06/19/2018 0.0  0.0 - 0.2 10e9/L Final     Abs Immature Granulocytes 06/19/2018 0.0  0 - 0.4 10e9/L Final       ASSESSMENT/PLAN:  Alyssa is a radha 37 year old woman with 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  1. Neutropenia, autoimmune, with presence of antigranulocyte antibodies.   Micaela ANC 1.2. Continue Neupogen  480mcg twice a week. Rx renewed for one year. Goal micaela ANC of >1.0 where usually no serious infections occur and oral aphthae disappear. She will be coming in every 3 months to check her micaela ANC and I will  plan to see her back in one year with cbcd.  She will need annual influenza vaccination.  At the end of the visit the patient verbalized understanding, denied any further questions, and concurred with the plan of care.         Again, thank you for allowing me to participate in the care of your patient.        Sincerely,        Constance Nichole MD, MD

## 2018-06-19 NOTE — PROGRESS NOTES
Hematology Follow-up Visit:  Jun 19, 2018      Referring Provider: Tawana Handley NP  OB/GYN: Dr. Jackson    Diagnosis: Autoimmune Neutropenia  -    Hematologic History:   Alyssa is a 36 year old female with neutropenia dating back at least to March 2007 when her ANC measured 1.2. Hemoglobin and platelet count were normal. At that time, she saw Dr. Abraham Ybarra for a hematology consultation, had an unremarkable peripheral blood smear, and was felt to have benign neutropenia. No treatment recommendations were made at that time.   Since then, she was noted to have severe neutropenia on several occasions. Her hematologic work-up for neutropenia was essentially negative with complete metabolic panel normal. Vitamin B12, folic acid, TSH, Hep B panel, Hep C, and HIV antibody unremarkable. CAT negative. Immunoglobulins revealed low normal IgG level at 690 mg/dL (normal 695-1620 mg/dL). IgA and IgM were normal. RA factor slightly elevated at 20 IU/mL. Peripheral blood smear revealed slight normochromic normocytic anemia and marked leukopenia due to neutropenia. Spleen was mildly enlarged on an US and rheumatoid factor was positive and she was seen by Dr. Solis but he did not feel that she had Felty's syndrome or a clinically significant connective tissue disorder.     We decided to proceed with a bone marrow biopsy for further evaluation. BMBX on 6/8/2011 showed:    Marrow cellularity of 50-60% with trilineage hematopoietic maturation  Left-shifted but complete granulocytic maturation  No increase in blasts; no morphologic evidence of dysplasia    Flow Cytometry showed:  Polytypic B lymphocytes  No aberrant immunophenotype on T lymphocytes  No increase in blasts  Cytogenetics showed:  46,XX[20]    No clonal chromosomal abnormality was detected among the 20 metaphase cells analyzed. Thus, no cytogenetic evidence of a malignant process was found.    She had antigranulocyte antibodies detected in her blood.   She has two  daughters, the youngest is 3 years old and with both of her pregnancies her neutropenia has responded to PO prednisone 20 mg orally daily in the last month before delivery. She has three children at home.    Interval History:  Alyssa is a 37 year old female diagnosed with neutropenia present today for follow-up.   She was recommended to increase to Neupogen 480 mcg twice a week prophylactically at our last visit, and she has done so. Her injections have been uneventful. Last injection of Neupogen was on 6/15 and today (6/19) anc is 1.2. She usually injects Neupogen at home on Mondays and Thursdays. Her mouth sore have resolved and have not recurred on this dose of Neupogen. She has had no other infections.   In addition, a complete 12 point  review of systems is negative.      Past medical, social, surgical, and family histories reviewed.    She has no family history of hematologic disorders or autoimmune disease.    Allergies:  Allergies as of 01/24/2013 - reviewed 01/24/2013    Allergen  Reaction  Noted       No known drug allergies    04/25/2011          Current Medications:  Current Outpatient Prescriptions   Medication     albuterol (PROAIR HFA/PROVENTIL HFA/VENTOLIN HFA) 108 (90 BASE) MCG/ACT Inhaler     filgrastim-sndz (ZARXIO) 480 MCG/0.8ML syringe     Multiple Vitamins-Minerals (MULTIVITAMIN ADULT PO)     Probiotic Product (PROBIOTIC ADVANCED PO)     UNABLE TO FIND     No current facility-administered medications for this visit.      Facility-Administered Medications Ordered in Other Visits   Medication     ketorolac (TORADOL) injection         Physical Exam:    /78  Pulse 61  Temp 98.4  F (36.9  C) (Oral)  Resp 16  Wt 62.3 kg (137 lb 6.4 oz)  SpO2 97%  BMI 21.85 kg/m2      GENERAL APPEARANCE: Healthy, alert and in no acute distress.  MUSCULOSKELETAL: Extremities without gross deformities noted. No edema b/l LE  Skin: no lesions  NEURO: Alert and oriented x 3.  PSYCHIATRIC: Mentation and  affect appear normal.    Laboratory Studies:  Orders Only on 06/19/2018   Component Date Value Ref Range Status     WBC 06/19/2018 2.2* 4.0 - 11.0 10e9/L Final     RBC Count 06/19/2018 4.10  3.8 - 5.2 10e12/L Final     Hemoglobin 06/19/2018 12.1  11.7 - 15.7 g/dL Final     Hematocrit 06/19/2018 38.6  35.0 - 47.0 % Final     MCV 06/19/2018 94  78 - 100 fl Final     MCH 06/19/2018 29.5  26.5 - 33.0 pg Final     MCHC 06/19/2018 31.3* 31.5 - 36.5 g/dL Final     RDW 06/19/2018 14.1  10.0 - 15.0 % Final     Platelet Count 06/19/2018 270  150 - 450 10e9/L Final     Diff Method 06/19/2018 Automated Method   Final     % Neutrophils 06/19/2018 53.1  % Final     % Lymphocytes 06/19/2018 34.4  % Final     % Monocytes 06/19/2018 8.9  % Final     % Eosinophils 06/19/2018 2.7  % Final     % Basophils 06/19/2018 0.9  % Final     % Immature Granulocytes 06/19/2018 0.0  % Final     Absolute Neutrophil 06/19/2018 1.2* 1.6 - 8.3 10e9/L Final     Absolute Lymphocytes 06/19/2018 0.8  0.8 - 5.3 10e9/L Final     Absolute Monocytes 06/19/2018 0.2  0.0 - 1.3 10e9/L Final     Absolute Eosinophils 06/19/2018 0.1  0.0 - 0.7 10e9/L Final     Absolute Basophils 06/19/2018 0.0  0.0 - 0.2 10e9/L Final     Abs Immature Granulocytes 06/19/2018 0.0  0 - 0.4 10e9/L Final       ASSESSMENT/PLAN:  Alyssa is a radha 37 year old woman with 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  1. Neutropenia, autoimmune, with presence of antigranulocyte antibodies.   Micaela ANC 1.2. Continue Neupogen  480mcg twice a week. Rx renewed for one year. Goal micaela ANC of >1.0 where usually no serious infections occur and oral aphthae disappear. She will be coming in every 3 months to check her micaela ANC and I will plan to see her back in one year with cbcd.  She will need annual influenza vaccination.  At the end of the visit the patient verbalized understanding, denied any further questions, and concurred with the plan of care.

## 2018-06-25 ENCOUNTER — CARE COORDINATION (OUTPATIENT)
Dept: ONCOLOGY | Facility: CLINIC | Age: 38
End: 2018-06-25

## 2018-06-25 NOTE — PROGRESS NOTES
Patient's medication for Zarxio had to be forwarded from [facility] to [facility] forwarded it to [phone].

## 2018-11-07 NOTE — PROGRESS NOTES
"  SUBJECTIVE:   Alyssa Manzano is a 37 year old female who presents to clinic today for the following health issues:    History of Present Illness     Diet:  Regular (no restrictions)  Frequency of exercise:  1 day/week  Duration of exercise:  Less than 15 minutes  Taking medications regularly:  Not Applicable  Medication side effects:  Muscle aches  Additional concerns today:  Yes    Asthma Follow-Up  Was ACT completed today?    Yes    ACT Total Scores 11/9/2018   ACT TOTAL SCORE (Goal Greater than or Equal to 20) 25   In the past 12 months, how many times did you visit the emergency room for your asthma without being admitted to the hospital? 0   In the past 12 months, how many times were you hospitalized overnight because of your asthma? 0   Recent asthma triggers that patient is dealing with: None    Problem list and histories reviewed & adjusted, as indicated.  Additional history: as documented    Reviewed and updated patient's medical, surgical, and family history.    She is in the process of getting a divorce, just waiting for her husbands signature. She is in the process of closing on the contract of her new house. Her kids understand and are sad, but doing well. She is dating and  \"seeing a bunch of people, because why not.\" She would like to have STD testing done today; she denies any high risk partners.     Patient would like to start a contraceptive that prevents her from menstruating each month as she has a tubal ligation, but also a form of contraception that does not cause her to gain weight. She reports that her menstrual cycle is about 10 days long, she is irritable and short tempered, and has bad menstrual cramps. She has been on depo-provera injection and an oral contraceptive in the past, but is unsure if either method caused increased depression symptoms, but there were other precipitating factors at the time she was on them. She is not currently on any medications for her mood.     She " reports her asthma is well controlled and that she has not needed to use her inhaler in the past 6 months.     She has already received her influenza vaccination this season.     Patient Active Problem List   Diagnosis     Chronic rhinitis     Abnormal Pap smear, low grade squamous intraepithelial lesion (LGSIL)     CARDIOVASCULAR SCREENING; LDL GOAL LESS THAN 160     Urticaria     Autoimmune neutropenia (H)     Generalized anxiety disorder     Excessive or frequent menstruation     Intermittent asthma, uncomplicated     Umbilical hernia without obstruction and without gangrene     Bilateral inguinal hernia without obstruction or gangrene, recurrence not specified     Diastasis recti     Past Surgical History:   Procedure Laterality Date     ABDOMEN SURGERY      hernia repair open     BIOPSY      bone marrow     C/SECTION, LOW TRANSVERSE  ,      LAPAROSCOPIC HERNIORRHAPHY UMBILICAL N/A 3/31/2015    Procedure: LAPAROSCOPIC HERNIORRHAPHY UMBILICAL;  Surgeon: Bigg Mirza MD;  Location: [facility] OR     vaginal dellivery         Social History   Substance Use Topics     Smoking status: Former Smoker     Years: 1.00     Types: Cigarettes     Smokeless tobacco: Never Used     Alcohol use Yes      Comment: 4 drinks per week     Family History   Problem Relation Age of Onset     Cancer Maternal Grandfather      lung, smoker     HEART DISEASE Maternal Grandfather      CAD age 70's  age 81     C.A.D. Maternal Grandfather      81     Cerebrovascular Disease Maternal Grandfather      Diabetes Maternal Grandfather      Other Cancer Maternal Grandfather      Cerebrovascular Disease Paternal Grandfather      Multiple CVA     Breast Cancer Paternal Grandmother      Cancer Paternal Grandmother      Diabetes Father      Asthma Father      Coronary Artery Disease Other      Depression Sister      Anxiety Disorder Sister      Coronary Artery Disease Paternal Uncle      Heart Failure Paternal Uncle      Coronary  Artery Disease Paternal Uncle      Heart Failure Paternal Uncle      Hypertension No family hx of      Thyroid Disease No family hx of      Alcohol/Drug No family hx of      Cancer - colorectal No family hx of      Prostate Cancer No family hx of      Allergies No family hx of      Arthritis No family hx of      Alzheimer Disease No family hx of      Anesthesia Reaction No family hx of      Ovarian Cancer No family hx of      Mental Illness No family hx of      Osteoporosis No family hx of      Known Genetic Syndrome No family hx of      Obesity No family hx of      Unknown/Adopted No family hx of      Mental Illness No family hx of      Substance Abuse No family hx of      Genetic Disorder No family hx of      Hyperlipidemia No family hx of      Colon Cancer No family hx of          Current Outpatient Prescriptions   Medication Sig Dispense Refill     albuterol (PROAIR HFA/PROVENTIL HFA/VENTOLIN HFA) 108 (90 Base) MCG/ACT inhaler Inhale 2 puffs into the lungs every 4 hours as needed for shortness of breath / dyspnea or wheezing 1 Inhaler 1     filgrastim-sndz (ZARXIO) 480 MCG/0.8ML syringe Inject two to three times a week as instructed 12 Syringe 11     FLUoxetine (PROZAC) 20 MG capsule Take 1 tab po 1 week/month with cycle 30 capsule 1     norgestimate-ethinyl estradiol (ORTHO-CYCLEN, SPRINTEC) 0.25-35 MG-MCG per tablet Take 1 tablet by mouth daily 112 tablet 0     Multiple Vitamins-Minerals (MULTIVITAMIN ADULT PO)        Probiotic Product (PROBIOTIC ADVANCED PO)        UNABLE TO FIND MEDICATION NAME: hair skin nails       [DISCONTINUED] albuterol (PROAIR HFA/PROVENTIL HFA/VENTOLIN HFA) 108 (90 BASE) MCG/ACT Inhaler Inhale 2 puffs into the lungs every 4 hours as needed for shortness of breath / dyspnea or wheezing (Patient not taking: Reported on 11/9/2018) 1 Inhaler 1     Allergies   Allergen Reactions     No Known Drug Allergies      Recent Labs   Lab Test  02/14/17   1442 03/10/15   01/09/14   1346   05/24/11    "1037   LDL   --    --    --   72   --    --    HDL   --    --    --   57   --    --    TRIG   --    --    --   115   --    --    ALT  25   --    --    --    --   15   CR  0.63  0.83   < >   --    < >  0.58   GFRESTIMATED  >90  Non  GFR Calc    >60   < >   --    < >  >90   GFRESTBLACK  >90   GFR Calc    >60   < >   --    < >  >90   POTASSIUM  3.4  4.0   < >   --    --   4.0   TSH   --    --    --   1.84   --   1.42    < > = values in this interval not displayed.      BP Readings from Last 3 Encounters:   11/09/18 100/72   06/19/18 116/78   03/23/18 126/73    Wt Readings from Last 3 Encounters:   11/09/18 142 lb 12.8 oz (64.8 kg)   06/19/18 137 lb 6.4 oz (62.3 kg)   03/23/18 143 lb (64.9 kg)        ROS:  Constitutional, HEENT, cardiovascular, pulmonary, GI, , musculoskeletal, neuro, skin, endocrine and psych systems are negative, except as otherwise noted.    This document serves as a record of the services and decisions personally performed and made by Tawana Handley CNP. It was created on his/her behalf by Bel Pink, trained medical scribe. The creation of this document is based the provider's statements to the medical scribes.    Scribtricia Pink 11:23 AM, November 9, 2018  OBJECTIVE:     /72  Pulse 86  Temp 98.3  F (36.8  C) (Temporal)  Resp 16  Ht 5' 7.5\" (1.715 m)  Wt 142 lb 12.8 oz (64.8 kg)  LMP 11/02/2018 (Approximate)  SpO2 100%  Breastfeeding? No  BMI 22.04 kg/m2  Body mass index is 22.04 kg/(m^2).     GENERAL: healthy, alert and no distress  HENT: ear canals and TM's normal, nose and mouth without ulcers or lesions  NECK: no adenopathy, no asymmetry, masses, or scars and thyroid normal to palpation  RESP: lungs clear to auscultation - no rales, rhonchi or wheezes  CV: regular rate and rhythm, normal S1 S2, no S3 or S4, no murmur, click or rub, no peripheral edema and peripheral pulses strong  NEURO: Normal strength and tone, mentation intact and " speech normal  PSYCH: mentation appears normal, affect normal/bright    Diagnostic Test Results:  No results found for this or any previous visit (from the past 24 hour(s)).    ASSESSMENT/PLAN:       ICD-10-CM    1. Excessive or frequent menstruation N92.0 norgestimate-ethinyl estradiol (ORTHO-CYCLEN, SPRINTEC) 0.25-35 MG-MCG per tablet   2. Autoimmune neutropenia (H) D70.8    3. Generalized anxiety disorder F41.1    4. PMS (premenstrual syndrome) N94.3 FLUoxetine (PROZAC) 20 MG capsule   5. Intermittent asthma, uncomplicated J45.20 Asthma Action Plan (AAP)     albuterol (PROAIR HFA/PROVENTIL HFA/VENTOLIN HFA) 108 (90 Base) MCG/ACT inhaler   6. Screen for STD (sexually transmitted disease) Z11.3 Neisseria gonorrhoeae PCR     Chlamydia trachomatis PCR     HIV Antigen Antibody Combo     Discussed asthma, mood, contraception, and STD testing at length with patient today. Asthma action plan updated today. STD screening lab with one time HIV screening lab placed today; will notify with results.     Advised starting Sprintec for her excessive and frequent menstruation and Fluoxetine for her PMS symptoms; Rx provided. Reviewed directions, benefits, and side effects of medications with patient today. Has tubal ligation. Discussed use, and SA.     Patient reports that asthma is well controlled and medication is well tolerated with no reported side effects; plan to continue on current dose; Refill provided.    Follow up with PCP in 3 months for annual physical exam with routine fasting lab work or prn.      The information in this document, created by the medical scribe for me, accurately reflects the services I personally performed and the decisions made by me. I have reviewed and approved this document for accuracy prior to leaving the patient care area.  Tawana Handlye CNP  11:23 AM, 11/09/18    EMMA Langley CNP  [facility]

## 2018-11-09 ENCOUNTER — OFFICE VISIT (OUTPATIENT)
Dept: FAMILY MEDICINE | Facility: CLINIC | Age: 38
End: 2018-11-09
Payer: COMMERCIAL

## 2018-11-09 VITALS
TEMPERATURE: 98.3 F | DIASTOLIC BLOOD PRESSURE: 72 MMHG | OXYGEN SATURATION: 100 % | RESPIRATION RATE: 16 BRPM | SYSTOLIC BLOOD PRESSURE: 100 MMHG | WEIGHT: 142.8 LBS | HEIGHT: 68 IN | BODY MASS INDEX: 21.64 KG/M2 | HEART RATE: 86 BPM

## 2018-11-09 DIAGNOSIS — J45.20 INTERMITTENT ASTHMA, UNCOMPLICATED: ICD-10-CM

## 2018-11-09 DIAGNOSIS — N92.0 EXCESSIVE OR FREQUENT MENSTRUATION: Primary | ICD-10-CM

## 2018-11-09 DIAGNOSIS — N94.3 PMS (PREMENSTRUAL SYNDROME): ICD-10-CM

## 2018-11-09 DIAGNOSIS — Z11.3 SCREEN FOR STD (SEXUALLY TRANSMITTED DISEASE): ICD-10-CM

## 2018-11-09 DIAGNOSIS — D70.8 AUTOIMMUNE NEUTROPENIA (H): ICD-10-CM

## 2018-11-09 DIAGNOSIS — F41.1 GENERALIZED ANXIETY DISORDER: ICD-10-CM

## 2018-11-09 PROCEDURE — 87491 CHLMYD TRACH DNA AMP PROBE: CPT | Performed by: NURSE PRACTITIONER

## 2018-11-09 PROCEDURE — 99214 OFFICE O/P EST MOD 30 MIN: CPT | Performed by: NURSE PRACTITIONER

## 2018-11-09 PROCEDURE — 87389 HIV-1 AG W/HIV-1&-2 AB AG IA: CPT | Performed by: NURSE PRACTITIONER

## 2018-11-09 PROCEDURE — 36415 COLL VENOUS BLD VENIPUNCTURE: CPT | Performed by: NURSE PRACTITIONER

## 2018-11-09 PROCEDURE — 87591 N.GONORRHOEAE DNA AMP PROB: CPT | Performed by: NURSE PRACTITIONER

## 2018-11-09 RX ORDER — ALBUTEROL SULFATE 90 UG/1
2 AEROSOL, METERED RESPIRATORY (INHALATION) EVERY 4 HOURS PRN
Qty: 1 INHALER | Refills: 1 | Status: SHIPPED | OUTPATIENT
Start: 2018-11-09 | End: 2020-06-16

## 2018-11-09 RX ORDER — NORGESTIMATE AND ETHINYL ESTRADIOL 0.25-0.035
1 KIT ORAL DAILY
Qty: 112 TABLET | Refills: 0 | Status: SHIPPED | OUTPATIENT
Start: 2018-11-09 | End: 2019-01-30

## 2018-11-09 ASSESSMENT — PAIN SCALES - GENERAL: PAINLEVEL: NO PAIN (0)

## 2018-11-09 NOTE — MR AVS SNAPSHOT
After Visit Summary   11/9/2018    Alyssa Manzano    MRN: [id number]           Patient Information     Date Of Birth          1980        Visit Information        Provider Department      11/9/2018 11:00 AM Tawana Handley APRN CNP [facility]        Today's Diagnoses     Excessive or frequent menstruation    -  1    Autoimmune neutropenia (H)        Generalized anxiety disorder        PMS (premenstrual syndrome)        Intermittent asthma, uncomplicated        Screen for STD (sexually transmitted disease)           Follow-ups after your visit        Follow-up notes from your care team     Return in about 3 months (around 2/9/2019) for Physical Exam, Lab Work, re-check office visit.      Your next 10 appointments already scheduled     Feb 15, 2019  8:00 AM CST   PHYSICAL with EMMA Parr CNP   [facility]    [address] [place] [address], [address]  [facility] [id number] [phone]            Jun 20, 2019 11:45 AM CDT   LAB with LAB ONC [facility] ([facility])    [id number] [place] [id number] [phone]           Please do not eat 10-12 hours before your appointment if you are coming in fasting for labs on lipids, cholesterol, or glucose (sugar). This does not apply to pregnant women. Water, hot tea and black coffee (with nothing added) are okay. Do not drink other fluids, diet soda or chew gum.            Jun 20, 2019 12:30 PM CDT   Return Visit with Constance Nichole MD   [facility] ([facility])    [address] [place] [address] [phone]              Who to contact     If you have questions or need follow up information about today's clinic visit or your schedule please contact [facility] directly at [phone].  Normal or non-critical lab and imaging results will be communicated to you by MyChart, letter or  "phone within 4 business days after the clinic has received the results. If you do not hear from us within 7 days, please contact the clinic through [employer] or phone. If you have a critical or abnormal lab result, we will notify you by phone as soon as possible.  Submit refill requests through [employer] or call your pharmacy and they will forward the refill request to us. Please allow 3 business days for your refill to be completed.          Additional Information About Your Visit        [employer] Information     [employer] gives you secure access to your electronic health record. If you see a primary care provider, you can also send messages to your care team and make appointments. If you have questions, please call your primary care clinic.  If you do not have a primary care provider, please call [phone] and they will assist you.        Care EveryWhere ID     This is your Care EveryWhere ID. This could be used by other organizations to access your [facility] medical records  [id number]        Your Vitals Were     Pulse Temperature Respirations Height Last Period Pulse Oximetry    86 98.3  F (36.8  C) (Temporal) 16 5' 7.5\" (1.715 m) 11/02/2018 (Approximate) 100%    Breastfeeding? BMI (Body Mass Index)                No 22.04 kg/m2           Blood Pressure from Last 3 Encounters:   11/09/18 100/72   06/19/18 116/78   03/23/18 126/73    Weight from Last 3 Encounters:   11/09/18 142 lb 12.8 oz (64.8 kg)   06/19/18 137 lb 6.4 oz (62.3 kg)   03/23/18 143 lb (64.9 kg)              We Performed the Following     Asthma Action Plan (AAP)     Chlamydia trachomatis PCR     HIV Antigen Antibody Combo     Neisseria gonorrhoeae PCR          Today's Medication Changes          These changes are accurate as of 11/9/18  3:22 PM.  If you have any questions, ask your nurse or doctor.               Start taking these medicines.        Dose/Directions    FLUoxetine 20 MG capsule   Commonly known as:  PROzac   Used for:  PMS (premenstrual " syndrome)   Started by:  Tawana Handley APRN CNP        Take 1 tab po 1 week/month with cycle   Quantity:  30 capsule   Refills:  1       norgestimate-ethinyl estradiol 0.25-35 MG-MCG per tablet   Commonly known as:  ORTHO-CYCLEN, SPRINTEC   Used for:  Excessive or frequent menstruation   Started by:  Tawana Handley APRN CNP        Dose:  1 tablet   Take 1 tablet by mouth daily   Quantity:  112 tablet   Refills:  0            Where to get your medicines      These medications were sent to [id number] - [place], [place] - [address]  [address] [address]    Hours:  test Rx sent successfully 12/26/02  KR Phone:  [phone]     albuterol 108 (90 Base) MCG/ACT inhaler    FLUoxetine 20 MG capsule    norgestimate-ethinyl estradiol 0.25-35 MG-MCG per tablet                Primary Care Provider Office Phone # Fax #    EMMA Parr [phone] [address] [facility] [address] [facility] [address]        Equal Access to Services     [facility]: Hadii aad ku hadasho Soomaali, waaxda luqadaha, qaybta kaalmada adeegyada, waxsherin martin . So [facility] [phone].    ATENCIÓN: Si habla español, tiene a cook disposición servicios gratuitos de asistencia lingüística. Ll[facility] [phone].    We comply with applicable federal civil rights laws and [place] laws. We do not discriminate on the basis of race, color, national origin, age, disability, sex, sexual orientation, or gender identity.            Thank you!     Thank you for choosing [facility]  for your care. Our goal is always to provide you with excellent care. Hearing back from our patients is one way we can continue to improve our services. Please take a few minutes to complete the written survey that you may receive in the mail after your visit with us. Thank you!             Your Updated Medication List - Protect others around you: Learn how to safely use, store and throw away your  medicines at www.disposemymeds.org.          This list is accurate as of 11/9/18  3:22 PM.  Always use your most recent med list.                   Brand Name Dispense Instructions for use Diagnosis    albuterol 108 (90 Base) MCG/ACT inhaler    PROAIR HFA/PROVENTIL HFA/VENTOLIN HFA    1 Inhaler    Inhale 2 puffs into the lungs every 4 hours as needed for shortness of breath / dyspnea or wheezing    Intermittent asthma, uncomplicated       filgrastim-sndz 480 MCG/0.8ML syringe    ZARXIO    12 Syringe    Inject two to three times a week as instructed    Other neutropenia (H)       FLUoxetine 20 MG capsule    PROzac    30 capsule    Take 1 tab po 1 week/month with cycle    PMS (premenstrual syndrome)       MULTIVITAMIN ADULT PO           norgestimate-ethinyl estradiol 0.25-35 MG-MCG per tablet    ORTHO-CYCLEN, SPRINTEC    112 tablet    Take 1 tablet by mouth daily    Excessive or frequent menstruation       PROBIOTIC ADVANCED PO           UNABLE TO FIND      MEDICATION NAME: hair skin nails

## 2018-11-09 NOTE — LETTER
My Asthma Action Plan  Name: Alyssa Manzano   YOB: 1980  Date: 11/9/2018   My doctor: EMMA Langley CNP   My clinic: [facility]        My Control Medicine: None  My Rescue Medicine: Albuterol (Proair/Ventolin/Proventil) inhaler 2 puffs every 6hours as needed   My Asthma Severity: intermittent  Avoid your asthma triggers: upper respiratory infections  None            GREEN ZONE   Good Control    I feel good    No cough or wheeze    Can work, sleep and play without asthma symptoms       Take your asthma control medicine every day.     1. If exercise triggers your asthma, take your rescue medication    15 minutes before exercise or sports, and    During exercise if you have asthma symptoms  2. Spacer to use with inhaler: If you have a spacer, make sure to use it with your inhaler             YELLOW ZONE Getting Worse  I have ANY of these:    I do not feel good    Cough or wheeze    Chest feels tight    Wake up at night   1. Keep taking your Green Zone medications  2. Start taking your rescue medicine:    every 20 minutes for up to 1 hour. Then every 4 hours for 24-48 hours.  3. If you stay in the Yellow Zone for more than 12-24 hours, contact your doctor.  4. If you do not return to the Green Zone in 12-24 hours or you get worse, start taking your oral steroid medicine if prescribed by your provider.           RED ZONE Medical Alert - Get Help  I have ANY of these:    I feel awful    Medicine is not helping    Breathing getting harder    Trouble walking or talking    Nose opens wide to breathe       1. Take your rescue medicine NOW  2. If your provider has prescribed an oral steroid medicine, start taking it NOW  3. Call your doctor NOW  4. If you are still in the Red Zone after 20 minutes and you have not reached your doctor:    Take your rescue medicine again and    Call 911 or go to the emergency room right away    See your regular doctor within 2 weeks of an Emergency Room or  Urgent Care visit for follow-up treatment.          Annual Reminders:  Meet with Asthma Educator,  Flu Shot in the Fall, consider Pneumonia Vaccination for patients with asthma (aged 19 and older).    Pharmacy:    [facility] - [place], [place] - [address], [address]  [id number] - [place], [place] - [address]  [place] [address]                      Asthma Triggers  How To Control Things That Make Your Asthma Worse    Triggers are things that make your asthma worse.  Look at the list below to help you find your triggers and what you can do about them.  You can help prevent asthma flare-ups by staying away from your triggers.      Trigger                                                          What you can do   Cigarette Smoke  Tobacco smoke can make asthma worse. Do not allow smoking in your home, car or around you.  Be sure no one smokes at a child s day care or school.  If you smoke, ask your health care provider for ways to help you quit.  Ask family members to quit too.  Ask your health care provider for a referral to Quit Plan to help you quit smoking, or call 5-987-939-PLAN.     Colds, Flu, Bronchitis  These are common triggers of asthma. Wash your hands often.  Don t touch your eyes, nose or mouth.  Get a flu shot every year.     Dust Mites  These are tiny bugs that live in cloth or carpet. They are too small to see. Wash sheets and blankets in hot water every week.   Encase pillows and mattress in dust mite proof covers.  Avoid having carpet if you can. If you have carpet, vacuum weekly.   Use a dust mask and HEPA vacuum.   Pollen and Outdoor Mold  Some people are allergic to trees, grass, or weed pollen, or molds. Try to keep your windows closed.  Limit time out doors when pollen count is high.   Ask you health care provider about taking medicine during allergy season.     Animal Dander  Some people are allergic to skin  flakes, urine or saliva from pets with fur or feathers. Keep pets with fur or feathers out of your home.    If you can t keep the pet outdoors, then keep the pet out of your bedroom.  Keep the bedroom door closed.  Keep pets off cloth furniture and away from stuffed toys.     Mice, Rats, and Cockroaches  Some people are allergic to the waste from these pests.   Cover food and garbage.  Clean up spills and food crumbs.  Store grease in the refrigerator.   Keep food out of the bedroom.   Indoor Mold  This can be a trigger if your home has high moisture. Fix leaking faucets, pipes, or other sources of water.   Clean moldy surfaces.  Dehumidify basement if it is damp and smelly.   Smoke, Strong Odors, and Sprays  These can reduce air quality. Stay away from strong odors and sprays, such as perfume, powder, hair spray, paints, smoke incense, paint, cleaning products, candles and new carpet.   Exercise or Sports  Some people with asthma have this trigger. Be active!  Ask your doctor about taking medicine before sports or exercise to prevent symptoms.    Warm up for 5-10 minutes before and after sports or exercise.     Other Triggers of Asthma  Cold air:  Cover your nose and mouth with a scarf.  Sometimes laughing or crying can be a trigger.  Some medicines and food can trigger asthma.

## 2018-11-10 ASSESSMENT — ASTHMA QUESTIONNAIRES: ACT_TOTALSCORE: 25

## 2018-11-11 LAB
C TRACH DNA SPEC QL NAA+PROBE: NEGATIVE
N GONORRHOEA DNA SPEC QL NAA+PROBE: NEGATIVE
SPECIMEN SOURCE: NORMAL
SPECIMEN SOURCE: NORMAL

## 2018-11-12 LAB — HIV 1+2 AB+HIV1 P24 AG SERPL QL IA: NONREACTIVE

## 2019-01-30 DIAGNOSIS — N92.0 EXCESSIVE OR FREQUENT MENSTRUATION: ICD-10-CM

## 2019-01-30 RX ORDER — NORGESTIMATE AND ETHINYL ESTRADIOL 0.25-0.035
KIT ORAL
Qty: 28 TABLET | Refills: 0 | Status: SHIPPED | OUTPATIENT
Start: 2019-01-30 | End: 2019-02-20

## 2019-01-30 NOTE — TELEPHONE ENCOUNTER
"Requested Prescriptions   Pending Prescriptions Disp Refills     MONO-LINYAH 0.25-35 MG-MCG tablet [Pharmacy Med Name: MONO-LINYAH 0.25-35MG-MCG TABS] 112 tablet 0     Sig: TAKE ONE TABLET BY MOUTH ONCE DAILY    Contraceptives Protocol Passed - 1/30/2019 11:47 AM       Passed - Patient is not a current smoker if age is 35 or older       Passed - Recent (12 mo) or future (30 days) visit within the authorizing provider's specialty    Patient had office visit in the last 12 months or has a visit in the next 30 days with authorizing provider or within the authorizing provider's specialty.  See \"Patient Info\" tab in inbasket, or \"Choose Columns\" in Meds & Orders section of the refill encounter.             Passed - Medication is active on med list       Passed - No active pregnancy on record       Passed - No positive pregnancy test in past 12 months        norgestimate-ethinyl estradiol (ORTHO-CYCLEN, SPRINTEC) 0.25-35 MG-MCG per tablet  Medication is being filled for 1 time refill only due to:  Patient needs to be seen because due for physical and medication follow up with possible lab work.   Next 5 appointments (look out 90 days)    Feb 22, 2019  8:00 AM CST  PHYSICAL with EMMA Parr [facility] ([facility] [address], [address]  [facility] [address]  [phone]      Roberta Clayton, RN, BSN       "

## 2019-03-07 ENCOUNTER — TELEPHONE (OUTPATIENT)
Dept: ONCOLOGY | Facility: CLINIC | Age: 39
End: 2019-03-07

## 2019-03-07 NOTE — TELEPHONE ENCOUNTER
I left a message and mailed a letter appt date/time-cap-03/07/2019,. Provider not in clinic I rescheduled Dr Nichole's appointment from 06/20/2019 and moved to 06/13/2019

## 2019-03-12 DIAGNOSIS — N92.0 EXCESSIVE OR FREQUENT MENSTRUATION: ICD-10-CM

## 2019-03-12 RX ORDER — NORGESTIMATE AND ETHINYL ESTRADIOL 0.25-0.035
KIT ORAL
Qty: 28 TABLET | Refills: 0 | OUTPATIENT
Start: 2019-03-12

## 2019-03-12 NOTE — TELEPHONE ENCOUNTER
BCP  Routing refill request to provider for review/approval because:  Genevieve given x1 and patient did not follow up  Patient needs to be seen because:  SEBASTIAN Eubanks, RN, BSN

## 2019-03-25 ENCOUNTER — OFFICE VISIT (OUTPATIENT)
Dept: FAMILY MEDICINE | Facility: CLINIC | Age: 39
End: 2019-03-25
Payer: COMMERCIAL

## 2019-03-25 VITALS
WEIGHT: 140.8 LBS | DIASTOLIC BLOOD PRESSURE: 58 MMHG | SYSTOLIC BLOOD PRESSURE: 100 MMHG | OXYGEN SATURATION: 100 % | TEMPERATURE: 98.8 F | RESPIRATION RATE: 16 BRPM | HEART RATE: 94 BPM | HEIGHT: 68 IN | BODY MASS INDEX: 21.34 KG/M2

## 2019-03-25 DIAGNOSIS — Z30.41 ENCOUNTER FOR SURVEILLANCE OF CONTRACEPTIVE PILLS: Primary | ICD-10-CM

## 2019-03-25 DIAGNOSIS — F41.1 GENERALIZED ANXIETY DISORDER: ICD-10-CM

## 2019-03-25 PROCEDURE — 99213 OFFICE O/P EST LOW 20 MIN: CPT | Performed by: NURSE PRACTITIONER

## 2019-03-25 ASSESSMENT — ANXIETY QUESTIONNAIRES
4. TROUBLE RELAXING: NOT AT ALL
GAD7 TOTAL SCORE: 3
5. BEING SO RESTLESS THAT IT IS HARD TO SIT STILL: NOT AT ALL
6. BECOMING EASILY ANNOYED OR IRRITABLE: SEVERAL DAYS
GAD7 TOTAL SCORE: 3
1. FEELING NERVOUS, ANXIOUS, OR ON EDGE: SEVERAL DAYS
3. WORRYING TOO MUCH ABOUT DIFFERENT THINGS: SEVERAL DAYS
7. FEELING AFRAID AS IF SOMETHING AWFUL MIGHT HAPPEN: NOT AT ALL
GAD7 TOTAL SCORE: 3
2. NOT BEING ABLE TO STOP OR CONTROL WORRYING: NOT AT ALL
7. FEELING AFRAID AS IF SOMETHING AWFUL MIGHT HAPPEN: NOT AT ALL

## 2019-03-25 ASSESSMENT — MIFFLIN-ST. JEOR: SCORE: 1364.22

## 2019-03-25 ASSESSMENT — PAIN SCALES - GENERAL: PAINLEVEL: NO PAIN (0)

## 2019-03-25 ASSESSMENT — PATIENT HEALTH QUESTIONNAIRE - PHQ9
10. IF YOU CHECKED OFF ANY PROBLEMS, HOW DIFFICULT HAVE THESE PROBLEMS MADE IT FOR YOU TO DO YOUR WORK, TAKE CARE OF THINGS AT HOME, OR GET ALONG WITH OTHER PEOPLE: NOT DIFFICULT AT ALL
SUM OF ALL RESPONSES TO PHQ QUESTIONS 1-9: 0
SUM OF ALL RESPONSES TO PHQ QUESTIONS 1-9: 0

## 2019-03-26 ASSESSMENT — ASTHMA QUESTIONNAIRES: ACT_TOTALSCORE: 25

## 2019-03-26 ASSESSMENT — ANXIETY QUESTIONNAIRES: GAD7 TOTAL SCORE: 3

## 2019-03-26 ASSESSMENT — PATIENT HEALTH QUESTIONNAIRE - PHQ9: SUM OF ALL RESPONSES TO PHQ QUESTIONS 1-9: 0

## 2019-05-29 DIAGNOSIS — D70.8 OTHER NEUTROPENIA (H): ICD-10-CM

## 2019-06-13 ENCOUNTER — ONCOLOGY VISIT (OUTPATIENT)
Dept: ONCOLOGY | Facility: CLINIC | Age: 39
End: 2019-06-13
Payer: COMMERCIAL

## 2019-06-13 VITALS
WEIGHT: 141.13 LBS | DIASTOLIC BLOOD PRESSURE: 69 MMHG | SYSTOLIC BLOOD PRESSURE: 105 MMHG | OXYGEN SATURATION: 99 % | HEART RATE: 66 BPM | HEIGHT: 68 IN | RESPIRATION RATE: 16 BRPM | TEMPERATURE: 98.9 F | BODY MASS INDEX: 21.39 KG/M2

## 2019-06-13 DIAGNOSIS — R21 RASH: Primary | ICD-10-CM

## 2019-06-13 DIAGNOSIS — D70.8 AUTOIMMUNE NEUTROPENIA (H): ICD-10-CM

## 2019-06-13 LAB
BASOPHILS # BLD AUTO: 0 10E9/L (ref 0–0.2)
BASOPHILS NFR BLD AUTO: 0.5 %
DIFFERENTIAL METHOD BLD: ABNORMAL
EOSINOPHIL # BLD AUTO: 0 10E9/L (ref 0–0.7)
EOSINOPHIL NFR BLD AUTO: 0.7 %
ERYTHROCYTE [DISTWIDTH] IN BLOOD BY AUTOMATED COUNT: 15.1 % (ref 10–15)
HCT VFR BLD AUTO: 37.3 % (ref 35–47)
HGB BLD-MCNC: 12.4 G/DL (ref 11.7–15.7)
IMM GRANULOCYTES # BLD: 0 10E9/L (ref 0–0.4)
IMM GRANULOCYTES NFR BLD: 0.2 %
LYMPHOCYTES # BLD AUTO: 1.2 10E9/L (ref 0.8–5.3)
LYMPHOCYTES NFR BLD AUTO: 22.2 %
MCH RBC QN AUTO: 30.1 PG (ref 26.5–33)
MCHC RBC AUTO-ENTMCNC: 33.2 G/DL (ref 31.5–36.5)
MCV RBC AUTO: 91 FL (ref 78–100)
MONOCYTES # BLD AUTO: 0.3 10E9/L (ref 0–1.3)
MONOCYTES NFR BLD AUTO: 5.2 %
NEUTROPHILS # BLD AUTO: 3.9 10E9/L (ref 1.6–8.3)
NEUTROPHILS NFR BLD AUTO: 71.2 %
PLATELET # BLD AUTO: 240 10E9/L (ref 150–450)
RBC # BLD AUTO: 4.12 10E12/L (ref 3.8–5.2)
WBC # BLD AUTO: 5.5 10E9/L (ref 4–11)

## 2019-06-13 PROCEDURE — 99214 OFFICE O/P EST MOD 30 MIN: CPT | Performed by: INTERNAL MEDICINE

## 2019-06-13 PROCEDURE — 36415 COLL VENOUS BLD VENIPUNCTURE: CPT | Performed by: INTERNAL MEDICINE

## 2019-06-13 PROCEDURE — 85025 COMPLETE CBC W/AUTO DIFF WBC: CPT | Performed by: INTERNAL MEDICINE

## 2019-06-13 ASSESSMENT — PAIN SCALES - GENERAL: PAINLEVEL: NO PAIN (0)

## 2019-06-13 ASSESSMENT — MIFFLIN-ST. JEOR: SCORE: 1366.02

## 2019-06-13 NOTE — LETTER
6/13/2019         RE: Alyssa Manzano  [address] [facility] [address]        Dear Colleague,    Thank you for referring your patient, Alyssa Manzano, to the [facility]. Please see a copy of my visit note below.    Hematology Follow-up Visit:  Jun 13, 2019      Referring Provider: Tawana Handley NP  OB/GYN: Dr. Jackson    Diagnosis: Autoimmune Neutropenia  -    Hematologic History:   Neutropenia dates back at least to March 2007 when her ANC measured 1.2. Hemoglobin and platelet count were normal. At that time, she saw Dr. Abraham Ybarra for a hematology consultation, had an unremarkable peripheral blood smear, and was felt to have benign neutropenia. No treatment recommendations were made at that time.   Since then, she was noted to have severe neutropenia on several occasions. Her hematologic work-up for neutropenia was essentially negative with complete metabolic panel normal. Vitamin B12, folic acid, TSH, Hep B panel, Hep C, and HIV antibody unremarkable. CAT negative. Immunoglobulins revealed low normal IgG level at 690 mg/dL (normal 695-1620 mg/dL). IgA and IgM were normal. RA factor slightly elevated at 20 IU/mL. Peripheral blood smear revealed slight normochromic normocytic anemia and marked leukopenia due to neutropenia. Spleen was mildly enlarged on an US and rheumatoid factor was positive and she was seen by Dr. Solis but he did not feel that she had Felty's syndrome or a clinically significant connective tissue disorder.     We decided to proceed with a bone marrow biopsy for further evaluation. BMBX on 6/8/2011 showed:    Marrow cellularity of 50-60% with trilineage hematopoietic maturation  Left-shifted but complete granulocytic maturation  No increase in blasts; no morphologic evidence of dysplasia    Flow Cytometry showed:  Polytypic B lymphocytes  No aberrant immunophenotype on T lymphocytes  No increase in blasts  Cytogenetics showed:  46,XX[20]    No clonal  chromosomal abnormality was detected among the 20 metaphase cells analyzed. Thus, no cytogenetic evidence of a malignant process was found.    She had antigranulocyte antibodies detected in her blood.   She has two daughters, the youngest is 3 years old and with both of her pregnancies her neutropenia has responded to PO prednisone 20 mg orally daily in the last month before delivery. She has three children at home.    Interval History:  Alyssa is a 38 year old female diagnosed with autoimmune neutropenia present today for follow-up.   She has remained on  Neupogen 480 mcg twice a week prophylactically. Her injections have been uneventful. Last injection of Neupogen was on 6/10 and today (6/13) anc is 3.9. She usually injects Neupogen at home on Mondays and Thursdays. If she misses doses she notes new mouth sores. She has not had any infections in the last year, except for mouth sores when she does not adhere to twice a week schedule. In the last 6 months she has developed erythematous pinpoint confluent macular rashes in her antecubital areas b/l, behind her knees, and in her armpits, which wax and wane. Currently, she has a mild rash in b/l antecubital areas only. This rash was present in the winter too intermittently. She also reports she has had some chills and occasional night sweats in the past couple of months. She has had no fevers and no s/o infection- no cough, diarrhea, dysuria.  She was seen by rheumatologist Dr. Solis in the past (about 5 years ago per patient) but he did not feel that she had Felty's syndrome or a clinically significant connective tissue disorder.    In addition, a complete 12 point  review of systems is negative.      Past medical, social, surgical, and family histories reviewed.    She has no family history of hematologic disorders or autoimmune disease.    Allergies:  Allergies as of 01/24/2013 - reviewed 01/24/2013    Allergen  Reaction  Noted       No known drug allergies     "04/25/2011          Current Medications:  Current Outpatient Medications   Medication     filgrastim-sndz (ZARXIO) 480 MCG/0.8ML syringe     Multiple Vitamins-Minerals (MULTIVITAMIN ADULT PO)     norethindrone-ethinyl estradiol (ORTHO-NOVUM 1-35 TAB,NORTREL 1-35 TAB) 1-35 MG-MCG tablet     Probiotic Product (PROBIOTIC ADVANCED PO)     albuterol (PROAIR HFA/PROVENTIL HFA/VENTOLIN HFA) 108 (90 Base) MCG/ACT inhaler     UNABLE TO FIND     No current facility-administered medications for this visit.          Physical Exam:    /69 (BP Location: Right arm)   Pulse 66   Temp 98.9  F (37.2  C) (Oral)   Resp 16   Ht 1.715 m (5' 7.52\")   Wt 64 kg (141 lb 2 oz)   SpO2 99%   BMI 21.76 kg/m         GENERAL APPEARANCE: Healthy, alert and in no acute distress.  HEENT: OP: no lesions  Neck: No asymmetry or masses  Lymphatics: No cervical, supraclavicular or axillary lymphadenopathy bilaterally  Cardiac: S1-S2 regular no murmur  Respiratory: Clear to auscultation bilaterally  GI: + bowel sounds, soft nontender nondistended and no organomegaly or masses  MUSCULOSKELETAL: Extremities without gross deformities noted. No edema b/l LE  Skin: Slightly erythematous pinpoint rash over bilateral antecubital areas.  NEURO: Alert and oriented x 3.  PSYCHIATRIC: Mentation and affect appear normal.    Laboratory Studies:  Orders Only on 06/13/2019   Component Date Value Ref Range Status     WBC 06/13/2019 5.5  4.0 - 11.0 10e9/L Final     RBC Count 06/13/2019 4.12  3.8 - 5.2 10e12/L Final     Hemoglobin 06/13/2019 12.4  11.7 - 15.7 g/dL Final     Hematocrit 06/13/2019 37.3  35.0 - 47.0 % Final     MCV 06/13/2019 91  78 - 100 fl Final     MCH 06/13/2019 30.1  26.5 - 33.0 pg Final     MCHC 06/13/2019 33.2  31.5 - 36.5 g/dL Final     RDW 06/13/2019 15.1* 10.0 - 15.0 % Final     Platelet Count 06/13/2019 240  150 - 450 10e9/L Final     Diff Method 06/13/2019 Automated Method   Final     % Neutrophils 06/13/2019 71.2  % Final     % " Lymphocytes 06/13/2019 22.2  % Final     % Monocytes 06/13/2019 5.2  % Final     % Eosinophils 06/13/2019 0.7  % Final     % Basophils 06/13/2019 0.5  % Final     % Immature Granulocytes 06/13/2019 0.2  % Final     Absolute Neutrophil 06/13/2019 3.9  1.6 - 8.3 10e9/L Final     Absolute Lymphocytes 06/13/2019 1.2  0.8 - 5.3 10e9/L Final     Absolute Monocytes 06/13/2019 0.3  0.0 - 1.3 10e9/L Final     Absolute Eosinophils 06/13/2019 0.0  0.0 - 0.7 10e9/L Final     Absolute Basophils 06/13/2019 0.0  0.0 - 0.2 10e9/L Final     Abs Immature Granulocytes 06/13/2019 0.0  0 - 0.4 10e9/L Final       ASSESSMENT/PLAN:  Alyssa is a radha 38 year old woman with 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  1. Neutropenia, autoimmune, with presence of antigranulocyte antibodies.   ANC normal on Neupogen  480mcg twice a week. Rx renewed for one year. Goal micaela ANC of >1.0 where usually no serious infections occur and oral aphthae disappear. She will be coming in every 3-6 months to check her micaela ANC and I will plan to see her back in one year with cbcd.  She was reminded to continue with annual influenza vaccinations in the fall.  2. Rash - referral to dermatology provided.   3. Chills, occasional night sweats- etiology unclear, as no signs or symptoms of infection. Recommended that she f/up with her PCP for TSH check, ESR, etc. She is agreeable. Consider revisiting with rheumatology as well if these symptoms persist.  At the end of the visit the patient verbalized understanding, denied any further questions, and concurred with the plan of care.         Again, thank you for allowing me to participate in the care of your patient.        Sincerely,        Constance Nichole MD, MD

## 2019-06-13 NOTE — PROGRESS NOTES
Hematology Follow-up Visit:  Jun 13, 2019      Referring Provider: Tawana Handley NP  OB/GYN: Dr. Jackson    Diagnosis: Autoimmune Neutropenia  -    Hematologic History:   Neutropenia dates back at least to March 2007 when her ANC measured 1.2. Hemoglobin and platelet count were normal. At that time, she saw Dr. Abraham Ybarra for a hematology consultation, had an unremarkable peripheral blood smear, and was felt to have benign neutropenia. No treatment recommendations were made at that time.   Since then, she was noted to have severe neutropenia on several occasions. Her hematologic work-up for neutropenia was essentially negative with complete metabolic panel normal. Vitamin B12, folic acid, TSH, Hep B panel, Hep C, and HIV antibody unremarkable. CAT negative. Immunoglobulins revealed low normal IgG level at 690 mg/dL (normal 695-1620 mg/dL). IgA and IgM were normal. RA factor slightly elevated at 20 IU/mL. Peripheral blood smear revealed slight normochromic normocytic anemia and marked leukopenia due to neutropenia. Spleen was mildly enlarged on an US and rheumatoid factor was positive and she was seen by Dr. Solis but he did not feel that she had Felty's syndrome or a clinically significant connective tissue disorder.     We decided to proceed with a bone marrow biopsy for further evaluation. BMBX on 6/8/2011 showed:    Marrow cellularity of 50-60% with trilineage hematopoietic maturation  Left-shifted but complete granulocytic maturation  No increase in blasts; no morphologic evidence of dysplasia    Flow Cytometry showed:  Polytypic B lymphocytes  No aberrant immunophenotype on T lymphocytes  No increase in blasts  Cytogenetics showed:  46,XX[20]    No clonal chromosomal abnormality was detected among the 20 metaphase cells analyzed. Thus, no cytogenetic evidence of a malignant process was found.    She had antigranulocyte antibodies detected in her blood.   She has two daughters, the youngest is 3 years old and  with both of her pregnancies her neutropenia has responded to PO prednisone 20 mg orally daily in the last month before delivery. She has three children at home.    Interval History:  Alyssa is a 38 year old female diagnosed with autoimmune neutropenia present today for follow-up.   She has remained on  Neupogen 480 mcg twice a week prophylactically. Her injections have been uneventful. Last injection of Neupogen was on 6/10 and today (6/13) anc is 3.9. She usually injects Neupogen at home on Mondays and Thursdays. If she misses doses she notes new mouth sores. She has not had any infections in the last year, except for mouth sores when she does not adhere to twice a week schedule. In the last 6 months she has developed erythematous pinpoint confluent macular rashes in her antecubital areas b/l, behind her knees, and in her armpits, which wax and wane. Currently, she has a mild rash in b/l antecubital areas only. This rash was present in the winter too intermittently. She also reports she has had some chills and occasional night sweats in the past couple of months. She has had no fevers and no s/o infection- no cough, diarrhea, dysuria.  She was seen by rheumatologist Dr. Solis in the past (about 5 years ago per patient) but he did not feel that she had Felty's syndrome or a clinically significant connective tissue disorder.    In addition, a complete 12 point  review of systems is negative.      Past medical, social, surgical, and family histories reviewed.    She has no family history of hematologic disorders or autoimmune disease.    Allergies:  Allergies as of 01/24/2013 - reviewed 01/24/2013    Allergen  Reaction  Noted       No known drug allergies    04/25/2011          Current Medications:  Current Outpatient Medications   Medication     filgrastim-sndz (ZARXIO) 480 MCG/0.8ML syringe     Multiple Vitamins-Minerals (MULTIVITAMIN ADULT PO)     norethindrone-ethinyl estradiol (ORTHO-NOVUM 1-35 TAB,NORTREL  "1-35 TAB) 1-35 MG-MCG tablet     Probiotic Product (PROBIOTIC ADVANCED PO)     albuterol (PROAIR HFA/PROVENTIL HFA/VENTOLIN HFA) 108 (90 Base) MCG/ACT inhaler     UNABLE TO FIND     No current facility-administered medications for this visit.          Physical Exam:    /69 (BP Location: Right arm)   Pulse 66   Temp 98.9  F (37.2  C) (Oral)   Resp 16   Ht 1.715 m (5' 7.52\")   Wt 64 kg (141 lb 2 oz)   SpO2 99%   BMI 21.76 kg/m        GENERAL APPEARANCE: Healthy, alert and in no acute distress.  HEENT: OP: no lesions  Neck: No asymmetry or masses  Lymphatics: No cervical, supraclavicular or axillary lymphadenopathy bilaterally  Cardiac: S1-S2 regular no murmur  Respiratory: Clear to auscultation bilaterally  GI: + bowel sounds, soft nontender nondistended and no organomegaly or masses  MUSCULOSKELETAL: Extremities without gross deformities noted. No edema b/l LE  Skin: Slightly erythematous pinpoint rash over bilateral antecubital areas.  NEURO: Alert and oriented x 3.  PSYCHIATRIC: Mentation and affect appear normal.    Laboratory Studies:  Orders Only on 06/13/2019   Component Date Value Ref Range Status     WBC 06/13/2019 5.5  4.0 - 11.0 10e9/L Final     RBC Count 06/13/2019 4.12  3.8 - 5.2 10e12/L Final     Hemoglobin 06/13/2019 12.4  11.7 - 15.7 g/dL Final     Hematocrit 06/13/2019 37.3  35.0 - 47.0 % Final     MCV 06/13/2019 91  78 - 100 fl Final     MCH 06/13/2019 30.1  26.5 - 33.0 pg Final     MCHC 06/13/2019 33.2  31.5 - 36.5 g/dL Final     RDW 06/13/2019 15.1* 10.0 - 15.0 % Final     Platelet Count 06/13/2019 240  150 - 450 10e9/L Final     Diff Method 06/13/2019 Automated Method   Final     % Neutrophils 06/13/2019 71.2  % Final     % Lymphocytes 06/13/2019 22.2  % Final     % Monocytes 06/13/2019 5.2  % Final     % Eosinophils 06/13/2019 0.7  % Final     % Basophils 06/13/2019 0.5  % Final     % Immature Granulocytes 06/13/2019 0.2  % Final     Absolute Neutrophil 06/13/2019 3.9  1.6 - 8.3 " 10e9/L Final     Absolute Lymphocytes 06/13/2019 1.2  0.8 - 5.3 10e9/L Final     Absolute Monocytes 06/13/2019 0.3  0.0 - 1.3 10e9/L Final     Absolute Eosinophils 06/13/2019 0.0  0.0 - 0.7 10e9/L Final     Absolute Basophils 06/13/2019 0.0  0.0 - 0.2 10e9/L Final     Abs Immature Granulocytes 06/13/2019 0.0  0 - 0.4 10e9/L Final       ASSESSMENT/PLAN:  Alyssa is a radha 38 year old woman with 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  1. Neutropenia, autoimmune, with presence of antigranulocyte antibodies.   ANC normal on Neupogen  480mcg twice a week. Rx renewed for one year. Goal micaela ANC of >1.0 where usually no serious infections occur and oral aphthae disappear. She will be coming in every 3-6 months to check her micaela ANC and I will plan to see her back in one year with cbcd.  She was reminded to continue with annual influenza vaccinations in the fall.  2. Rash - referral to dermatology provided.   3. Chills, occasional night sweats- etiology unclear, as no signs or symptoms of infection. Recommended that she f/up with her PCP for TSH check, ESR, etc. She is agreeable. Consider revisiting with rheumatology as well if these symptoms persist.  At the end of the visit the patient verbalized understanding, denied any further questions, and concurred with the plan of care.     Addendum: She was seen by GC.  Family History: (Please see scanned pedigree for detailed family history information)  Maternal:    Her mother is 69 years old with no known history of cancer.    Her maternal uncle was diagnosed with liver cancer at age 65 and passed away at age 65. He had no known history of exposures or smoking.     Her maternal grandfather was diagnosed with lung cancer at age 80 and passed away at age 81. He had a distant history of smoking and possible work-related exposures.  Paternal:    Her father was diagnosed with kidney cancer at age 70 and passed away recently at age 71. He also had a history of colon polyps.  He may have had exposures in the [employer].    Her paternal uncle was diagnosed with lung cancer and passed away in his mid 60s. He had a distant history of smoking. He also had a history of colon polyps.     Her paternal grandmother was diagnosed with breast cancer in her 40s and had a lumpectomy. She was then diagnosed with breast cancer again at age 90. She also had a lump in her leg at age 90 which was reported to be a cancer separate from her breast cancer.   CustomNext-Cancer panel (a combination of the CancerNext panel + RenalNext panel + AXIN2, MSH3, NTHL1) offered by [employer] was drawn and is pending.

## 2019-06-13 NOTE — NURSING NOTE
"Oncology Rooming Note    June 13, 2019 3:03 PM   Alyssa Manzano is a 38 year old female who presents for:    Chief Complaint   Patient presents with     Oncology Clinic Visit     1 year follow up     Initial Vitals: /69 (BP Location: Right arm)   Pulse 66   Temp 98.9  F (37.2  C) (Oral)   Resp 16   Ht 1.715 m (5' 7.52\")   Wt 64 kg (141 lb 2 oz)   SpO2 99%   BMI 21.76 kg/m   Estimated body mass index is 21.76 kg/m  as calculated from the following:    Height as of this encounter: 1.715 m (5' 7.52\").    Weight as of this encounter: 64 kg (141 lb 2 oz). Body surface area is 1.75 meters squared.  No Pain (0) Comment: Data Unavailable   No LMP recorded.  Allergies reviewed: Yes  Medications reviewed: Yes    Medications: Medication refills not needed today.  Pharmacy name entered into [facility]:    [facility] - [place], [place] - [address], [address]  [id number] - [place], [place] - [address]  [facility] [place] [place] - [address] L[address]            "

## 2019-06-14 ENCOUNTER — TELEPHONE (OUTPATIENT)
Dept: DERMATOLOGY | Facility: CLINIC | Age: 39
End: 2019-06-14

## 2019-06-14 NOTE — TELEPHONE ENCOUNTER
Talked with patient, patient stated the rash is not there currently and she would give us a call when it shows up, this cma gave her direct number, patient would need to be seen asap for the rash due to referral from oncology.    Amorranthony Gunn CMA

## 2019-06-18 ENCOUNTER — TELEPHONE (OUTPATIENT)
Dept: ONCOLOGY | Facility: CLINIC | Age: 39
End: 2019-06-18

## 2019-06-18 NOTE — TELEPHONE ENCOUNTER
Per order from Dr Nichole this patient needs a lab appointment in 3-6 months. This patient will call to schedule her own lab appointment.

## 2019-07-17 ENCOUNTER — TELEPHONE (OUTPATIENT)
Dept: FAMILY MEDICINE | Facility: CLINIC | Age: 39
End: 2019-07-17

## 2019-07-17 ENCOUNTER — OFFICE VISIT (OUTPATIENT)
Dept: FAMILY MEDICINE | Facility: CLINIC | Age: 39
End: 2019-07-17
Payer: COMMERCIAL

## 2019-07-17 VITALS
WEIGHT: 144 LBS | HEIGHT: 68 IN | HEART RATE: 84 BPM | BODY MASS INDEX: 21.82 KG/M2 | TEMPERATURE: 98.2 F | SYSTOLIC BLOOD PRESSURE: 100 MMHG | OXYGEN SATURATION: 100 % | DIASTOLIC BLOOD PRESSURE: 62 MMHG

## 2019-07-17 DIAGNOSIS — R30.0 DYSURIA: Primary | ICD-10-CM

## 2019-07-17 DIAGNOSIS — R53.83 FATIGUE, UNSPECIFIED TYPE: ICD-10-CM

## 2019-07-17 DIAGNOSIS — R61 NIGHT SWEATS: ICD-10-CM

## 2019-07-17 DIAGNOSIS — R30.0 DYSURIA: ICD-10-CM

## 2019-07-17 LAB
ALBUMIN UR-MCNC: NEGATIVE MG/DL
APPEARANCE UR: CLEAR
BACTERIA #/AREA URNS HPF: ABNORMAL /HPF
BILIRUB UR QL STRIP: NEGATIVE
COLOR UR AUTO: YELLOW
GLUCOSE UR STRIP-MCNC: NEGATIVE MG/DL
HGB UR QL STRIP: ABNORMAL
KETONES UR STRIP-MCNC: NEGATIVE MG/DL
LEUKOCYTE ESTERASE UR QL STRIP: NEGATIVE
NITRATE UR QL: NEGATIVE
PH UR STRIP: 7.5 PH (ref 5–7)
RBC #/AREA URNS AUTO: ABNORMAL /HPF
SOURCE: ABNORMAL
SP GR UR STRIP: 1.02 (ref 1–1.03)
SPECIMEN SOURCE: NORMAL
UROBILINOGEN UR STRIP-ACNC: 0.2 EU/DL (ref 0.2–1)
WBC #/AREA URNS AUTO: ABNORMAL /HPF
WET PREP SPEC: NORMAL

## 2019-07-17 PROCEDURE — 87591 N.GONORRHOEAE DNA AMP PROB: CPT | Performed by: NURSE PRACTITIONER

## 2019-07-17 PROCEDURE — 81001 URINALYSIS AUTO W/SCOPE: CPT | Performed by: NURSE PRACTITIONER

## 2019-07-17 PROCEDURE — 87491 CHLMYD TRACH DNA AMP PROBE: CPT | Performed by: NURSE PRACTITIONER

## 2019-07-17 PROCEDURE — 99214 OFFICE O/P EST MOD 30 MIN: CPT | Performed by: NURSE PRACTITIONER

## 2019-07-17 PROCEDURE — 87210 SMEAR WET MOUNT SALINE/INK: CPT | Performed by: NURSE PRACTITIONER

## 2019-07-17 RX ORDER — SULFAMETHOXAZOLE/TRIMETHOPRIM 800-160 MG
1 TABLET ORAL 2 TIMES DAILY
Qty: 6 TABLET | Refills: 0 | Status: SHIPPED | OUTPATIENT
Start: 2019-07-17 | End: 2019-10-31

## 2019-07-17 ASSESSMENT — PAIN SCALES - GENERAL: PAINLEVEL: MODERATE PAIN (4)

## 2019-07-17 ASSESSMENT — MIFFLIN-ST. JEOR: SCORE: 1379.06

## 2019-07-17 NOTE — TELEPHONE ENCOUNTER
Reason for Call: Request for an order or referral:Order     Order or referral being requested: order      Date needed: as soon as possible    Has the patient been seen by the PCP for this problem? Not Applicable    Additional comments: Please order lab order for urine sample. Patient possibly has a bladder infection. Has an appointment at [facility] in [place]. Will come in early to leave urine sample before the appointment.     Phone number Patient can be reached at:  Home number on file [phone] (home)    Best Time:  Anytime     Can we leave a detailed message on this number?  YES    Call taken on 7/17/2019 at 8:11 AM by Belkys Fox

## 2019-07-17 NOTE — PATIENT INSTRUCTIONS
I will call you with results of testing from today.    Ensure you are drinking plenty of fluid.  Take full course of antibiotics- Bactrim DS  twice daily for 3 days.  Urine culture pending, we will call you only if culture shows resistance and change of antibiotic is required or if no growth to stop antibiotics and to follow-up with your primary care provider.  May use over the counter AZO pain relief or Uristat (phenazopyridine) for urinary burning but do not use for 24 hours before future urine tests.  Drink plenty of fluids. Limit caffeine and alcohol as these are bladder irritants.  May take tylenol or ibuprofen as needed for discomfort.   If you develop any vomiting, high fevers or lower back pain, these can be signs of a kidney infection and you should be seen in urgent care or in the ER.  Prevention of future infections by drinking cranberry juice, urination after intercourse and wiping from front to back after using the toilet.    Please follow up with primary care provider if symptoms return, if you're not improving, worsening or new symptoms or for any adverse reactions to medications    Lori Nina, CNP

## 2019-07-17 NOTE — PROGRESS NOTES
"Subjective     Alyssa Manzano is a 38 year old female who presents to clinic today for the following health issues:    History of Present Illness        She eats 0-1 servings of fruits and vegetables daily.She consumes 0 sweetened beverage(s) daily.  She is taking medications regularly.     URINARY TRACT SYMPTOMS  Onset: 2 days      Description:   Painful urination (Dysuria): YES- burn sensation   Blood in urine (Hematuria): no   Delay in urine (Hesitency): no     Intensity: moderate    Progression of Symptoms:  worsening    Accompanying Signs & Symptoms:  Fever/chills: YES- \"at night I sweats \"  Flank pain no   Nausea and vomiting: no   Any vaginal symptoms: none  Abdominal/Pelvic Pain: YES- \"my abdomen seem swollen to me\"     History:   History of frequent UTI's: no   History of kidney stones: no   Sexually Active: YES- one partner   Possibility of pregnancy: No    Precipitating factors:       Therapies Tried and outcome: increased fluids     Additional HPI:   - She is on continuous OCPs for severe abnormal uterine bleeding for about a year now  - Has a period about 3-4 x yearly   - She had tubal ligation 2014  - denies vaginal discharge, odor, itching or burning   - \"feels like her stomach is bigger than normal\"  -  feels overly tired and fatigued  - having bad night-time sweats  - patient states she does have a new partner since last STD Check in November 2018        Patient Active Problem List   Diagnosis     Chronic rhinitis     Abnormal Pap smear, low grade squamous intraepithelial lesion (LGSIL)     CARDIOVASCULAR SCREENING; LDL GOAL LESS THAN 160     Urticaria     Autoimmune neutropenia (H)     Generalized anxiety disorder     Excessive or frequent menstruation     Intermittent asthma, uncomplicated     Umbilical hernia without obstruction and without gangrene     Bilateral inguinal hernia without obstruction or gangrene, recurrence not specified     Diastasis recti     Past Surgical History:   Procedure " Laterality Date     ABDOMEN SURGERY  2014    hernia repair open     BIOPSY      bone marrow     C/SECTION, LOW TRANSVERSE  ,      LAPAROSCOPIC HERNIORRHAPHY UMBILICAL N/A 3/31/2015    Procedure: LAPAROSCOPIC HERNIORRHAPHY UMBILICAL;  Surgeon: Bigg Mirza MD;  Location: [facility] OR     vaginal dellivery         Social History     Tobacco Use     Smoking status: Former Smoker     Years: 1.00     Types: Cigarettes     Smokeless tobacco: Never Used   Substance Use Topics     Alcohol use: Yes     Comment: 4 drinks per week     Family History   Problem Relation Age of Onset     Cancer Maternal Grandfather         lung, smoker     Heart Disease Maternal Grandfather         CAD age 70's  age 81     C.A.D. Maternal Grandfather         81     Cerebrovascular Disease Maternal Grandfather      Diabetes Maternal Grandfather      Other Cancer Maternal Grandfather      Cerebrovascular Disease Paternal Grandfather         Multiple CVA     Breast Cancer Paternal Grandmother      Cancer Paternal Grandmother      Diabetes Father      Asthma Father      Coronary Artery Disease Other      Liver Cancer Other      Depression Sister      Anxiety Disorder Sister      Coronary Artery Disease Paternal Uncle      Heart Failure Paternal Uncle      Coronary Artery Disease Paternal Uncle      Heart Failure Paternal Uncle      Hypertension No family hx of      Thyroid Disease No family hx of      Alcohol/Drug No family hx of      Cancer - colorectal No family hx of      Prostate Cancer No family hx of      Allergies No family hx of      Arthritis No family hx of      Alzheimer Disease No family hx of      Anesthesia Reaction No family hx of      Ovarian Cancer No family hx of      Mental Illness No family hx of      Osteoporosis No family hx of      Known Genetic Syndrome No family hx of      Obesity No family hx of      Unknown/Adopted No family hx of      Mental Illness No family hx of      Substance Abuse No family hx of       Genetic Disorder No family hx of      Hyperlipidemia No family hx of      Colon Cancer No family hx of          Current Outpatient Medications   Medication Sig Dispense Refill     filgrastim-sndz (ZARXIO) 480 MCG/0.8ML syringe Inject two to three times a week as instructed 12 Syringe 11     Multiple Vitamins-Minerals (MULTIVITAMIN ADULT PO)        norethindrone-ethinyl estradiol (ORTHO-NOVUM 1-35 TAB,NORTREL 1-35 TAB) 1-35 MG-MCG tablet Take 1 tablet by mouth daily 112 tablet 2     Probiotic Product (PROBIOTIC ADVANCED PO)        albuterol (PROAIR HFA/PROVENTIL HFA/VENTOLIN HFA) 108 (90 Base) MCG/ACT inhaler Inhale 2 puffs into the lungs every 4 hours as needed for shortness of breath / dyspnea or wheezing (Patient not taking: Reported on 3/25/2019) 1 Inhaler 1     UNABLE TO FIND MEDICATION NAME: hair skin nails       Allergies   Allergen Reactions     No Known Drug Allergies      Recent Labs   Lab Test 02/14/17  1442 03/10/15  01/09/14  1346  05/24/11  1037   LDL  --   --   --  72  --   --    HDL  --   --   --  57  --   --    TRIG  --   --   --  115  --   --    ALT 25  --   --   --   --  15   CR 0.63 0.83   < >  --    < > 0.58   GFRESTIMATED >90  Non  GFR Calc   >60   < >  --    < > >90   GFRESTBLACK >90   GFR Calc   >60   < >  --    < > >90   POTASSIUM 3.4 4.0   < >  --   --  4.0   TSH  --   --   --  1.84  --  1.42    < > = values in this interval not displayed.      BP Readings from Last 3 Encounters:   07/17/19 100/62   06/13/19 105/69   03/25/19 100/58    Wt Readings from Last 3 Encounters:   07/17/19 65.3 kg (144 lb)   06/13/19 64 kg (141 lb 2 oz)   03/25/19 63.9 kg (140 lb 12.8 oz)                    Reviewed and updated as needed this visit by Provider         Review of Systems   ROS COMP: Constitutional, HEENT, cardiovascular, pulmonary, gi and gu systems are negative, except as otherwise noted.      Objective    /62   Pulse 84   Temp 98.2  F (36.8  C) (Temporal)   " Ht 1.715 m (5' 7.52\")   Wt 65.3 kg (144 lb)   SpO2 100%   BMI 22.21 kg/m    Body mass index is 22.21 kg/m .  Physical Exam   GENERAL: healthy, alert and no distress  NECK: no adenopathy, no asymmetry, masses, or scars and thyroid normal to palpation  RESP: lungs clear to auscultation - no rales, rhonchi or wheezes  CV: regular rate and rhythm, normal S1 S2, no S3 or S4, no murmur, click or rub, no peripheral edema and peripheral pulses strong  ABDOMEN: soft, nontender, no hepatosplenomegaly, no masses and bowel sounds normal   (female): normal female external genitalia, normal urethral meatus , vaginal mucosa pink, moist, well rugated and normal cervix, adnexae, and uterus without masses.  MS: no gross musculoskeletal defects noted, no edema  BACK:  No CVA tenderness with percussion, no paralumbar tenderness  SKIN: no suspicious lesions or rashes  NEURO: Normal strength and tone, mentation intact and speech normal  PSYCH: mentation appears normal, affect normal/bright    Diagnostic Test Results:  Labs reviewed in Epic  Results for orders placed or performed in visit on 07/17/19   Wet prep   Result Value Ref Range    Specimen Description Vagina     Wet Prep No Trichomonas seen     Wet Prep No clue cells seen     Wet Prep No yeast seen     Wet Prep Few  WBC'S seen          Order   Wet prep [[id number]] (Order [id number])   Exam Information     Exam Date Exam Time Accession # Results    7/17/19  1:07 PM [id number]    Specimen Information: Vagina        Component Collected Lab   Specimen Description 07/17/2019  1:07 PM RG   Vagina    Wet Prep 07/17/2019  1:07 PM RG   No Trichomonas seen    Wet Prep 07/17/2019  1:07 PM RG   No clue cells seen    Wet Prep 07/17/2019  1:07 PM RG   No yeast seen    Wet Prep 07/17/2019  1:07 PM RG   Few   WBC'S seen    Order   UA with Microscopic reflex to Culture [[id number]] (Order [id number])   Exam Information     Exam Date Exam Time Accession # Results    7/17/19  9:23 " AM [id number]    Specimen Information: Unspecified Urine        Component Value Flag Ref Range Units Status Collected Lab   Color Urine Yellow     Final 07/17/2019  9:23 AM RG   Appearance Urine Clear     Final 07/17/2019  9:23 AM RG   Glucose Urine Negative   NEG^Negative mg/dL Final 07/17/2019  9:23 AM RG   Bilirubin Urine Negative   NEG^Negative  Final 07/17/2019  9:23 AM RG   Ketones Urine Negative   NEG^Negative mg/dL Final 07/17/2019  9:23 AM RG   Specific Gravity Urine 1.020   1.003 - 1.035  Final 07/17/2019  9:23 AM RG   pH Urine 7.5  High   5.0 - 7.0 pH Final 07/17/2019  9:23 AM RG   Protein Albumin Urine Negative   NEG^Negative mg/dL Final 07/17/2019  9:23 AM RG   Urobilinogen Urine 0.2   0.2 - 1.0 EU/dL Final 07/17/2019  9:23 AM RG   Nitrite Urine Negative   NEG^Negative  Final 07/17/2019  9:23 AM RG   Blood Urine Small  Abnormal   NEG^Negative  Final 07/17/2019  9:23 AM RG   Leukocyte Esterase Urine Negative   NEG^Negative  Final 07/17/2019  9:23 AM RG   Source     Final 07/17/2019  9:23 AM RG   Unspecified Urine    WBC Urine 0 - 5   OTO5^0 - 5 /HPF Final 07/17/2019  9:23 AM RG   RBC Urine 2-5  Abnormal   OTO2^O - 2 /HPF Final 07/17/2019  9:23 AM RG   Bacteria Urine Few  Abnormal   NEG^Negative /HPF Final 07/17/2019  9:23 AM              Assessment & Plan     1. Dysuria  - Wet prep  - sulfamethoxazole-trimethoprim (BACTRIM DS/SEPTRA DS) 800-160 MG tablet; Take 1 tablet by mouth 2 times daily for 3 days  Dispense: 6 tablet; Refill: 0  - Neisseria gonorrhoeae PCR  - Chlamydia trachomatis PCR  2. Fatigue, unspecified type  3. Night sweats    Plan # 1-3:    Urinalysis negative for leukocytes, and nitrites.  Few WBCs with wet prep - no clue cells or yeast.  Gonorrhea and Chlamydia specimens collected, sent to lab, and results pending.  She denies vaginal discharge, odor, or itching.  Will treat based on symptoms with Bactrim DS x 3 days.  Advised patient to return in 2-3 days if symptoms are not improving or  becoming worse.  I will call her with chlamydia and gonorrhea testing results.  She agrees to treatment plan.      Patient Instructions   I will call you with results of testing from today.    Ensure you are drinking plenty of fluid.  Take full course of antibiotics- Bactrim DS  twice daily for 3 days.  Urine culture pending, we will call you only if culture shows resistance and change of antibiotic is required or if no growth to stop antibiotics and to follow-up with your primary care provider.  May use over the counter AZO pain relief or Uristat (phenazopyridine) for urinary burning but do not use for 24 hours before future urine tests.  Drink plenty of fluids. Limit caffeine and alcohol as these are bladder irritants.  May take tylenol or ibuprofen as needed for discomfort.   If you develop any vomiting, high fevers or lower back pain, these can be signs of a kidney infection and you should be seen in urgent care or in the ER.  Prevention of future infections by drinking cranberry juice, urination after intercourse and wiping from front to back after using the toilet.    Please follow up with primary care provider if symptoms return, if you're not improving, worsening or new symptoms or for any adverse reactions to medications    Lori Nina CNP          Return in about 3 days (around 7/20/2019) for Symptoms Not Improving/Getting Worse.    Lori Nina CNP  [facility]

## 2019-07-18 ENCOUNTER — TELEPHONE (OUTPATIENT)
Dept: FAMILY MEDICINE | Facility: CLINIC | Age: 39
End: 2019-07-18

## 2019-07-18 NOTE — TELEPHONE ENCOUNTER
----- Message from Lori Nina CNP sent at 7/18/2019  2:34 PM CDT -----  Can you please call patient and tell her these tests came back negative?  Also see how she is feeling today?    Lori Loyola

## 2019-07-18 NOTE — TELEPHONE ENCOUNTER
"I spoke with patient.   She continues to feel \"the same\".   She has the urge to urinate frequently and burning when she goes.   She normally urinates three times a day and has gone 10-15 times today - sometimes there is not much that comes out.   Her stomach feels \"swollen\" or \"bloated\" - there is no pain.   She feels fatigued with no energy. She gets 6.5 hours of sleep every night, which is pretty normal.   \"My kids are going to want to go swimming tonight and I'm going to have to tell them no\" (because she is fatigued).   She thinks that the lymph nodes in her neck are \"a tiny bit swollen\" but denies sore throat or other symptoms.   Advised again that tests are negative and it may require continued monitoring from her.   Will have Lori advise.    Zenia Eubanks, RN, BSN    "

## 2019-07-19 NOTE — TELEPHONE ENCOUNTER
Pt called back, relayed message. She states she is feeling less lethargic then yesterday but she still has all the same sx.

## 2019-07-19 NOTE — TELEPHONE ENCOUNTER
Can you please call patient and tell her that I recommend she make a follow-up visit?  I treated her for UTI based on symptoms - but all her testing that day came back negative.  If symptoms are not improving, she needs to be seen again.  Thank you.  Lori

## 2019-10-30 NOTE — PATIENT INSTRUCTIONS
Urine culture pending.  May use over the counter AZO pain relief or Uristat (phenazopyridine) for urinary burning but do not use for 24 hours before future urine tests.  Drink plenty of fluids. Limit caffeine and alcohol as these are bladder irritants.  May take tylenol or ibuprofen as needed for discomfort.   If you develop any vomiting, high fevers or lower back pain, these can be signs of a kidney infection and you should be seen in urgent care or in the ER.  Prevention of future infections by drinking cranberry juice, urination after intercourse and wiping from front to back after using the toilet.  Please follow up with primary care provider if symptoms return, if you're not improving, worsening or new symptoms or for any adverse reactions to medications.    Fasting labs to be completed.  Please call or return to clinic for any questions, concerns, or symptoms that are not improving or becoming worse.    Lori Nina, CNP      Preventive Health Recommendations  Female Ages 26 - 39  Yearly exam:   See your health care provider every year in order to    Review health changes.     Discuss preventive care.      Review your medicines if you your doctor has prescribed any.    Until age 30: Get a Pap test every three years (more often if you have had an abnormal result).    After age 30: Talk to your doctor about whether you should have a Pap test every 3 years or have a Pap test with HPV screening every 5 years.   You do not need a Pap test if your uterus was removed (hysterectomy) and you have not had cancer.  You should be tested each year for STDs (sexually transmitted diseases), if you're at risk.   Talk to your provider about how often to have your cholesterol checked.  If you are at risk for diabetes, you should have a diabetes test (fasting glucose).  Shots: Get a flu shot each year. Get a tetanus shot every 10 years.   Nutrition:     Eat at least 5 servings of fruits and vegetables each day.    Eat  whole-grain bread, whole-wheat pasta and brown rice instead of white grains and rice.    Get adequate Calcium and Vitamin D.     Lifestyle    Exercise at least 150 minutes a week (30 minutes a day, 5 days of the week). This will help you control your weight and prevent disease.    Limit alcohol to one drink per day.    No smoking.     Wear sunscreen to prevent skin cancer.    See your dentist every six months for an exam and cleaning.
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
## 2019-10-31 ENCOUNTER — TELEPHONE (OUTPATIENT)
Dept: FAMILY MEDICINE | Facility: CLINIC | Age: 39
End: 2019-10-31

## 2019-10-31 ENCOUNTER — OFFICE VISIT (OUTPATIENT)
Dept: FAMILY MEDICINE | Facility: CLINIC | Age: 39
End: 2019-10-31
Payer: COMMERCIAL

## 2019-10-31 VITALS
BODY MASS INDEX: 23.09 KG/M2 | WEIGHT: 147.1 LBS | HEIGHT: 67 IN | OXYGEN SATURATION: 100 % | SYSTOLIC BLOOD PRESSURE: 122 MMHG | DIASTOLIC BLOOD PRESSURE: 70 MMHG | HEART RATE: 78 BPM | RESPIRATION RATE: 16 BRPM | TEMPERATURE: 97.7 F

## 2019-10-31 DIAGNOSIS — Z00.00 ROUTINE GENERAL MEDICAL EXAMINATION AT A HEALTH CARE FACILITY: Primary | ICD-10-CM

## 2019-10-31 DIAGNOSIS — Z83.3 FAMILY HISTORY OF DIABETES MELLITUS: ICD-10-CM

## 2019-10-31 DIAGNOSIS — F51.02 ADJUSTMENT INSOMNIA: ICD-10-CM

## 2019-10-31 DIAGNOSIS — Z23 NEED FOR INFLUENZA VACCINATION: ICD-10-CM

## 2019-10-31 DIAGNOSIS — D70.8 AUTOIMMUNE NEUTROPENIA (H): ICD-10-CM

## 2019-10-31 DIAGNOSIS — R30.0 DYSURIA: ICD-10-CM

## 2019-10-31 DIAGNOSIS — Z80.9 FAMILY HISTORY OF CANCER: ICD-10-CM

## 2019-10-31 PROBLEM — K56.609 SBO (SMALL BOWEL OBSTRUCTION) (H): Status: ACTIVE | Noted: 2017-12-03

## 2019-10-31 PROBLEM — K43.2 INCISIONAL HERNIA, WITHOUT OBSTRUCTION OR GANGRENE: Status: ACTIVE | Noted: 2017-06-13

## 2019-10-31 LAB
ALBUMIN UR-MCNC: 30 MG/DL
APPEARANCE UR: CLEAR
BACTERIA #/AREA URNS HPF: ABNORMAL /HPF
BILIRUB UR QL STRIP: NEGATIVE
COLOR UR AUTO: YELLOW
GLUCOSE UR STRIP-MCNC: NEGATIVE MG/DL
HGB UR QL STRIP: ABNORMAL
KETONES UR STRIP-MCNC: ABNORMAL MG/DL
LEUKOCYTE ESTERASE UR QL STRIP: ABNORMAL
MUCOUS THREADS #/AREA URNS LPF: PRESENT /LPF
NITRATE UR QL: NEGATIVE
NON-SQ EPI CELLS #/AREA URNS LPF: ABNORMAL /LPF
PH UR STRIP: 7 PH (ref 5–7)
RBC #/AREA URNS AUTO: ABNORMAL /HPF
SOURCE: ABNORMAL
SP GR UR STRIP: 1.02 (ref 1–1.03)
SPECIMEN SOURCE: NORMAL
UROBILINOGEN UR STRIP-ACNC: 1 EU/DL (ref 0.2–1)
WBC #/AREA URNS AUTO: ABNORMAL /HPF
WET PREP SPEC: NORMAL

## 2019-10-31 PROCEDURE — 87210 SMEAR WET MOUNT SALINE/INK: CPT | Performed by: NURSE PRACTITIONER

## 2019-10-31 PROCEDURE — 81001 URINALYSIS AUTO W/SCOPE: CPT | Performed by: NURSE PRACTITIONER

## 2019-10-31 PROCEDURE — 90471 IMMUNIZATION ADMIN: CPT | Performed by: NURSE PRACTITIONER

## 2019-10-31 PROCEDURE — 99395 PREV VISIT EST AGE 18-39: CPT | Mod: 25 | Performed by: NURSE PRACTITIONER

## 2019-10-31 PROCEDURE — 90686 IIV4 VACC NO PRSV 0.5 ML IM: CPT | Performed by: NURSE PRACTITIONER

## 2019-10-31 PROCEDURE — 99213 OFFICE O/P EST LOW 20 MIN: CPT | Mod: 25 | Performed by: NURSE PRACTITIONER

## 2019-10-31 PROCEDURE — 87086 URINE CULTURE/COLONY COUNT: CPT | Performed by: NURSE PRACTITIONER

## 2019-10-31 RX ORDER — TRAZODONE HYDROCHLORIDE 50 MG/1
50 TABLET, FILM COATED ORAL AT BEDTIME
Qty: 15 TABLET | Refills: 0 | Status: SHIPPED | OUTPATIENT
Start: 2019-10-31 | End: 2019-12-09

## 2019-10-31 ASSESSMENT — ENCOUNTER SYMPTOMS
PARESTHESIAS: 0
HEMATOCHEZIA: 0
BREAST MASS: 0
HEADACHES: 0
ARTHRALGIAS: 0
CONSTIPATION: 0
FREQUENCY: 1
FEVER: 0
SHORTNESS OF BREATH: 0
ABDOMINAL PAIN: 0
CHILLS: 0
COUGH: 0
SORE THROAT: 0
PALPITATIONS: 0
JOINT SWELLING: 0
DYSURIA: 1
NAUSEA: 0
MYALGIAS: 0
HEMATURIA: 0
EYE PAIN: 0
DIARRHEA: 0
WEAKNESS: 0
HEARTBURN: 0
NERVOUS/ANXIOUS: 0
DIZZINESS: 0

## 2019-10-31 ASSESSMENT — MIFFLIN-ST. JEOR: SCORE: 1384.87

## 2019-10-31 NOTE — TELEPHONE ENCOUNTER
Pt informed of message  - Cancer Risk Management Program - Cancer Genetic Counseling - [phone] to initiate scheduling.

## 2019-10-31 NOTE — TELEPHONE ENCOUNTER
Can you please call Alyssa and let her know that I found out we have a Cancer Risk Management Team and I placed a referral for her just now.  They should reach out to her to schedule an appointment for genetic counseling and testing.  If she does not hear anything by next week, please call us and we can assist her with scheduling.    Thank you.  Lori Nina, CNP

## 2019-10-31 NOTE — LETTER
My Asthma Action Plan    Name: Alyssa Manzano   YOB: 1980  Date: 10/31/2019   My doctor: Lori Nina CNP   My clinic: [facility]        My Rescue Medicine:   Albuterol inhaler (Proair/Ventolin/Proventil HFA)  2-4 puffs EVERY 4 HOURS as needed. Use a spacer if recommended by your provider.   My Asthma Severity:   Intermittent / Exercise Induced  Know your asthma triggers: cold air  None          GREEN ZONE   Good Control    I feel good    No cough or wheeze    Can work, sleep and play without asthma symptoms       Take your asthma control medicine every day.     1. If exercise triggers your asthma, take your rescue medication    15 minutes before exercise or sports, and    During exercise if you have asthma symptoms  2. Spacer to use with inhaler: If you have a spacer, make sure to use it with your inhaler             YELLOW ZONE Getting Worse  I have ANY of these:    I do not feel good    Cough or wheeze    Chest feels tight    Wake up at night   1. Keep taking your Green Zone medications  2. Start taking your rescue medicine:    every 20 minutes for up to 1 hour. Then every 4 hours for 24-48 hours.  3. If you stay in the Yellow Zone for more than 12-24 hours, contact your doctor.  4. If you do not return to the Green Zone in 12-24 hours or you get worse, start taking your oral steroid medicine if prescribed by your provider.           RED ZONE Medical Alert - Get Help  I have ANY of these:    I feel awful    Medicine is not helping    Breathing getting harder    Trouble walking or talking    Nose opens wide to breathe       1. Take your rescue medicine NOW  2. If your provider has prescribed an oral steroid medicine, start taking it NOW  3. Call your doctor NOW  4. If you are still in the Red Zone after 20 minutes and you have not reached your doctor:    Take your rescue medicine again and    Call 911 or go to the emergency room right away    See your regular doctor within 2  weeks of an Emergency Room or Urgent Care visit for follow-up treatment.          Annual Reminders:  Meet with Asthma Educator,  Flu Shot in the Fall, consider Pneumonia Vaccination for patients with asthma (aged 19 and older).    Pharmacy:    [facility] - [place], [place] - [address], [address]  [id number] - [place], [place] - [address]  [facility] - [place], [place] - [facility]                        Asthma Triggers  How To Control Things That Make Your Asthma Worse    Triggers are things that make your asthma worse.  Look at the list below to help you find your triggers and   what you can do about them. You can help prevent asthma flare-ups by staying away from your triggers.      Trigger                                                          What you can do   Cigarette Smoke  Tobacco smoke can make asthma worse. Do not allow smoking in your home, car or around you.  Be sure no one smokes at a child s day care or school.  If you smoke, ask your health care provider for ways to help you quit.  Ask family members to quit too.  Ask your health care provider for a referral to Quit Plan to help you quit smoking, or call 3-558-667-PLAN.     Colds, Flu, Bronchitis  These are common triggers of asthma. Wash your hands often.  Don t touch your eyes, nose or mouth.  Get a flu shot every year.     Dust Mites  These are tiny bugs that live in cloth or carpet. They are too small to see. Wash sheets and blankets in hot water every week.   Encase pillows and mattress in dust mite proof covers.  Avoid having carpet if you can. If you have carpet, vacuum weekly.   Use a dust mask and HEPA vacuum.   Pollen and Outdoor Mold  Some people are allergic to trees, grass, or weed pollen, or molds. Try to keep your windows closed.  Limit time out doors when pollen count is high.   Ask you health care provider about taking medicine during allergy season.     Animal  Dander  Some people are allergic to skin flakes, urine or saliva from pets with fur or feathers. Keep pets with fur or feathers out of your home.    If you can t keep the pet outdoors, then keep the pet out of your bedroom.  Keep the bedroom door closed.  Keep pets off cloth furniture and away from stuffed toys.     Mice, Rats, and Cockroaches  Some people are allergic to the waste from these pests.   Cover food and garbage.  Clean up spills and food crumbs.  Store grease in the refrigerator.   Keep food out of the bedroom.   Indoor Mold  This can be a trigger if your home has high moisture. Fix leaking faucets, pipes, or other sources of water.   Clean moldy surfaces.  Dehumidify basement if it is damp and smelly.   Smoke, Strong Odors, and Sprays  These can reduce air quality. Stay away from strong odors and sprays, such as perfume, powder, hair spray, paints, smoke incense, paint, cleaning products, candles and new carpet.   Exercise or Sports  Some people with asthma have this trigger. Be active!  Ask your doctor about taking medicine before sports or exercise to prevent symptoms.    Warm up for 5-10 minutes before and after sports or exercise.     Other Triggers of Asthma  Cold air:  Cover your nose and mouth with a scarf.  Sometimes laughing or crying can be a trigger.  Some medicines and food can trigger asthma.

## 2019-11-01 ENCOUNTER — MYC MEDICAL ADVICE (OUTPATIENT)
Dept: FAMILY MEDICINE | Facility: CLINIC | Age: 39
End: 2019-11-01

## 2019-11-01 LAB
BACTERIA SPEC CULT: NORMAL
Lab: NORMAL
SPECIMEN SOURCE: NORMAL

## 2019-11-02 ASSESSMENT — ASTHMA QUESTIONNAIRES: ACT_TOTALSCORE: 25

## 2019-11-06 ENCOUNTER — TELEPHONE (OUTPATIENT)
Dept: FAMILY MEDICINE | Facility: CLINIC | Age: 39
End: 2019-11-06

## 2019-11-06 DIAGNOSIS — D70.8 AUTOIMMUNE NEUTROPENIA (H): Primary | ICD-10-CM

## 2019-11-06 DIAGNOSIS — Z83.3 FAMILY HISTORY OF DIABETES MELLITUS: ICD-10-CM

## 2019-11-06 DIAGNOSIS — D70.8 AUTOIMMUNE NEUTROPENIA (H): ICD-10-CM

## 2019-11-06 DIAGNOSIS — Z00.00 ROUTINE GENERAL MEDICAL EXAMINATION AT A HEALTH CARE FACILITY: ICD-10-CM

## 2019-11-06 LAB
ALBUMIN SERPL-MCNC: 4 G/DL (ref 3.4–5)
ALP SERPL-CCNC: 49 U/L (ref 40–150)
ALT SERPL W P-5'-P-CCNC: 18 U/L (ref 0–50)
ANION GAP SERPL CALCULATED.3IONS-SCNC: 6 MMOL/L (ref 3–14)
AST SERPL W P-5'-P-CCNC: 11 U/L (ref 0–45)
BILIRUB SERPL-MCNC: 0.9 MG/DL (ref 0.2–1.3)
BUN SERPL-MCNC: 14 MG/DL (ref 7–30)
CALCIUM SERPL-MCNC: 8.8 MG/DL (ref 8.5–10.1)
CHLORIDE SERPL-SCNC: 107 MMOL/L (ref 94–109)
CHOLEST SERPL-MCNC: 158 MG/DL
CO2 SERPL-SCNC: 27 MMOL/L (ref 20–32)
CREAT SERPL-MCNC: 0.62 MG/DL (ref 0.52–1.04)
DEPRECATED CALCIDIOL+CALCIFEROL SERPL-MC: 39 UG/L (ref 20–75)
DIFFERENTIAL METHOD BLD: NORMAL
EOSINOPHIL NFR BLD AUTO: 3 %
ERYTHROCYTE [DISTWIDTH] IN BLOOD BY AUTOMATED COUNT: 14.8 % (ref 10–15)
GFR SERPL CREATININE-BSD FRML MDRD: >90 ML/MIN/{1.73_M2}
GLUCOSE SERPL-MCNC: 79 MG/DL (ref 70–99)
HBA1C MFR BLD: 4.3 % (ref 0–5.6)
HCT VFR BLD AUTO: 37.6 % (ref 35–47)
HDLC SERPL-MCNC: 60 MG/DL
HGB BLD-MCNC: 12.2 G/DL (ref 11.7–15.7)
LDLC SERPL CALC-MCNC: 77 MG/DL
LYMPHOCYTES NFR BLD AUTO: 38 %
MCH RBC QN AUTO: 31.2 PG (ref 26.5–33)
MCHC RBC AUTO-ENTMCNC: 32.4 G/DL (ref 31.5–36.5)
MCV RBC AUTO: 96 FL (ref 78–100)
MONOCYTES NFR BLD AUTO: 10 %
NEUTROPHILS NFR BLD AUTO: 49 %
NONHDLC SERPL-MCNC: 98 MG/DL
PLATELET # BLD AUTO: 226 10E9/L (ref 150–450)
POTASSIUM SERPL-SCNC: 3.9 MMOL/L (ref 3.4–5.3)
PROT SERPL-MCNC: 7.4 G/DL (ref 6.8–8.8)
RBC # BLD AUTO: 3.91 10E12/L (ref 3.8–5.2)
SODIUM SERPL-SCNC: 140 MMOL/L (ref 133–144)
TRIGL SERPL-MCNC: 106 MG/DL
TSH SERPL DL<=0.005 MIU/L-ACNC: 1.7 MU/L (ref 0.4–4)
WBC # BLD AUTO: 2 10E9/L (ref 4–11)

## 2019-11-06 PROCEDURE — 80061 LIPID PANEL: CPT | Performed by: NURSE PRACTITIONER

## 2019-11-06 PROCEDURE — 82306 VITAMIN D 25 HYDROXY: CPT | Performed by: NURSE PRACTITIONER

## 2019-11-06 PROCEDURE — 80050 GENERAL HEALTH PANEL: CPT | Performed by: NURSE PRACTITIONER

## 2019-11-06 PROCEDURE — 36415 COLL VENOUS BLD VENIPUNCTURE: CPT | Performed by: NURSE PRACTITIONER

## 2019-11-06 PROCEDURE — 83036 HEMOGLOBIN GLYCOSYLATED A1C: CPT | Performed by: NURSE PRACTITIONER

## 2019-11-06 NOTE — TELEPHONE ENCOUNTER
I called the patient back with a critical lab result.  I was given a print out critical lab value with a WBC count of 1.99 with differential showing a ANC of 0.98.     I reviewed patient's records which showed history of autoimmune and patient is taking Neupogen.  I called patient to discuss the results she states she is not having any infectious symptoms at this time.  She has been taking her new regimen every 5 to 7 days rather than a study twice weekly as previously discussed with her oncology note from 6/3/2019.    I then spoke with the oncology provider Krissy draper NP who recommended she go back to taking her medication twice weekly as previously discussed.  Should have a micaela drawn 24 hours after her last dose sometime next week.  If this is back to her stated goal of ANC greater than 1 she does not need to follow-up any sooner with oncology.    I then called the patient back these instructions.  She will obtain repeat blood testing with the order I placed next week.  Questions answered.    Caleb Bal MD  [facility]    This chart is completed utilizing dictation software; typos and/or incorrect word substitutions may unintentionally occur.

## 2019-11-07 ENCOUNTER — MYC MEDICAL ADVICE (OUTPATIENT)
Dept: FAMILY MEDICINE | Facility: CLINIC | Age: 39
End: 2019-11-07

## 2019-11-07 NOTE — TELEPHONE ENCOUNTER
"Provider, do you want pt to call on the referral you placed.  It does not appear that they call the pt first.    \"Your provider has referred you to the Cancer Risk Management Program - Cancer Genetic Counseling.    Reason for Referral: Family history of breast and liver cancer.  Patient would like to be tested.    We have a sent a notice to a staff member of the Cancer Risk Management Program to give you a call to assist with scheduling your appointment.  You may also call  [phone] [phone]) to initiate scheduling.\"  "

## 2019-11-07 NOTE — TELEPHONE ENCOUNTER
Yes, can you please give Alyssa the information to schedule the genetic testing/counseling appointment?    Thank you!  Lori Nina, CNP

## 2019-11-08 ENCOUNTER — HEALTH MAINTENANCE LETTER (OUTPATIENT)
Age: 39
End: 2019-11-08

## 2019-11-21 DIAGNOSIS — Z30.41 ENCOUNTER FOR SURVEILLANCE OF CONTRACEPTIVE PILLS: ICD-10-CM

## 2019-11-21 RX ORDER — NORETHINDRONE AND ETHINYL ESTRADIOL 1 MG-35MCG
KIT ORAL
Qty: 112 TABLET | Refills: 3 | Status: SHIPPED | OUTPATIENT
Start: 2019-11-21 | End: 2020-10-25

## 2019-11-21 NOTE — TELEPHONE ENCOUNTER
Prescription approved per [facility] Refill Protocol.  Barrera Garcia, RN, BSN

## 2019-12-09 ENCOUNTER — MYC REFILL (OUTPATIENT)
Dept: FAMILY MEDICINE | Facility: CLINIC | Age: 39
End: 2019-12-09

## 2019-12-09 DIAGNOSIS — F51.02 ADJUSTMENT INSOMNIA: ICD-10-CM

## 2019-12-09 NOTE — TELEPHONE ENCOUNTER
ONCOLOGY INTAKE: Records Information      APPT INFORMATION:  Referring provider:  Dr. Lori Nina  Referring provider s clinic:   [facility]  Reason for visit/diagnosis:  Family history of breast and liver cancer.  Has patient been notified of appointment date and time?: yes    RECORDS INFORMATION:  Were the records received with the referral (via [employer])? Internal Referral      Has patient been seen for any external appt for this diagnosis? no    If yes, where? NA

## 2019-12-10 RX ORDER — TRAZODONE HYDROCHLORIDE 50 MG/1
50 TABLET, FILM COATED ORAL AT BEDTIME
Qty: 15 TABLET | Refills: 0 | Status: SHIPPED | OUTPATIENT
Start: 2019-12-10 | End: 2020-12-22

## 2019-12-10 NOTE — TELEPHONE ENCOUNTER
Pending Prescriptions:                       Disp   Refills    traZODone (DESYREL) 50 MG tablet           15 tab*0        Sig: Take 1 tablet (50 mg) by mouth At Bedtime    Routing refill request to provider for review/approval because:  Please advise on long term use.  Script was written for 2 weeks.  Barrera Garcia, RN, BSN

## 2019-12-18 ENCOUNTER — PRE VISIT (OUTPATIENT)
Dept: ONCOLOGY | Facility: CLINIC | Age: 39
End: 2019-12-18

## 2019-12-18 ENCOUNTER — OFFICE VISIT (OUTPATIENT)
Dept: ONCOLOGY | Facility: CLINIC | Age: 39
End: 2019-12-18
Attending: NURSE PRACTITIONER
Payer: COMMERCIAL

## 2019-12-18 DIAGNOSIS — Z80.3 FAMILY HISTORY OF MALIGNANT NEOPLASM OF BREAST: ICD-10-CM

## 2019-12-18 DIAGNOSIS — Z80.3 FAMILY HISTORY OF MALIGNANT NEOPLASM OF BREAST: Primary | ICD-10-CM

## 2019-12-18 DIAGNOSIS — Z80.51 FAMILY HISTORY OF KIDNEY CANCER: ICD-10-CM

## 2019-12-18 DIAGNOSIS — Z80.0 FAMILY HISTORY OF LIVER CANCER: ICD-10-CM

## 2019-12-18 LAB — MISCELLANEOUS TEST: NORMAL

## 2019-12-18 PROCEDURE — 36415 COLL VENOUS BLD VENIPUNCTURE: CPT

## 2019-12-18 PROCEDURE — 96040 ZZH GENETIC COUNSELING, EACH 30 MINUTES: CPT | Mod: ZF | Performed by: GENETIC COUNSELOR, MS

## 2019-12-18 NOTE — NURSING NOTE
Chief Complaint   Patient presents with     Blood Draw     Labs drawn via VPT by RN in lab.     Eleanor Velasquez RN

## 2019-12-18 NOTE — LETTER
12/18/2019       RE: Alyssa Manzano  [address] [facility] [facility] [address]     Dear Colleague,    Thank you for referring your patient, Alyssa Manzano, to the [facility]. Please see a copy of my visit note below.    12/18/2019    Referring Provider: Lori Nina CNP    Presenting Information:   I met with Alyssa Manzano today for genetic counseling at the Cancer Risk Management Program at the [facility] to discuss her family history of cancer.  She is here today to review this history, cancer screening recommendations, and available genetic testing options.    Personal History:  Alyssa is a 38 year old female. She does not have any personal history of cancer.     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       She had her first menstrual period at age 15, her first child at age 30, and is premenopausal. Alyssa had salpingectomy for sterilization in 2014. Her ovaries and uterus are intact. She had a pelvic ultrasound on 12/2/15 that was normal. She reports that she has not used hormone replacement therapy or infertility medications. She is currently using oral contraceptives. She has not had any breast imaging or a colonoscopy due to her age. Alyssa reported tobacco use in the past for one year and alcohol use of approximately 2 drinks per week.    Family History: (Please see scanned pedigree for detailed family history information)  Maternal:    Her mother is 69 years old with no known history of cancer.    Her maternal uncle was diagnosed with liver cancer at age 65 and passed away at age 65. He had no known history of exposures or smoking.     Her maternal grandfather was diagnosed with lung cancer at age 80 and passed away at age 81. He had a distant history of smoking and possible work-related exposures.  Paternal:    Her father was diagnosed with kidney cancer at age 70 and passed away recently at age 71. He also  had a history of colon polyps. He may have had exposures in the [employer].    Her paternal uncle was diagnosed with lung cancer and passed away in his mid 60s. He had a distant history of smoking. He also had a history of colon polyps.     Her paternal grandmother was diagnosed with breast cancer in her 40s and had a lumpectomy. She was then diagnosed with breast cancer again at age 90. She also had a lump in her leg at age 90 which was reported to be a cancer separate from her breast cancer.       No known genetic testing in any of her family members.    Her maternal ethnicity is [place]. Her paternal ethnicity is [place]. There is no known Ashkenazi Muslim ancestry on either side of her family.     Discussion:    Alyssa's family history of cancer is suggestive of a hereditary cancer syndrome.    We reviewed the features of sporadic, familial, and hereditary cancers. In looking at Alyssa's family history, it is possible that a cancer susceptibility gene is present due to her paternal grandmother's history of breast cancer in her 40s and again at age 90.    We discussed the BRCA1 and BRCA2 genes. Mutations in these genes cause a condition known as Hereditary Breast and Ovarian Cancer syndrome (HBOC). Women with a mutation in either of these genes are at increased risk for breast and ovarian cancer. There is also an increased risk for a second primary breast cancer. Men with a mutation in either of these genes are at increased risk for breast and prostate cancer. Both women and men may also be at increased risk for pancreatic cancer and melanoma. A detailed handout regarding these genes and other genes in which mutations are associated with an increased risk for breast cancer was provided to Alyssa at the end of our appointment today and can be found in the after visit summary. Topics included: inheritance pattern, cancer risks, cancer screening recommendations, and also risks, benefits and limitations of  testing.    Based on her personal and family history, Alyssa meets current National Comprehensive Cancer Network (NCCN) criteria for genetic testing of the BRCA1 and BRCA2 genes.      We discussed that there are additional genes that could cause increased risk for breast and other cancers. As many of these genes present with overlapping features in a family and accurate cancer risk cannot always be established based upon the pedigree analysis alone, it would be reasonable for Alyssa to consider panel genetic testing to analyze multiple genes at once.    We reviewed genetic testing options for hereditary breast and gynecologic cancers: actionable high/moderate breast and gynecologic cancer risk custom panel (CustomNext-Cancer, 19 genes, a combination of GynPlus and BRCAplus + NBN, STK11, and NF1), expanded high and moderate risk panel (OvaNext, 25 genes), or an expanded panel which includes additional genes associated with colon cancer, prostate cancer, and melanoma, among others (CancerNext, 34 genes). Alyssa expressed an interest in more broad testing.     Additionally, based on her father's history of kidney cancer and her paternal family history of colon polyps, we discussed the option of including additional genes in which mutations are associated with an increased risk for kidney cancer and colon polyps. Alyssa opted to add the RenalNext gene panel plus three additional genes associated with an increased risk for colon polyps to her testing.  Genetic testing is available for 34 genes associated with hereditary cancer: CancerNext (APC, MARIANN, BARD1, BRCA1, BRCA2, BRIP1, BMPR1A, CDH1, CDK4, CDKN2A, CHEK2, DICER1, EPCAM, GREM1, HOXB13, MLH1, MRE11A, MSH2, MSH6, MUTYH, NBN, NF1, PALB2, PMS2, POLD1, POLE, PTEN, RAD50, RAD51C, RAD51D, SMAD4, SMARCA4, STK11, and TP53).  We discussed that many of the genes in the CancerNext panel are associated with specific hereditary cancer syndromes and published management  guidelines: Hereditary Breast and Ovarian Cancer syndrome (BRCA1, BRCA2), Mcnulty syndrome (MLH1, MSH2, MSH6, PMS2, EPCAM), Familial Adenomatous Polyposis (APC), Hereditary Diffuse Gastric Cancer (CDH1), Familial Atypical Multiple Mole Melanoma syndrome (CDK4, CDKN2A), Juvenile Polyposis syndrome (BMPR1A, SMAD4), Cowden syndrome (PTEN), Li Fraumeni syndrome (TP53), Peutz-Jeghers syndrome (STK11), MUTYH Associated Polyposis (MUTYH), and Neurofibromatosis type 1 (NF1).   The MARIANN, BRIP1, CHEK2, GREM1, NBN, PALB2, POLD1, POLE, RAD51C, and RAD51D genes are associated with increased cancer risk and have published management guidelines for certain cancers.    The remaining genes (BARD1, DICER1, HOXB13, MRE11A, RAD50, and SMARCA4) are associated with increased cancer risk and may allow us to make medical recommendations when mutations are identified.    Alyssa was provided with a detailed brochure from [employer] explaining the CancerNext testing.   As described above, Alyssa opted to add the RenalNext gene panel plus three additional genes associated with an increased risk for colon polyps to her testing.  Consent was obtained and genetic testing for a CustomNext-Cancer panel (a combination of the CancerNext panel + RenalNext panel + AXIN2, MSH3, NTHL1) was sent to [facility]. Turn around time: 3-4 weeks.    Medical Management: For Alyssa, we reviewed that the information from genetic testing may determine:    additional cancer screening for which Alyssa may qualify (i.e. mammogram and breast MRI, more frequent colonoscopies, more frequent dermatologic exams, etc.),    options for risk reducing surgeries Alyssa could consider (i.e. bilateral mastectomy, surgery to remove her ovaries and/or uterus, etc.),      and targeted chemotherapies if she were to develop certain cancers in the future (i.e. immunotherapy for individuals with Mcnulty syndrome, PARP inhibitors, etc.).     These recommendations  will be discussed in detail once genetic testing is completed.     Plan:  1) Today Alyssa elected to proceed with genetic testing via a CustomNext-Cancer panel (a combination of the CancerNext panel + RenalNext panel + AXIN2, MSH3, NTHL1) offered by [facility].  2) This information should be available in 3-4 weeks.  3) Alyssa will receive a phone call to discuss the results.    Face to face time: 40 minutes    Mimi Rosenthal MS, [facility]  Licensed Genetic Counselor  Office: [phone]

## 2019-12-18 NOTE — PROGRESS NOTES
12/18/2019    Referring Provider: Lori Nina CNP    Presenting Information:   I met with Alyssa Manzano today for genetic counseling at the Cancer Risk Management Program at the [facility] to discuss her family history of cancer.  She is here today to review this history, cancer screening recommendations, and available genetic testing options.    Personal History:  Alyssa is a 38 year old female. She does not have any personal history of cancer.     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       She had her first menstrual period at age 15, her first child at age 30, and is premenopausal. Alyssa had salpingectomy for sterilization in 2014. Her ovaries and uterus are intact. She had a pelvic ultrasound on 12/2/15 that was normal. She reports that she has not used hormone replacement therapy or infertility medications. She is currently using oral contraceptives. She has not had any breast imaging or a colonoscopy due to her age. Alyssa reported tobacco use in the past for one year and alcohol use of approximately 2 drinks per week.    Family History: (Please see scanned pedigree for detailed family history information)  Maternal:    Her mother is 69 years old with no known history of cancer.    Her maternal uncle was diagnosed with liver cancer at age 65 and passed away at age 65. He had no known history of exposures or smoking.     Her maternal grandfather was diagnosed with lung cancer at age 80 and passed away at age 81. He had a distant history of smoking and possible work-related exposures.  Paternal:    Her father was diagnosed with kidney cancer at age 70 and passed away recently at age 71. He also had a history of colon polyps. He may have had exposures in the [employer].    Her paternal uncle was diagnosed with lung cancer and passed away in his mid 60s. He had a distant history of smoking. He also had a history of colon polyps.     Her paternal  grandmother was diagnosed with breast cancer in her 40s and had a lumpectomy. She was then diagnosed with breast cancer again at age 90. She also had a lump in her leg at age 90 which was reported to be a cancer separate from her breast cancer.       No known genetic testing in any of her family members.    Her maternal ethnicity is [place]. Her paternal ethnicity is Polish. There is no known Ashkenazi Worship ancestry on either side of her family.     Discussion:    Alyssa's family history of cancer is suggestive of a hereditary cancer syndrome.    We reviewed the features of sporadic, familial, and hereditary cancers. In looking at Alyssa's family history, it is possible that a cancer susceptibility gene is present due to her paternal grandmother's history of breast cancer in her 40s and again at age 90.    We discussed the BRCA1 and BRCA2 genes. Mutations in these genes cause a condition known as Hereditary Breast and Ovarian Cancer syndrome (HBOC). Women with a mutation in either of these genes are at increased risk for breast and ovarian cancer. There is also an increased risk for a second primary breast cancer. Men with a mutation in either of these genes are at increased risk for breast and prostate cancer. Both women and men may also be at increased risk for pancreatic cancer and melanoma. A detailed handout regarding these genes and other genes in which mutations are associated with an increased risk for breast cancer was provided to Alyssa at the end of our appointment today and can be found in the after visit summary. Topics included: inheritance pattern, cancer risks, cancer screening recommendations, and also risks, benefits and limitations of testing.    Based on her personal and family history, Alyssa meets current National Comprehensive Cancer Network (NCCN) criteria for genetic testing of the BRCA1 and BRCA2 genes.      We discussed that there are additional genes that could cause increased  risk for breast and other cancers. As many of these genes present with overlapping features in a family and accurate cancer risk cannot always be established based upon the pedigree analysis alone, it would be reasonable for Alyssa to consider panel genetic testing to analyze multiple genes at once.    We reviewed genetic testing options for hereditary breast and gynecologic cancers: actionable high/moderate breast and gynecologic cancer risk custom panel (CustomNext-Cancer, 19 genes, a combination of GynPlus and BRCAplus + NBN, STK11, and NF1), expanded high and moderate risk panel (OvaNext, 25 genes), or an expanded panel which includes additional genes associated with colon cancer, prostate cancer, and melanoma, among others (CancerNext, 34 genes). Alyssa expressed an interest in more broad testing.     Additionally, based on her father's history of kidney cancer and her paternal family history of colon polyps, we discussed the option of including additional genes in which mutations are associated with an increased risk for kidney cancer and colon polyps. Alyssa opted to add the RenalNext gene panel plus three additional genes associated with an increased risk for colon polyps to her testing.  Genetic testing is available for 34 genes associated with hereditary cancer: CancerNext (APC, MARIANN, BARD1, BRCA1, BRCA2, BRIP1, BMPR1A, CDH1, CDK4, CDKN2A, CHEK2, DICER1, EPCAM, GREM1, HOXB13, MLH1, MRE11A, MSH2, MSH6, MUTYH, NBN, NF1, PALB2, PMS2, POLD1, POLE, PTEN, RAD50, RAD51C, RAD51D, SMAD4, SMARCA4, STK11, and TP53).  We discussed that many of the genes in the CancerNext panel are associated with specific hereditary cancer syndromes and published management guidelines: Hereditary Breast and Ovarian Cancer syndrome (BRCA1, BRCA2), Mcnulty syndrome (MLH1, MSH2, MSH6, PMS2, EPCAM), Familial Adenomatous Polyposis (APC), Hereditary Diffuse Gastric Cancer (CDH1), Familial Atypical Multiple Mole Melanoma syndrome (CDK4,  CDKN2A), Juvenile Polyposis syndrome (BMPR1A, SMAD4), Cowden syndrome (PTEN), Li Fraumeni syndrome (TP53), Peutz-Jeghers syndrome (STK11), MUTYH Associated Polyposis (MUTYH), and Neurofibromatosis type 1 (NF1).   The MARIANN, BRIP1, CHEK2, GREM1, NBN, PALB2, POLD1, POLE, RAD51C, and RAD51D genes are associated with increased cancer risk and have published management guidelines for certain cancers.    The remaining genes (BARD1, DICER1, HOXB13, MRE11A, RAD50, and SMARCA4) are associated with increased cancer risk and may allow us to make medical recommendations when mutations are identified.    Alyssa was provided with a detailed brochure from [employer] [facility] explaining the CancerNext testing.   As described above, Alyssa opted to add the RenalNext gene panel plus three additional genes associated with an increased risk for colon polyps to her testing.  Consent was obtained and genetic testing for a CustomNext-Cancer panel (a combination of the CancerNext panel + RenalNext panel + AXIN2, MSH3, NTHL1) was sent to [facility]. Turn around time: 3-4 weeks.    Medical Management: For Alyssa, we reviewed that the information from genetic testing may determine:    additional cancer screening for which Alyssa may qualify (i.e. mammogram and breast MRI, more frequent colonoscopies, more frequent dermatologic exams, etc.),    options for risk reducing surgeries Alyssa could consider (i.e. bilateral mastectomy, surgery to remove her ovaries and/or uterus, etc.),      and targeted chemotherapies if she were to develop certain cancers in the future (i.e. immunotherapy for individuals with Mcnulty syndrome, PARP inhibitors, etc.).     These recommendations will be discussed in detail once genetic testing is completed.     Plan:  1) Today Alyssa elected to proceed with genetic testing via a CustomNext-Cancer panel (a combination of the CancerNext panel + RenalNext panel + AXIN2, MSH3, NTHL1) offered by [facility].  2) This information should be available in 3-4 weeks.  3) Alyssa will receive a phone call to discuss the results.    Face to face time: 40 minutes    Mimi Rosenthal MS, [facility]  Licensed Genetic Counselor  Office: [phone]

## 2019-12-18 NOTE — PATIENT INSTRUCTIONS
Assessing Cancer Risk  Only about 5-10% of cancers are thought to be due to an inherited cancer susceptibility gene.    These families often have:    Several people with the same or related types of cancer    Cancers diagnosed at a young age (before age 50)    Individuals with more than one primary cancer    Multiple generations of the family affected with cancer    Some people may be candidates for genetic testing of more than one gene.  For these families, genetic testing using a cancer panel may be offered.  These panels will test different genes known to increase the risk for breast, ovarian, uterine, and/or other cancers. All of the genes discussed below have published clinical management guidelines for individuals who are found to carry a mutation. The purpose of this handout is to serve as a brief summary of the genes analyzed by the panels used to inquire about hereditary breast and gynecologic cancer:  MARIANN, BRCA1, BRCA2, BRIP1, CDH1, CHEK2, MLH1, MSH2, MSH6, PMS2, EPCAM, PTEN, PALB2, RAD51C, RAD51D, and TP53.  ______________________________________________________________________________  Hereditary Breast and Ovarian Cancer Syndrome   (BRCA1 and BRCA2)  A single mutation in one of the copies of BRCA1 or BRCA2 increases the risk for breast and ovarian cancer, among others.  The risk for pancreatic cancer and melanoma may also be slightly increased in some families.  The chart below shows the chance that someone with a BRCA mutation would develop cancer in his or her lifetime1,2,3,4.        A person s ethnic background is also important to consider, as individuals of Ashkenazi Oriental orthodox ancestry have a higher chance of having a BRCA gene mutation.  There are three BRCA mutations that occur more frequently in this population.    Mcnulty Syndrome   (MLH1, MSH2, MSH6, PMS2, and EPCAM)  Currently five genes are known to cause Mcnulty Syndrome: MLH1, MSH2, MSH6, PMS2, and EPCAM.  A single mutation in one of the  Mcnulty Syndrome genes increases the risk for colon, endometrial, ovarian, and stomach cancers.  Other cancers that occur less commonly in Mcnulty Syndrome include urinary tract, skin, and brain cancers.  The chart below shows the chance that a person with Mcnulty syndrome would develop cancer in his or her lifetime5.      *Cancer risk varies depending on Mcnulty syndrome gene found    Cowden Syndrome   (PTEN)  Cowden syndrome is a hereditary condition that increases the risk for breast, thyroid, endometrial, colon, and kidney cancer.  Cowden syndrome is caused by a mutation in the PTEN gene.  A single mutation in one of the copies of PTEN causes Cowden syndrome and increases cancer risk.  The chart below shows the chance that someone with a PTEN mutation would develop cancer in their lifetime6,7.  Other benign features seen in some individuals with Cowden syndrome include benign skin lesions (facial papules, keratoses, lipomas), learning disability, autism, thyroid nodules, colon polyps, and larger head size.      *One recent study found breast cancer risk to be increased to 85%    Li-Fraumeni Syndrome   (TP53)  Li-Fraumeni Syndrome (LFS) is a cancer predisposition syndrome caused by a mutation in the TP53 gene. A single mutation in one of the copies of TP53 increases the risk for multiple cancers. Individuals with LFS are at an increased risk for developing cancer at a young age. The lifetime risk for development of a LFS-associated cancer is 50% by age 30 and 90% by age 60.   Core Cancers: Sarcomas, Breast, Brain, Lung, Leukemias/Lymphomas, Adrenocortical carcinomas  Other Cancers: Gastrointestinal, Thyroid, Skin, Genitourinary    Hereditary Diffuse Gastric Cancer   (CDH1)  Currently, one gene is known to cause hereditary diffuse gastric cancer (HDGC): CDH1.  Individuals with HDGC are at increased risk for diffuse gastric cancer and lobular breast cancer. Of people diagnosed with HDGC, 30-50% have a mutation in the CDH1  gene.  This suggests there are likely other genes that may cause HDGC that have not been identified yet.      Lifetime Cancer Risks    General Population HDGC    Diffuse Gastric  <1% ~80%   Breast 12% 39-52%         Additional Genes  MARIANN  MARIANN is a moderate-risk breast cancer gene. Women who have a mutation in MARIANN can have between a 2-4 fold increased risk for breast cancer compared to the general population8. MARIANN mutations have also been associated with increased risk for pancreatic cancer, however an estimate of this cancer risk is not well understood9. Individuals who inherit two MARIANN mutations have a condition called ataxia-telangiectasia (AT).  This rare autosomal recessive condition affects the nervous system and immune system, and is associated with progressive cerebellar ataxia beginning in childhood.  Individuals with ataxia-telangiectasia often have a weakened immune system and have an increased risk for childhood cancers.    PALB2  Mutations in PALB2 have been shown to increase the risk of breast cancer up to 33-58% in some families; where individuals fall within this risk range is dependent upon family [email or web address]. PALB2 mutations have also been associated with increased risk for pancreatic cancer, although this risk has not been quantified yet.  Individuals who inherit two PALB2 mutations--one from their mother and one from their father--have a condition called Fanconi Anemia.  This rare autosomal recessive condition is associated with short stature, developmental delay, bone marrow failure, and increased risk for childhood cancers.    CHEK2   CHEK2 is a moderate-risk breast cancer gene.  Women who have a mutation in CHEK2 have around a 2-fold increased risk for breast cancer compared to the general population, and this risk may be higher depending upon family history.11,12,13 Mutations in CHEK2 have also been shown to increase the risk of a number of other cancers, including colon and prostate, however  these cancer risks are currently not well understood.    BRIP1, RAD51C and RAD51D  Mutations in BRIP1, RAD51C, and RAD51D have been shown to increase the risk of ovarian cancer and possibly female breast cancer as well14,15 .       Lifetime Cancer Risk    General Population BRIP1 RAD51C RAD51D   Ovarian 1-2% ~5-8% ~5-9% ~7-15%           Inheritance  All of the cancer syndromes reviewed above are inherited in an autosomal dominant pattern.  This means that if a parent has a mutation, each of his or her children will have a 50% chance of inheriting that same mutation.  Therefore, each child--male or female--would have a 50% chance of being at increased risk for developing cancer.      Image obtained from Genetics Home Reference, 2013     Mutations in some genes can occur de kendrick, which means that a person s mutation occurred for the first time in them and was not inherited from a parent.  Now that they have the mutation, however, it can be passed on to future generations.    Genetic Testing  Genetic testing involves a blood test and will look at the genetic information in the MARIANN, BRCA1, BRCA2, BRIP1, CDH1, CHEK2, MLH1, MSH2, MSH6, PMS2, EPCAM, PTEN, PALB2, RAD51C, RAD51D, and TP53 genes for any harmful mutations that are associated with increased cancer risk.  If possible, it is recommended that the person(s) who has had cancer be tested before other family members.  That person will give us the most useful information about whether or not a specific gene is associated with the cancer in the family.    Results  There are three possible results of genetic testing:    Positive--a harmful mutation was identified in one or more of the genes    Negative--no mutation was identified in any of the genes on this panel    Variant of unknown significance--a variation in one of the genes was identified, but it is unclear how this impacts cancer risk in the family    Advantages and Disadvantages   There are advantages and  disadvantages to genetic testing.    Advantages    May clarify your cancer risk    Can help you make medical decisions    May explain the cancers in your family    May give useful information to your family members (if you share your results)    Disadvantages    Possible negative emotional impact of learning about inherited cancer risk    Uncertainty in interpreting a negative test result in some situations    Possible genetic discrimination concerns (see below)    Genetic Information Nondiscrimination Act (PATRICIA)  PATRICIA is a federal law that protects individuals from health insurance or employment discrimination based on a genetic test result alone.  Although rare, there are currently no legal discrimination protections in terms of life insurance, long term care, or disability insurances.  Visit the [employer] website to learn more.    Reducing Cancer Risk  All of the genes described above have nationally recognized cancer screening guidelines that would be recommended for individuals who test positive.  In addition to increased cancer screening, surgeries may be offered or recommended to reduce cancer risk.  Recommendations are based upon an individual s genetic test result as well as their personal and family history of cancer.    Questions to Think About Regarding Genetic Testing:    What effect will the test result have on me and my relationship with my family members if I have an inherited gene mutation?  If I don t have a gene mutation?    Should I share my test results, and how will my family react to this news, which may also affect them?    Are my children ready to learn new information that may one day affect their own health?    Hereditary Cancer Resources    FORCE: Facing Our Risk of Cancer Empowered facingourrisk.org   Bright Pink bebrightpink.org   Li-Fraumeni Syndrome Association lfsassociation.org   PTEN World PTENworld.com   No stomach for cancer, Inc.  nostomachforcancer.org   Stomach cancer relief network Scrnet.org   Collaborative Group of the Americas on Inherited Colorectal Cancer (CGA) cgaicc.com    Cancer Care cancercare.org   American Cancer Society (ACS) cancer.org   National Cancer Oblong (NCI) cancer.gov     Please call us if you have any questions or concerns.   Cancer Risk Management Program 2-200-2-[facility]-CANCER ([phone])  ? Nawaf Claros, MS, [facility] [phone]  ? Mago Grier, MS, [facility]  [phone]  ? Tatyana Sarkar, MS, [facility]  [phone]  ? Marzena Ruby, MS, [facility] [phone]  ? Shirlene Candi, MS, [facility] [phone]  ? Christine Corrales, MS, [facility]  [phone]  ? Mimi Rosenthal, MS, [facility]  [phone]    References  1. Keanu CALI, Bryan PDP, Leroy S, Suman CARPENTER, Zoltan JE, Breanna JL, Abraham N, Baldomero H, Tony O, Augustina A, Abini B, Radigillian P, Manlollykiadina S, Isabelle DM, Griffin N, Marcella E, Ирина H, Nelson E, Benjamin J, Gronlinda J, Ashley B, Yessicaus H, Thorlacius S, Eerola H, Nevadinalinna H, Didier K, Jw OP. Average risks of breast and ovarian cancer associated with BRCA1 or BRCA2 mutations detected in case series unselected for family history: a combined analysis of 222 studies. Am J Hum Buffy. 2003;72:1117-30.  2. Juan Carlos N, Ronit M, Solis G.  BRCA1 and BRCA2 Hereditary Breast and Ovarian Cancer. Gene Reviews online. 2013.  3. Arthur YC, Akash S, Helen G, Chatterjee S. Breast cancer risk among male BRCA1 and BRCA2 mutation carriers. J Natl Cancer Inst. 2007;99:1811-4.  4. Chriss DICK, Rita I, Jeff J, Giles E, Misty ER, Elvira F. Risk of breast cancer in male BRCA2 carriers. J Med Buffy. 2010;47:710-1.  5. National Comprehensive Cancer Network. Clinical practice guidelines in oncology, colorectal cancer screening. Available online (registration required). 2015.  6. Juan Manuel HARPER, Marilee J, Nallely J, Oswald LA, Amanda YORK, Sami C. Lifetime cancer risks in individuals with germline PTEN mutations. Clin Cancer Res. 2012;18:400-7.  7. Pilarski R. Cowden  Syndrome: A Critical Review of the Clinical Literature. J Buffy . 2009:18:13-27.  8. Addis A, Raad D, David S, Fransisca P, Nicole T, Zack M, Marshall B, Tucker H, Kristen R, Mahsa K, Susy L, Chriss DG, Isabelle D, Gabe DF, Nadia MR, The Breast Cancer Susceptibility Collaboration (UK) & Keith FLORES. MARIANN mutations that cause ataxia-telangiectasia are breast cancer susceptibility alleles. Nature Genetics. 2006;38:873-875  9. Prosper N , Koko Y, Lucy J, Nieves L, Taylor GM , Svetlana ML, Gallinger S, Monae AG, Syngal S, Nini ML, Raoul J , Akira R, Winifred SZ, Liz JR, Axel VE, Broderick M, Voodilon B, Teresita N, Christie RH, Everett KW, and Rula AP. MARIANN mutations in patients with hereditary pancreatic cancer. Cancer Discover. 2012;2:41-46  10. Keanu SHRESTHA, et al. Breast-Cancer Risk in Families with Mutations in PALB2. NEJM. 2014; 371(6):497-506.  11. CHEK2 Breast Cancer Case-Control Consortium. CHEK2*1100delC and susceptibility to breast cancer: A collaborative analysis involving 10,860 breast cancer cases and 9,065 controls from 10 studies. Am J Hum Buffy, 74 (2004), pp. 5750-4844  12. Herrera T, Dalia S, Peter K, et al. Spectrum of Mutations in BRCA1, BRCA2, CHEK2, and TP53 in Families at High Risk of Breast Cancer. SUSAN. 2006;295(12):1317-8972.   13. Alejandra C, Belkys D, Juliano A, et al. Risk of breast cancer in women with a CHEK2 mutation with and without a family history of breast cancer. J Clin Oncol. 2011;29:5182-8181.  14. Brice H, Sheela E, Leonel SJ, et al. Contribution of germline mutations in the RAD51B, RAD51C, and RAD51D genes to ovarian cancer in the population. J Clin Oncol. 2015;33(26):9069-7193. Doi:10.1200/JCO.2015.61.2408.  15. Colin T, Edwige HORN, Jose Armando P, et al. Mutations in BRIP1 confer high risk of ovarian cancer. Delmi Buffy. 2011;43(11):2851-6126. doi:10.1038/ng.955.

## 2019-12-18 NOTE — LETTER
Cancer Risk Management  Program Locations    [facility]  [facility]  [facility]  Mailing Address  Cancer Risk Management Program  [facility]  [address]  [place] [address]    New patient appointments  [phone]  December 18, 2019    Alyssa Manzano  [address] [facility] [address]      Dear Alyssa,    It was a pleasure meeting with you at the the Cancer Risk Management Program at the [facility] on 12/18/2019.  Here is a copy of the progress note from your recent genetic counseling visit to the Cancer Risk Management Program.  If you have any additional questions, please feel free to call.    Referring Provider: Lori Nina CNP    Presenting Information:   I met with Alyssa Manzano today for genetic counseling at the Cancer Risk Management Program at the [facility] to discuss her family history of cancer.  She is here today to review this history, cancer screening recommendations, and available genetic testing options.    Personal History:  Alyssa is a 38 year old female. She does not have any personal history of cancer.     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       She had her first menstrual period at age 15, her first child at age 30, and is premenopausal. Alyssa had salpingectomy for sterilization in 2014. Her ovaries and uterus are intact. She had a pelvic ultrasound on 12/2/15 that was normal. She reports that she has not used hormone replacement therapy or infertility medications. She is currently using oral contraceptives. She has not had any breast imaging or a colonoscopy due to her age. Alyssa reported tobacco use in the past for one year and alcohol use of approximately 2 drinks per week.    Family History:  (Please see scanned pedigree for detailed family history information)  Maternal:    Her mother is 69 years old with no known history of cancer.    Her maternal uncle was diagnosed with liver cancer at age 65 and passed away at age 65. He had no known history of exposures or smoking.     Her maternal grandfather was diagnosed with lung cancer at age 80 and passed away at age 81. He had a distant history of smoking and possible work-related exposures.  Paternal:    Her father was diagnosed with kidney cancer at age 70 and passed away recently at age 71. He also had a history of colon polyps. He may have had exposures in the [employer].    Her paternal uncle was diagnosed with lung cancer and passed away in his mid 60s. He had a distant history of smoking. He also had a history of colon polyps.     Her paternal grandmother was diagnosed with breast cancer in her 40s and had a lumpectomy. She was then diagnosed with breast cancer again at age 90. She also had a lump in her leg at age 90 which was reported to be a cancer separate from her breast cancer.       No known genetic testing in any of her family members.    Her maternal ethnicity is Naur[place]. Her paternal ethnicity is Polish. There is no known Ashkenazi Baptism ancestry on either side of her family.     Discussion:    Alyssa's family history of cancer is suggestive of a hereditary cancer syndrome.    We reviewed the features of sporadic, familial, and hereditary cancers. In looking at Alyssa's family history, it is possible that a cancer susceptibility gene is present due to her paternal grandmother's history of breast cancer in her 40s and again at age 90.    We discussed the BRCA1 and BRCA2 genes. Mutations in these genes cause a condition known as Hereditary Breast and Ovarian Cancer syndrome (HBOC). Women with a mutation in either of these genes are at increased risk for breast and ovarian cancer. There is also an increased risk for a second primary breast  cancer. Men with a mutation in either of these genes are at increased risk for breast and prostate cancer. Both women and men may also be at increased risk for pancreatic cancer and melanoma. A detailed handout regarding these genes and other genes in which mutations are associated with an increased risk for breast cancer was provided to Alyssa at the end of our appointment today and can be found in the after visit summary. Topics included: inheritance pattern, cancer risks, cancer screening recommendations, and also risks, benefits and limitations of testing.    Based on her personal and family history, Alyssa meets current National Comprehensive Cancer Network (NCCN) criteria for genetic testing of the BRCA1 and BRCA2 genes.      We discussed that there are additional genes that could cause increased risk for breast and other cancers. As many of these genes present with overlapping features in a family and accurate cancer risk cannot always be established based upon the pedigree analysis alone, it would be reasonable for Alyssa to consider panel genetic testing to analyze multiple genes at once.    We reviewed genetic testing options for hereditary breast and gynecologic cancers: actionable high/moderate breast and gynecologic cancer risk custom panel (CustomNext-Cancer, 19 genes, a combination of GynPlus and BRCAplus + NBN, STK11, and NF1), expanded high and moderate risk panel (OvaNext, 25 genes), or an expanded panel which includes additional genes associated with colon cancer, prostate cancer, and melanoma, among others (CancerNext, 34 genes). Alyssa expressed an interest in more broad testing.     Additionally, based on her father's history of kidney cancer and her paternal family history of colon polyps, we discussed the option of including additional genes in which mutations are associated with an increased risk for kidney cancer and colon polyps. Alyssa opted to add the RenalNext gene panel plus  three additional genes associated with an increased risk for colon polyps to her testing.  Genetic testing is available for 34 genes associated with hereditary cancer: CancerNext (APC, MARIANN, BARD1, BRCA1, BRCA2, BRIP1, BMPR1A, CDH1, CDK4, CDKN2A, CHEK2, DICER1, EPCAM, GREM1, HOXB13, MLH1, MRE11A, MSH2, MSH6, MUTYH, NBN, NF1, PALB2, PMS2, POLD1, POLE, PTEN, RAD50, RAD51C, RAD51D, SMAD4, SMARCA4, STK11, and TP53).  We discussed that many of the genes in the CancerNext panel are associated with specific hereditary cancer syndromes and published management guidelines: Hereditary Breast and Ovarian Cancer syndrome (BRCA1, BRCA2), Mcnulty syndrome (MLH1, MSH2, MSH6, PMS2, EPCAM), Familial Adenomatous Polyposis (APC), Hereditary Diffuse Gastric Cancer (CDH1), Familial Atypical Multiple Mole Melanoma syndrome (CDK4, CDKN2A), Juvenile Polyposis syndrome (BMPR1A, SMAD4), Cowden syndrome (PTEN), Li Fraumeni syndrome (TP53), Peutz-Jeghers syndrome (STK11), MUTYH Associated Polyposis (MUTYH), and Neurofibromatosis type 1 (NF1).   The MARIANN, BRIP1, CHEK2, GREM1, NBN, PALB2, POLD1, POLE, RAD51C, and RAD51D genes are associated with increased cancer risk and have published management guidelines for certain cancers.    The remaining genes (BARD1, DICER1, HOXB13, MRE11A, RAD50, and SMARCA4) are associated with increased cancer risk and may allow us to make medical recommendations when mutations are identified.    Alyssa was provided with a detailed brochure from [facility] explaining the CancerNext testing.   As described above, Alyssa opted to add the RenalNext gene panel plus three additional genes associated with an increased risk for colon polyps to her testing.  Consent was obtained and genetic testing for a CustomNext-Cancer panel (a combination of the CancerNext panel + RenalNext panel + AXIN2, MSH3, NTHL1) was sent to [facility]. Turn around time: 3-4 weeks.    Medical Management: For Alyssa, we reviewed  that the information from genetic testing may determine:    additional cancer screening for which Alyssa may qualify (i.e. mammogram and breast MRI, more frequent colonoscopies, more frequent dermatologic exams, etc.),    options for risk reducing surgeries Alyssa could consider (i.e. bilateral mastectomy, surgery to remove her ovaries and/or uterus, etc.),      and targeted chemotherapies if she were to develop certain cancers in the future (i.e. immunotherapy for individuals with Mcnulty syndrome, PARP inhibitors, etc.).     These recommendations will be discussed in detail once genetic testing is completed.     Plan:  1) Today Alyssa elected to proceed with genetic testing via a CustomNext-Cancer panel (a combination of the CancerNext panel + RenalNext panel + AXIN2, MSH3, NTHL1) offered by [facility].  2) This information should be available in 3-4 weeks.  3) Alyssa will receive a phone call to discuss the results.    Mimi Rosenthal MS, [facility]  Licensed Genetic Counselor  Office: [phone]

## 2020-01-29 LAB — LAB SCANNED RESULT: NORMAL

## 2020-02-06 ENCOUNTER — TELEPHONE (OUTPATIENT)
Dept: ONCOLOGY | Facility: CLINIC | Age: 40
End: 2020-02-06

## 2020-02-06 NOTE — TELEPHONE ENCOUNTER
Alyssa was scheduled for a telephone appointment to review her genetic test results today. I made multiple attempts to contact her by phone. Left a message letting her know that she can reach me at [phone] to review her genetic test results.     Mimi Rosenthal MS, [facility]  Licensed Genetic Counselor  Office: [phone]

## 2020-02-26 ENCOUNTER — TELEPHONE (OUTPATIENT)
Dept: ONCOLOGY | Facility: CLINIC | Age: 40
End: 2020-02-26

## 2020-02-26 NOTE — LETTER
Cancer Risk Management  Program [facility]  [facility]  [facility]  Mailing Address  Cancer Risk Management Program  [facility]  [address]  [place] [address]    New patient appointments  [phone]  March 2, 2020    Alyssa Manzano  [address]   [place] [address]    Dear Alyssa,    It was a pleasure speaking with you on the phone on 2/26/2020. Here is a copy of the progress note from our discussion. If you have any additional questions, please feel free to call.    Referring Provider: Lori Nina CNP    Presenting Information:  I spoke to Alyssa by phone today to discuss her genetic testing results. Her blood was drawn on 12/18/2019. A CustomNext-Cancer panel (a combination of the CancerNext panel + RenalNext panel + AXIN2, MSH3, NTHL1) was ordered from [employer]. This testing was done because of Alyssa's family history of cancer.    Genetic Testing Result: NEGATIVE  Alyssa is negative for mutations in the 49 genes analyzed: APC, MARIANN, AXIN2, BAP1, BARD1, BMPR1A, BRCA1, BRCA2, BRIP1, CDH1, CDK4, CDKN2A, CHEK2, DICER1, FH, FLCN, HOXB13, MET, MLH1, MRE11A, MSH2, MSH3, MSH6, MUTYH, NBN, NF1, NTHL1, PALB2, PMS2, POLD1, POLE, PTEN, RAD50, RAD51C, RAD51D, SDHA, SDHB, SDHC, SDHD, SMAD4, SMARCA4, STK11, TP53, TSC1, TSC2 and VHL (sequencing and deletion/duplication); MITF (sequencing only); EPCAM and GREM1 (deletion/duplication only).       Interpretation:  We discussed several different interpretations of this negative test result.    1. One explanation may be that there is a different gene or combination of genes and environment that are associated with the cancers in this family.  2. It is possible that her relatives have a mutation in one of these genes and she did not inherit  it.  3. There is also a small possibility that there is a mutation in one of these genes, and the testing laboratory could not find it with their current testing methods.       Screening:  Based on this negative test result, it is important for Alyssa and her relatives to refer back to the family history for appropriate cancer screening.      Based on the personal and family history information she provided, Alyssa does not meet current National Comprehensive Cancer Network (NCCN) guidelines for high risk breast screening based on the MONSTER Risk Evaluator v8 model. As such, Alyssa should continue with her routine breast imaging. In addition, Alyssa should be receiving clinical breast exams by her physician. Breast cancer screening is generally recommended to begin approximately 10 years younger than the earliest age of breast cancer diagnosis in the family, or at age 40, whichever comes first. She reported that her paternal grandmother was first diagnosed with breast cancer in her 40s. We discussed that she should speak with her family members about the exact age that she was diagnosed with breast cancer, as this would dictate when Alyssa should begin her own breast cancer screening.     Alyssa should discuss her family history of colon polyps with her physicians. She should also continue with her gynecologic exams and other population cancer screening options, such as those recommended by the American Cancer Society and the National Comprehensive Cancer Network (NCCN). These screening recommendations may change if there are changes to Alyssa's personal and/or family history of cancer. Final screening recommendations should be made by each individual's managing physician.    Inheritance:  We reviewed the autosomal dominant inheritance of mutations in these genes. We discussed that Alyssa cannot/did not pass on an identifiable mutation in these genes to her children based on this test result.   Mutations in these genes do not skip generations.      Additional Testing Considerations:  Although Alyssa's genetic testing result was negative, other relatives may still carry a gene mutation associated with an increased risk for cancer. Genetic counseling is recommended for her sisters and paternal relatives to discuss genetic testing options. If any of these relatives do pursue genetic testing, Alyssa is encouraged to contact me so that we may review the impact of their test results on her.    Summary:  We do not have an explanation for Alyssa's family history of cancer. While no genetic changes were identified, Alyssa may still be at risk for certain cancers due to family history, environmental factors, or other genetic causes not identified by this test.  Because of that, it is important that she continue with cancer screening based on her personal and family history as discussed above.    Genetic testing is rapidly advancing, and new cancer susceptibility genes will most likely be identified in the future.  Therefore, I encouraged Alyssa to contact me annually or if there are changes in her personal or family history.  This may change how we assess her cancer risk, screening, and the testing we would offer.    Plan:  1. A copy of the test results will be mailed to Alyssa.  2. She plans to follow-up with her physicians.  3. She should contact me annually, or sooner if her family history changes.    If Alyssa has any further questions, I encouraged her to contact me at [phone].    Mimi Rsoenthal MS, [facility]  Licensed Genetic Counselor  Office: [phone]

## 2020-02-26 NOTE — TELEPHONE ENCOUNTER
"2/26/2020    Referring Provider: Lori Nina CNP    Presenting Information:  I spoke to Alyssa by phone today to discuss her genetic testing results. Her blood was drawn on 12/18/2019. A CustomNext-Cancer panel (a combination of the CancerNext panel + RenalNext panel + AXIN2, MSH3, NTHL1) was ordered from [employer]. This testing was done because of Alyssa's family history of cancer.    Genetic Testing Result: NEGATIVE  Alyssa is negative for mutations in the 49 genes analyzed: APC, MARIANN, AXIN2, BAP1, BARD1, BMPR1A, BRCA1, BRCA2, BRIP1, CDH1, CDK4, CDKN2A, CHEK2, DICER1, FH, FLCN, HOXB13, MET, MLH1, MRE11A, MSH2, MSH3, MSH6, MUTYH, NBN, NF1, NTHL1, PALB2, PMS2, POLD1, POLE, PTEN, RAD50, RAD51C, RAD51D, SDHA, SDHB, SDHC, SDHD, SMAD4, SMARCA4, STK11, TP53, TSC1, TSC2 and VHL (sequencing and deletion/duplication); MITF (sequencing only); EPCAM and GREM1 (deletion/duplication only).       A copy of the test report can be found in the Laboratory tab, dated 12/18/19, and named \"SEND OUTS MISC TEST\". The report is scanned in as a linked document.    Interpretation:  We discussed several different interpretations of this negative test result.    1. One explanation may be that there is a different gene or combination of genes and environment that are associated with the cancers in this family.  2. It is possible that her relatives have a mutation in one of these genes and she did not inherit it.  3. There is also a small possibility that there is a mutation in one of these genes, and the testing laboratory could not find it with their current testing methods.       Screening:  Based on this negative test result, it is important for Alyssa and her relatives to refer back to the family history for appropriate cancer screening.      Based on the personal and family history information she provided, Alyssa does not meet current National Comprehensive Cancer Network (NCCN) guidelines for high risk breast " screening based on the MONSTER Risk Evaluator v8 model. As such, Alyssa should continue with her routine breast imaging. In addition, Alyssa should be receiving clinical breast exams by her physician. Breast cancer screening is generally recommended to begin approximately 10 years younger than the earliest age of breast cancer diagnosis in the family, or at age 40, whichever comes first. She reported that her paternal grandmother was first diagnosed with breast cancer in her 40s. We discussed that she should speak with her family members about the exact age that she was diagnosed with breast cancer, as this would dictate when Alyssa should begin her own breast cancer screening.     Alyssa should discuss her family history of colon polyps with her physicians. She should also continue with her gynecologic exams and other population cancer screening options, such as those recommended by the American Cancer Society and the National Comprehensive Cancer Network (NCCN). These screening recommendations may change if there are changes to Alyssa's personal and/or family history of cancer. Final screening recommendations should be made by each individual's managing physician.    Inheritance:  We reviewed the autosomal dominant inheritance of mutations in these genes. We discussed that Alyssa cannot/did not pass on an identifiable mutation in these genes to her children based on this test result.  Mutations in these genes do not skip generations.      Additional Testing Considerations:  Although Alyssa's genetic testing result was negative, other relatives may still carry a gene mutation associated with an increased risk for cancer. Genetic counseling is recommended for her sisters and paternal relatives to discuss genetic testing options. If any of these relatives do pursue genetic testing, Alyssa is encouraged to contact me so that we may review the impact of their test results on her.    Summary:  We do not  have an explanation for Alyssa's family history of cancer. While no genetic changes were identified, Alyssa may still be at risk for certain cancers due to family history, environmental factors, or other genetic causes not identified by this test.  Because of that, it is important that she continue with cancer screening based on her personal and family history as discussed above.    Genetic testing is rapidly advancing, and new cancer susceptibility genes will most likely be identified in the future.  Therefore, I encouraged Alyssa to contact me annually or if there are changes in her personal or family history.  This may change how we assess her cancer risk, screening, and the testing we would offer.    Plan:  1. A copy of the test results will be mailed to Alyssa.  2. She plans to follow-up with her physicians.  3. She should contact me annually, or sooner if her family history changes.    If Alyssa has any further questions, I encouraged her to contact me at [phone].    Time spent on the phone: 5 minutes.    Mimi Rosenthal MS, [facility]  Licensed Genetic Counselor  Office: [phone]

## 2020-02-26 NOTE — Clinical Note
Please send copy of letter to patient with test results. Please enclose test results: Send outs misc test [[id number]] (Order [id number])

## 2020-06-12 DIAGNOSIS — D70.8 AUTOIMMUNE NEUTROPENIA (H): ICD-10-CM

## 2020-06-12 LAB
BASOPHILS # BLD AUTO: 0 10E9/L (ref 0–0.2)
BASOPHILS NFR BLD AUTO: 0.5 %
DIFFERENTIAL METHOD BLD: NORMAL
EOSINOPHIL # BLD AUTO: 0.1 10E9/L (ref 0–0.7)
EOSINOPHIL NFR BLD AUTO: 1.1 %
ERYTHROCYTE [DISTWIDTH] IN BLOOD BY AUTOMATED COUNT: 12.5 % (ref 10–15)
HCT VFR BLD AUTO: 37.6 % (ref 35–47)
HGB BLD-MCNC: 12.7 G/DL (ref 11.7–15.7)
LYMPHOCYTES # BLD AUTO: 1.2 10E9/L (ref 0.8–5.3)
LYMPHOCYTES NFR BLD AUTO: 27.5 %
MCH RBC QN AUTO: 32.4 PG (ref 26.5–33)
MCHC RBC AUTO-ENTMCNC: 33.8 G/DL (ref 31.5–36.5)
MCV RBC AUTO: 96 FL (ref 78–100)
MONOCYTES # BLD AUTO: 0.4 10E9/L (ref 0–1.3)
MONOCYTES NFR BLD AUTO: 8.3 %
NEUTROPHILS # BLD AUTO: 2.7 10E9/L (ref 1.6–8.3)
NEUTROPHILS NFR BLD AUTO: 62.6 %
PLATELET # BLD AUTO: 227 10E9/L (ref 150–450)
RBC # BLD AUTO: 3.92 10E12/L (ref 3.8–5.2)
WBC # BLD AUTO: 4.4 10E9/L (ref 4–11)

## 2020-06-12 PROCEDURE — 85025 COMPLETE CBC W/AUTO DIFF WBC: CPT | Performed by: FAMILY MEDICINE

## 2020-06-12 PROCEDURE — 36415 COLL VENOUS BLD VENIPUNCTURE: CPT | Performed by: FAMILY MEDICINE

## 2020-06-16 ENCOUNTER — VIRTUAL VISIT (OUTPATIENT)
Dept: ONCOLOGY | Facility: CLINIC | Age: 40
End: 2020-06-16
Attending: INTERNAL MEDICINE
Payer: COMMERCIAL

## 2020-06-16 DIAGNOSIS — D70.8 OTHER NEUTROPENIA (H): ICD-10-CM

## 2020-06-16 DIAGNOSIS — D70.8 AUTOIMMUNE NEUTROPENIA (H): ICD-10-CM

## 2020-06-16 DIAGNOSIS — D70.8 AUTOIMMUNE NEUTROPENIA (H): Primary | ICD-10-CM

## 2020-06-16 PROCEDURE — 99213 OFFICE O/P EST LOW 20 MIN: CPT | Mod: 95 | Performed by: INTERNAL MEDICINE

## 2020-06-16 NOTE — LETTER
"    6/16/2020         RE: Alyssa Manzano  [address] [address]        Dear Colleague,    Thank you for referring your patient, Alyssa Manzano, to the [facility]. Please see a copy of my visit note below.    Alyssa Manzano is a 39 year old female who is being evaluated via a billable video visit.      The patient has been notified of following:     \"This video visit will be conducted via a call between you and your physician/provider. We have found that certain health care needs can be provided without the need for an in-person physical exam.  This service lets us provide the care you need with a video conversation.  If a prescription is necessary we can send it directly to your pharmacy.  If lab work is needed we can place an order for that and you can then stop by our lab to have the test done at a later time.    Video visits are billed at different rates depending on your insurance coverage.  Please reach out to your insurance provider with any questions.    If during the course of the call the physician/provider feels a video visit is not appropriate, you will not be charged for this service.\"    Patient has given verbal consent for Video visit?yes  Will anyone else be joining your video visit?no        Video-Visit Details    Type of service:  Video Visit    Video visit time: 14 min  Originating Location (pt. Location): home    Distant Location (provider location):  [facility]     Platform used for Video Visit: KRISTY Nichole MD, MD          Hematology Follow-up Visit:  Jun 16, 2020      Referring Provider: Tawana Handley NP  OB/GYN: Dr. Jackson    Diagnosis: Autoimmune Neutropenia  -    Hematologic History:   Neutropenia dates back at least to March 2007 when her ANC measured 1.2. Hemoglobin and platelet count were normal. At that time, she saw Dr. Abraham Ybarra for a hematology consultation, had an unremarkable peripheral blood smear, and " was felt to have benign neutropenia. No treatment recommendations were made at that time.   Since then, she was noted to have severe neutropenia on several occasions. Her hematologic work-up for neutropenia was essentially negative with complete metabolic panel normal. Vitamin B12, folic acid, TSH, Hep B panel, Hep C, and HIV antibody unremarkable. CAT negative. Immunoglobulins revealed low normal IgG level at 690 mg/dL (normal 695-1620 mg/dL). IgA and IgM were normal. RA factor slightly elevated at 20 IU/mL. Peripheral blood smear revealed slight normochromic normocytic anemia and marked leukopenia due to neutropenia. Spleen was mildly enlarged on an US and rheumatoid factor was positive and she was seen by Dr. Solis but he did not feel that she had Felty's syndrome or a clinically significant connective tissue disorder.     We decided to proceed with a bone marrow biopsy for further evaluation. BMBX on 6/8/2011 showed:    Marrow cellularity of 50-60% with trilineage hematopoietic maturation  Left-shifted but complete granulocytic maturation  No increase in blasts; no morphologic evidence of dysplasia    Flow Cytometry showed:  Polytypic B lymphocytes  No aberrant immunophenotype on T lymphocytes  No increase in blasts  Cytogenetics showed:  46,XX[20]    No clonal chromosomal abnormality was detected among the 20 metaphase cells analyzed. Thus, no cytogenetic evidence of a malignant process was found.    She had antigranulocyte antibodies detected in her blood.   She has two daughters, the youngest is 3 years old and with both of her pregnancies her neutropenia has responded to PO prednisone 20 mg orally daily in the last month before delivery. She has three children at home.    Interval History:  Alyssa is a 39 year old female diagnosed with autoimmune neutropenia present today for follow-up.   She has remained on  Neupogen 480 mcg twice a week prophylactically. Her injections have been uneventful. Last  injection of Neupogen was on 6/10 and anc on 6/12 was 2.7. She usually injects Neupogen at home on Mondays and Thursdays. If she misses doses she notes new mouth sores. She has not had any infections in the last year, except for occasional mouth sores when she does not adhere to twice a week schedule, but she has been better about adhering to twice a week Neupogen this year especially in view of COVID-19 pandemic.  She has had no fevers and no s/o infection- no cough, diarrhea, dysuria.  She was seen by rheumatologist Dr. Solis in the past (about 5 years ago per patient) but he did not feel that she had Felty's syndrome or a clinically significant connective tissue disorder.   She met with  and proceeded with CustomNext-Cancer panel (a combination of the CancerNext panel + RenalNext panel + AXIN2, MSH3, NTHL1) offered by [employer].  Alyssa is negative for mutations in the 49 genes analyzed: APC, MARIANN, AXIN2, BAP1, BARD1, BMPR1A, BRCA1, BRCA2, BRIP1, CDH1, CDK4, CDKN2A, CHEK2, DICER1, FH, FLCN, HOXB13, MET, MLH1, MRE11A, MSH2, MSH3, MSH6, MUTYH, NBN, NF1, NTHL1, PALB2, PMS2, POLD1, POLE, PTEN, RAD50, RAD51C, RAD51D, SDHA, SDHB, SDHC, SDHD, SMAD4, SMARCA4, STK11, TP53, TSC1, TSC2 and VHL (sequencing and deletion/duplication); MITF (sequencing only); EPCAM and GREM1 (deletion/duplication only).    In addition, a complete 12 point  review of systems is negative.      Past medical, social, surgical, and family histories reviewed.  Family History:   Maternal:    Her mother is 69 years old with no known history of cancer.    Her maternal uncle was diagnosed with liver cancer at age 65 and passed away at age 65. He had no known history of exposures or smoking.     Her maternal grandfather was diagnosed with lung cancer at age 80 and passed away at age 81. He had a distant history of smoking and possible work-related exposures.  Paternal:    Her father was diagnosed with kidney cancer at age 70 and passed away  recently at age 71. He also had a history of colon polyps. He may have had exposures in the [employer].    Her paternal uncle was diagnosed with lung cancer and passed away in his mid 60s. He had a distant history of smoking. He also had a history of colon polyps.     Her paternal grandmother was diagnosed with breast cancer in her 40s and had a lumpectomy. She was then diagnosed with breast cancer again at age 90. She also had a lump in her leg at age 90 which was reported to be a cancer separate from her breast cancer.   She has no family history of hematologic disorders or autoimmune disease.    Allergies:  Allergies as of 01/24/2013 - reviewed 01/24/2013    Allergen  Reaction  Noted       No known drug allergies    04/25/2011          Current Medications:  Current Outpatient Medications   Medication     DASETTA 1/35 1-35 MG-MCG tablet     filgrastim-sndz (ZARXIO) 480 MCG/0.8ML syringe     Multiple Vitamins-Minerals (MULTIVITAMIN ADULT PO)     traZODone (DESYREL) 50 MG tablet     No current facility-administered medications for this visit.          Physical Exam:  Constitutional: alert and in no distress  Eyes: No redness or discharge  Respiratory: No cough or labored breathing.  Musculoskeletal: Full range of motion in extremities.  Skin: no visible skin lesions or discoloration  Neurological: No tremors and denies headache.  Psychiatric: Mentation appears normal and affect is normal as well.  Alert and oriented x3.  The rest the comprehensive physical examination is deferred due to public health emergency video visit restrictions.    Laboratory Studies:  Lab Results   Component Value Date    WBC 4.4 06/12/2020     Lab Results   Component Value Date    RBC 3.92 06/12/2020     Lab Results   Component Value Date    HGB 12.7 06/12/2020     Lab Results   Component Value Date    HCT 37.6 06/12/2020     No components found for: MCT  Lab Results   Component Value Date    MCV 96 06/12/2020     Lab Results   Component Value  Date    MCH 32.4 06/12/2020     Lab Results   Component Value Date    MCHC 33.8 06/12/2020     Lab Results   Component Value Date    RDW 12.5 06/12/2020     Lab Results   Component Value Date     06/12/2020     ANC 2.7  ASSESSMENT/PLAN:  Alyssa is a radha 39 year old woman with 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  1. Neutropenia, autoimmune, with presence of antigranulocyte antibodies.   ANC normal on Neupogen  480mcg twice a week. Rx renewed for one year. Goal micaela ANC of >1.0 where usually no serious infections occur and oral aphthae disappear. F/up  in one year with cbcd.  Continue with annual influenza vaccinations in the fall.    At the end of the visit the patient verbalized understanding, denied any further questions, and concurred with the plan of care.         Again, thank you for allowing me to participate in the care of your patient.        Sincerely,        Constance Nichole MD, MD

## 2020-06-16 NOTE — PROGRESS NOTES
"Alyssa Manzano is a 39 year old female who is being evaluated via a billable video visit.      The patient has been notified of following:     \"This video visit will be conducted via a call between you and your physician/provider. We have found that certain health care needs can be provided without the need for an in-person physical exam.  This service lets us provide the care you need with a video conversation.  If a prescription is necessary we can send it directly to your pharmacy.  If lab work is needed we can place an order for that and you can then stop by our lab to have the test done at a later time.    Video visits are billed at different rates depending on your insurance coverage.  Please reach out to your insurance provider with any questions.    If during the course of the call the physician/provider feels a video visit is not appropriate, you will not be charged for this service.\"    Patient has given verbal consent for Video visit?yes  Will anyone else be joining your video visit?no        Video-Visit Details    Type of service:  Video Visit    Video visit time: 14 min  Originating Location (pt. Location): home    Distant Location (provider location):  [facility]     Platform used for Video Visit: Pager  Constance Nichole MD, MD          Hematology Follow-up Visit:  Jun 16, 2020      Referring Provider: Tawana Handley NP  OB/GYN: Dr. Jackson    Diagnosis: Autoimmune Neutropenia  -    Hematologic History:   Neutropenia dates back at least to March 2007 when her ANC measured 1.2. Hemoglobin and platelet count were normal. At that time, she saw Dr. Abraham Ybarra for a hematology consultation, had an unremarkable peripheral blood smear, and was felt to have benign neutropenia. No treatment recommendations were made at that time.   Since then, she was noted to have severe neutropenia on several occasions. Her hematologic work-up for neutropenia was essentially negative with complete " metabolic panel normal. Vitamin B12, folic acid, TSH, Hep B panel, Hep C, and HIV antibody unremarkable. CAT negative. Immunoglobulins revealed low normal IgG level at 690 mg/dL (normal 695-1620 mg/dL). IgA and IgM were normal. RA factor slightly elevated at 20 IU/mL. Peripheral blood smear revealed slight normochromic normocytic anemia and marked leukopenia due to neutropenia. Spleen was mildly enlarged on an US and rheumatoid factor was positive and she was seen by Dr. Solis but he did not feel that she had Felty's syndrome or a clinically significant connective tissue disorder.     We decided to proceed with a bone marrow biopsy for further evaluation. BMBX on 6/8/2011 showed:    Marrow cellularity of 50-60% with trilineage hematopoietic maturation  Left-shifted but complete granulocytic maturation  No increase in blasts; no morphologic evidence of dysplasia    Flow Cytometry showed:  Polytypic B lymphocytes  No aberrant immunophenotype on T lymphocytes  No increase in blasts  Cytogenetics showed:  46,XX[20]    No clonal chromosomal abnormality was detected among the 20 metaphase cells analyzed. Thus, no cytogenetic evidence of a malignant process was found.    She had antigranulocyte antibodies detected in her blood.   She has two daughters, the youngest is 3 years old and with both of her pregnancies her neutropenia has responded to PO prednisone 20 mg orally daily in the last month before delivery. She has three children at home.    Interval History:  Alyssa is a 39 year old female diagnosed with autoimmune neutropenia present today for follow-up.   She has remained on  Neupogen 480 mcg twice a week prophylactically. Her injections have been uneventful. Last injection of Neupogen was on 6/10 and anc on 6/12 was 2.7. She usually injects Neupogen at home on Mondays and Thursdays. If she misses doses she notes new mouth sores. She has not had any infections in the last year, except for occasional mouth sores  when she does not adhere to twice a week schedule, but she has been better about adhering to twice a week Neupogen this year especially in view of COVID-19 pandemic.  She has had no fevers and no s/o infection- no cough, diarrhea, dysuria.  She was seen by rheumatologist Dr. Solis in the past (about 5 years ago per patient) but he did not feel that she had Felty's syndrome or a clinically significant connective tissue disorder.   She met with GC and proceeded with CustomNext-Cancer panel (a combination of the CancerNext panel + RenalNext panel + AXIN2, MSH3, NTHL1) offered by [employer].  Alyssa is negative for mutations in the 49 genes analyzed: APC, MARIANN, AXIN2, BAP1, BARD1, BMPR1A, BRCA1, BRCA2, BRIP1, CDH1, CDK4, CDKN2A, CHEK2, DICER1, FH, FLCN, HOXB13, MET, MLH1, MRE11A, MSH2, MSH3, MSH6, MUTYH, NBN, NF1, NTHL1, PALB2, PMS2, POLD1, POLE, PTEN, RAD50, RAD51C, RAD51D, SDHA, SDHB, SDHC, SDHD, SMAD4, SMARCA4, STK11, TP53, TSC1, TSC2 and VHL (sequencing and deletion/duplication); MITF (sequencing only); EPCAM and GREM1 (deletion/duplication only).    In addition, a complete 12 point  review of systems is negative.      Past medical, social, surgical, and family histories reviewed.  Family History:   Maternal:    Her mother is 69 years old with no known history of cancer.    Her maternal uncle was diagnosed with liver cancer at age 65 and passed away at age 65. He had no known history of exposures or smoking.     Her maternal grandfather was diagnosed with lung cancer at age 80 and passed away at age 81. He had a distant history of smoking and possible work-related exposures.  Paternal:    Her father was diagnosed with kidney cancer at age 70 and passed away recently at age 71. He also had a history of colon polyps. He may have had exposures in the [employer].    Her paternal uncle was diagnosed with lung cancer and passed away in his mid 60s. He had a distant history of smoking. He also had a history of colon polyps.      Her paternal grandmother was diagnosed with breast cancer in her 40s and had a lumpectomy. She was then diagnosed with breast cancer again at age 90. She also had a lump in her leg at age 90 which was reported to be a cancer separate from her breast cancer.   She has no family history of hematologic disorders or autoimmune disease.    Allergies:  Allergies as of 01/24/2013 - reviewed 01/24/2013    Allergen  Reaction  Noted       No known drug allergies    04/25/2011          Current Medications:  Current Outpatient Medications   Medication     DASETTA 1/35 1-35 MG-MCG tablet     filgrastim-sndz (ZARXIO) 480 MCG/0.8ML syringe     Multiple Vitamins-Minerals (MULTIVITAMIN ADULT PO)     traZODone (DESYREL) 50 MG tablet     No current facility-administered medications for this visit.          Physical Exam:  Constitutional: alert and in no distress  Eyes: No redness or discharge  Respiratory: No cough or labored breathing.  Musculoskeletal: Full range of motion in extremities.  Skin: no visible skin lesions or discoloration  Neurological: No tremors and denies headache.  Psychiatric: Mentation appears normal and affect is normal as well.  Alert and oriented x3.  The rest the comprehensive physical examination is deferred due to public health emergency video visit restrictions.    Laboratory Studies:  Lab Results   Component Value Date    WBC 4.4 06/12/2020     Lab Results   Component Value Date    RBC 3.92 06/12/2020     Lab Results   Component Value Date    HGB 12.7 06/12/2020     Lab Results   Component Value Date    HCT 37.6 06/12/2020     No components found for: MCT  Lab Results   Component Value Date    MCV 96 06/12/2020     Lab Results   Component Value Date    MCH 32.4 06/12/2020     Lab Results   Component Value Date    MCHC 33.8 06/12/2020     Lab Results   Component Value Date    RDW 12.5 06/12/2020     Lab Results   Component Value Date     06/12/2020     ANC 2.7  ASSESSMENT/PLAN:  Alyssa viera  radha 39 year old woman with 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  1. Neutropenia, autoimmune, with presence of antigranulocyte antibodies.   ANC normal on Neupogen  480mcg twice a week. Rx renewed for one year. Goal micaela ANC of >1.0 where usually no serious infections occur and oral aphthae disappear. F/up  in one year with cbcd.  Continue with annual influenza vaccinations in the fall.    At the end of the visit the patient verbalized understanding, denied any further questions, and concurred with the plan of care.

## 2020-06-18 NOTE — TELEPHONE ENCOUNTER
I don't have labs form 2 weeks ago- Call pt. To investigate please.   Labs were not forwarded if referring to urine. Pt. Needs to fill out ZARINA if wanting this done as looks like outside [facility]. Tawana Handley    Paramedian Forehead Flap Text: A decision was made to reconstruct the defect utilizing an interpolation axial flap and a staged reconstruction.  A telfa template was made of the defect.  This telfa template was then used to outline the paramedian forehead pedicle flap.  The donor area for the pedicle flap was then injected with anesthesia.  The flap was excised through the skin and subcutaneous tissue down to the layer of the underlying musculature.  The pedicle flap was carefully excised within this deep plane to maintain its blood supply.  The edges of the donor site were undermined.   The donor site was closed in a primary fashion.  The pedicle was then rotated into position and sutured.  Once the tube was sutured into place, adequate blood supply was confirmed with blanching and refill.  The pedicle was then wrapped with xeroform gauze and dressed appropriately with a telfa and gauze bandage to ensure continued blood supply and protect the attached pedicle.

## 2020-06-26 DIAGNOSIS — D70.8 OTHER NEUTROPENIA (H): ICD-10-CM

## 2020-09-23 ENCOUNTER — TELEPHONE (OUTPATIENT)
Dept: ONCOLOGY | Facility: CLINIC | Age: 40
End: 2020-09-23

## 2020-09-23 NOTE — TELEPHONE ENCOUNTER
Left vm for patient. Clinic received fax from [facility] stating they had made several attempts to contact patient to fill her Zarxio. Calling to see if patient still doing injections and if she has enough medication. Callback number for writer provided.  Flower Hilton  RN, BSN, OCN

## 2020-09-28 ENCOUNTER — TELEPHONE (OUTPATIENT)
Dept: FAMILY MEDICINE | Facility: CLINIC | Age: 40
End: 2020-09-28

## 2020-09-28 NOTE — TELEPHONE ENCOUNTER
Notified patient and offered to schedule with another provider and she stated she was [facility] going [facility] for now.

## 2020-09-28 NOTE — TELEPHONE ENCOUNTER
Reason for Call:  Same Day Appointment, Requested Provider:  Tawana Handley DNP, FNP-BC    PCP: Tawana Handley    Reason for visit: back pain    Duration of symptoms: little over a week    Have you been treated for this in the past? No    Additional comments: patient would like to get worked in this week for her back pain.  Please call    Can we leave a detailed message on this number? YES    Phone number patient can be reached at: Home number on file [phone] (home)    Best Time: any    Call taken on 9/28/2020 at 12:31 PM by Vera Light

## 2020-10-25 DIAGNOSIS — Z30.41 ENCOUNTER FOR SURVEILLANCE OF CONTRACEPTIVE PILLS: ICD-10-CM

## 2020-10-25 RX ORDER — NORETHINDRONE AND ETHINYL ESTRADIOL 1 MG-35MCG
KIT ORAL
Qty: 112 TABLET | Refills: 0 | Status: SHIPPED | OUTPATIENT
Start: 2020-10-25 | End: 2020-11-23

## 2020-10-25 NOTE — TELEPHONE ENCOUNTER
Pending Prescriptions:                       Disp   Refills    DASETTA 1/35 1-35 MG-MCG tablet [Pharmacy*112 ta*0            Sig: TAKE ONE TABLET BY MOUTH ONCE DAILY CONTINUOUSLY    Medication is being filled for 1 time yovany refill only due to:  Patient is due for annual    Please call and help schedule.  Thank you!    Gail Zhu RN

## 2020-10-25 NOTE — LETTER
[facility]  [address], [address]  [place] [address]  Phone: [phone]  Fax: [phone]        November 5, 2020      Alyssa Manzano                                                                                                                                [address] [facility] [address] [address]            Dear Ms. Manzano,    We are concerned about your health care.  We recently provided you with a medication refill.  Many medications require routine follow-up with your Doctor.      At this time we ask that: You schedule an appointment for your annual physical.    Your prescription: Has been refilled for 1 month so you may have time for the above noted follow-up.      Thank you,      Tawana Handley, CNP

## 2020-11-19 NOTE — PROGRESS NOTES
"   SUBJECTIVE:   CC: Alyssa Manzano is an 39 year old woman who presents for preventive health visit.       Patient has been advised of split billing requirements and indicates understanding: Yes  Healthy Habits:     Getting at least 3 servings of Calcium per day:  NO    Bi-annual eye exam:  NO    Dental care twice a year:  Yes    Sleep apnea or symptoms of sleep apnea:  None    Diet:  Regular (no restrictions)    Frequency of exercise:  None    Taking medications regularly:  Yes    Medication side effects:  None    PHQ-2 Total Score: 4    Additional concerns today:  No    Feels as if she has been depressed. Is immunocompromised and lives alone. COVID-19 has been hard and she feels isolated. Also lost her dad one year ago from renal cancer. Does have 3 children, ages 9, 7, and 6 who she has with her half of the time, split between her ex-    Feels like she doesn't have much of an appetite. Also has trouble sleeping. Since her dad passed away, has relied on 10 mg melatonin nightly and also takes Zzz Quil OTC. When she doesn't take these medications she is unable to sleep and lays awake all night.  Sleeps 6-8 hours per night with sleep aids.    Works at home as a . Has not been exercising.   Usually goes all day without eating and then eats \"junk\" in the evening, whatever is in her cupboards.     Sister has depression and dad might have she thinks. Mom has been on medications since her dad passed and it has been working for her. Her mom takes Celexa.    Is dating. Not concerned about STD's.   Is on OCPs and skips placebo weeks. When she does take placebo week pills, doesn't have a period, usually does this once every 6 months. Last normal period was one year ago.    Paternal grandmother had breast cancer twice. She did have genetic testing done which was negative for cancer gene.    Has been having joint pain and muscle aches, mainly in her hands. Also notices pain in her hips and legs " especially after sexual activity. Hasn't tried any treatments for this.    PHQ-9 score of 18 today. No SI    Today's PHQ-2 Score:   PHQ-2 (  [employer]) 2020   Q1: Little interest or pleasure in doing things 3   Q2: Feeling down, depressed or hopeless 1   PHQ-2 Score 4   Q1: Little interest or pleasure in doing things Nearly every day   Q2: Feeling down, depressed or hopeless Several days   PHQ-2 Score 4       Abuse: Current or Past (Physical, Sexual or Emotional) - No  Do you feel safe in your environment? Yes        Social History     Tobacco Use     Smoking status: Former Smoker     Packs/day: 0.00     Years: 1.00     Pack years: 0.00     Types: Cigarettes     Smokeless tobacco: Never Used   Substance Use Topics     Alcohol use: Yes     Comment: 4 drinks per week         Alcohol Use 2020   Prescreen: >3 drinks/day or >7 drinks/week? No   Prescreen: >3 drinks/day or >7 drinks/week? -       Reviewed orders with patient.  Reviewed health maintenance and updated orders accordingly - Yes  BP Readings from Last 3 Encounters:   20 120/68   10/31/19 122/70   19 100/62    Wt Readings from Last 3 Encounters:   20 69.4 kg (153 lb)   10/31/19 66.7 kg (147 lb 1.6 oz)   19 65.3 kg (144 lb)                  Patient Active Problem List   Diagnosis     Chronic rhinitis     Abnormal Pap smear, low grade squamous intraepithelial lesion (LGSIL)     CARDIOVASCULAR SCREENING; LDL GOAL LESS THAN 160     Urticaria     Autoimmune neutropenia (H)     Generalized anxiety disorder     Excessive or frequent menstruation     Intermittent asthma, uncomplicated     Umbilical hernia without obstruction and without gangrene     Bilateral inguinal hernia without obstruction or gangrene, recurrence not specified     Diastasis recti     Encounter for sterilization     Incisional hernia, without obstruction or gangrene     SBO (small bowel obstruction) (H)     Status post repeat low transverse  section      Past Surgical History:   Procedure Laterality Date     ABDOMEN SURGERY  2014    hernia repair open     BIOPSY      bone marrow     C/SECTION, LOW TRANSVERSE  ,      LAPAROSCOPIC HERNIORRHAPHY UMBILICAL N/A 3/31/2015    Procedure: LAPAROSCOPIC HERNIORRHAPHY UMBILICAL;  Surgeon: Bigg Mirza MD;  Location: [facility] OR     vaginal dellivery         Social History     Tobacco Use     Smoking status: Former Smoker     Packs/day: 0.00     Years: 1.00     Pack years: 0.00     Types: Cigarettes     Smokeless tobacco: Never Used   Substance Use Topics     Alcohol use: Yes     Comment: 4 drinks per week     Family History   Problem Relation Age of Onset     Cancer Maternal Grandfather         lung, smoker     Heart Disease Maternal Grandfather         CAD age 70's  age 81     C.A.D. Maternal Grandfather         81     Cerebrovascular Disease Maternal Grandfather      Diabetes Maternal Grandfather      Other Cancer Maternal Grandfather      Cerebrovascular Disease Paternal Grandfather         Multiple CVA     Breast Cancer Paternal Grandmother      Cancer Paternal Grandmother      Diabetes Father      Asthma Father      Liver Cancer Father 71     Coronary Artery Disease Other      Liver Cancer Other      Depression Sister      Anxiety Disorder Sister      Coronary Artery Disease Paternal Uncle      Heart Failure Paternal Uncle      Coronary Artery Disease Paternal Uncle      Heart Failure Paternal Uncle      Hypertension No family hx of      Thyroid Disease No family hx of      Alcohol/Drug No family hx of      Cancer - colorectal No family hx of      Prostate Cancer No family hx of      Allergies No family hx of      Arthritis No family hx of      Alzheimer Disease No family hx of      Anesthesia Reaction No family hx of      Ovarian Cancer No family hx of      Mental Illness No family hx of      Osteoporosis No family hx of      Known Genetic Syndrome No family hx of      Obesity No family hx of       Unknown/Adopted No family hx of      Mental Illness No family hx of      Substance Abuse No family hx of      Genetic Disorder No family hx of      Hyperlipidemia No family hx of      Colon Cancer No family hx of          Current Outpatient Medications   Medication Sig Dispense Refill     citalopram (CELEXA) 20 MG tablet Take 1 tablet (20 mg) by mouth daily 30 tablet 1     filgrastim-sndz (ZARXIO) 480 MCG/0.8ML syringe Inject two to three times a week as instructed 12 Syringe 11     Multiple Vitamins-Minerals (MULTIVITAMIN ADULT PO)        norethindrone-ethinyl estradiol (DASETTA 1/35) 1-35 MG-MCG tablet TAKE ONE TABLET BY MOUTH ONCE DAILY CONTINUOUSLY 112 tablet 3     traZODone (DESYREL) 50 MG tablet Take 1 tablet (50 mg) by mouth At Bedtime (Patient not taking: Reported on 11/23/2020) 15 tablet 0       Mammogram not appropriate for this patient based on age.    Pertinent mammograms are reviewed under the imaging tab.  History of abnormal Pap smear: NO - age 30-65 PAP every 5 years with negative HPV co-testing recommended  PAP / HPV Latest Ref Rng & Units 11/3/2016 1/9/2014 9/29/2009   PAP - NIL NIL NIL   HPV 16 DNA NEG Negative - -   HPV 18 DNA NEG Negative - -   OTHER HR HPV NEG Negative - -     Reviewed and updated as needed this visit by clinical staff  Tobacco  Allergies  Meds  Problems  Med Hx  Surg Hx  Fam Hx  Soc Hx          Reviewed and updated as needed this visit by Provider  Tobacco  Allergies  Meds  Problems  Med Hx  Surg Hx  Fam Hx         Past Medical History:   Diagnosis Date     Depressive disorder      KAYY (generalised anxiety disorder)      Other abnormal Papanicolaou smear of cervix and cervical HPV(795.09)     colposcopy X 2     Other decreased white blood cell count 2005    seeing hematologist     Other neutropenia (H) 2005    Autoimmune, teated with neupogen injections     Seasonal allergies     uses albuterol prn     Uncomplicated asthma       Past Surgical History:   Procedure  "Laterality Date     ABDOMEN SURGERY  2014    hernia repair open     BIOPSY      bone marrow     C/SECTION, LOW TRANSVERSE  2013, 2014     LAPAROSCOPIC HERNIORRHAPHY UMBILICAL N/A 3/31/2015    Procedure: LAPAROSCOPIC HERNIORRHAPHY UMBILICAL;  Surgeon: Bigg Mirza MD;  Location: [facility] OR     vaginal dellivery  2011       Review of Systems   Constitutional: Negative for chills and fever.   HENT: Negative for congestion, ear pain, hearing loss and sore throat.    Eyes: Negative for pain and visual disturbance.   Respiratory: Negative for cough and shortness of breath.    Cardiovascular: Negative for chest pain, palpitations and peripheral edema.   Gastrointestinal: Negative for abdominal pain, constipation, diarrhea, heartburn, hematochezia and nausea.   Breasts:  Negative for tenderness, breast mass and discharge.   Genitourinary: Negative for dysuria, frequency, genital sores, hematuria, pelvic pain, urgency, vaginal bleeding and vaginal discharge.   Musculoskeletal: Positive for arthralgias. Negative for joint swelling and myalgias.   Skin: Negative for rash.   Neurological: Negative for dizziness, weakness, headaches and paresthesias.   Psychiatric/Behavioral: Positive for mood changes. The patient is nervous/anxious.           OBJECTIVE:   /68   Pulse 78   Temp 98  F (36.7  C) (Temporal)   Ht 1.702 m (5' 7\")   Wt 69.4 kg (153 lb)   SpO2 98%   BMI 23.96 kg/m    Physical Exam  GENERAL: healthy, alert and no distress  EYES: Eyes grossly normal to inspection, PERRL and conjunctivae and sclerae normal  HENT: ear canals and TM's normal, nose and mouth without ulcers or lesions  NECK: no adenopathy, no asymmetry, masses, or scars and thyroid normal to palpation  RESP: lungs clear to auscultation - no rales, rhonchi or wheezes  BREAST: normal without masses, tenderness or nipple discharge and no palpable axillary masses or adenopathy  CV: regular rate and rhythm, normal S1 S2, no S3 or S4, no murmur, " click or rub, no peripheral edema   ABDOMEN: soft, nontender, no hepatosplenomegaly, no masses and bowel sounds normal  MS: no gross musculoskeletal defects noted, no edema. No tenderness with metacarpal compression.  SKIN: no suspicious lesions or rashes  NEURO: Normal strength and tone, mentation intact and speech normal  PSYCH: Is tearful. mentation appears normal    Diagnostic Test Results:  Labs reviewed in Epic  No results found for this or any previous visit (from the past 24 hour(s)).    ASSESSMENT/PLAN:       ICD-10-CM    1. Routine general medical examination at a health care facility  Z00.00 CBC with platelets differential     Glucose     Lipid panel reflex to direct LDL Non-fasting     TSH with free T4 reflex     CANCELED: PNEUMOCOCCAL VACCINE,ADULT,SQ OR IM ([id number])     CANCELED: TSH with free T4 reflex     CANCELED: Glucose     CANCELED: CBC with platelets differential     CANCELED: Lipid panel reflex to direct LDL Non-fasting   2. Encounter for surveillance of contraceptive pills  Z30.41 norethindrone-ethinyl estradiol (DASETTA 1/35) 1-35 MG-MCG tablet   3. Multiple joint pain  M25.50 Rheumatology Referral   4. Autoimmune neutropenia (H)  D70.8    5. Moderate major depression (H)  F32.1 citalopram (CELEXA) 20 MG tablet   6. Intermittent asthma, uncomplicated  J45.20      PHQ-9 score of 18 today.    Drawing routine labs today, assessing for thyroid dysfunction with new diagnosis of depression. Also checking glucose and lipids. Assessing CBC with history of neutropenia and will look at hemoglobin.     OCP refilled today. Discussed importance of having a withdrawal bleed every 3-4 months and to take placebo pills that week.    Will start taking Celexa 20 mg for depression.   Instructed to break the tablet in half for the first week, taking 10 mg daily for 5-7 days before advancing to full tablet.     Educated on the side effects of SSRIs such as weight gain, sexual dysfunction, sleep disturbances,  "headache, nausea, GI upset.     Encouraged her to continue to take medication until it has reached its full therapeutic effect as it can take 4 weeks to notice an improvement in mood. Will follow up at that time. Counseling, prn.     Discussed the importance of a routine by getting exercise each day and eating healthy meals with fruits and vegetables while limiting sugar, salt, and trans fat.   Also encouraged a nighttime routine by either reading, journaling, taking a bath or doing mindfulness mediation. Talked about the benefits of a talking with a  Therapist.     Referral placed for Rheumatology in regard to past history of positive RF and current symptoms of joint pain.    Patient has been advised of split billing requirements and indicates understanding: Yes  COUNSELING:  Special attention given to:        Regular exercise       Healthy diet/nutrition       Immunizations    Vaccinated for: Pneumococcal    AAP  DAP updated.     Estimated body mass index is 23.96 kg/m  as calculated from the following:    Height as of this encounter: 1.702 m (5' 7\").    Weight as of this encounter: 69.4 kg (153 lb).        She reports that she has quit smoking. Her smoking use included cigarettes. She smoked 0.00 packs per day for 1.00 year. She has never used smokeless tobacco.      Follow up in 4 weeks via telephone or video visit to re-evaluate mood after starting Celexa.     Counseling Resources:  ATP IV Guidelines  Pooled Cohorts Equation Calculator  Breast Cancer Risk Calculator  BRCA-Related Cancer Risk Assessment: FHS-7 Tool  FRAX Risk Assessment  ICSI Preventive Guidelines  Dietary Guidelines for Americans, 2010  USDA's MyPlate  ASA Prophylaxis  Lung CA Screening    Patient seen by Janette Lawson RN, FNP student. Patient examined and note authored by EMMA Foley, CNP.    EMMA Langley CNP  [facility] [place]  Answers for HPI/ROS submitted by the patient on 11/23/2020   Annual Exam:  If you " checked off any problems, how difficult have these problems made it for you to do your work, take care of things at home, or get along with other people?: Not difficult at all  PHQ9 TOTAL SCORE: 18

## 2020-11-23 ENCOUNTER — OFFICE VISIT (OUTPATIENT)
Dept: FAMILY MEDICINE | Facility: CLINIC | Age: 40
End: 2020-11-23
Payer: COMMERCIAL

## 2020-11-23 VITALS
DIASTOLIC BLOOD PRESSURE: 68 MMHG | TEMPERATURE: 98 F | OXYGEN SATURATION: 98 % | BODY MASS INDEX: 24.01 KG/M2 | SYSTOLIC BLOOD PRESSURE: 120 MMHG | WEIGHT: 153 LBS | HEART RATE: 78 BPM | HEIGHT: 67 IN

## 2020-11-23 DIAGNOSIS — M25.50 MULTIPLE JOINT PAIN: ICD-10-CM

## 2020-11-23 DIAGNOSIS — J45.20 INTERMITTENT ASTHMA, UNCOMPLICATED: ICD-10-CM

## 2020-11-23 DIAGNOSIS — Z00.00 ROUTINE GENERAL MEDICAL EXAMINATION AT A HEALTH CARE FACILITY: Primary | ICD-10-CM

## 2020-11-23 DIAGNOSIS — D70.8 AUTOIMMUNE NEUTROPENIA (H): ICD-10-CM

## 2020-11-23 DIAGNOSIS — F32.1 MODERATE MAJOR DEPRESSION (H): ICD-10-CM

## 2020-11-23 DIAGNOSIS — Z30.41 ENCOUNTER FOR SURVEILLANCE OF CONTRACEPTIVE PILLS: ICD-10-CM

## 2020-11-23 PROCEDURE — 90670 PCV13 VACCINE IM: CPT | Performed by: NURSE PRACTITIONER

## 2020-11-23 PROCEDURE — 90471 IMMUNIZATION ADMIN: CPT | Performed by: NURSE PRACTITIONER

## 2020-11-23 PROCEDURE — 99395 PREV VISIT EST AGE 18-39: CPT | Mod: 25 | Performed by: NURSE PRACTITIONER

## 2020-11-23 RX ORDER — NORETHINDRONE AND ETHINYL ESTRADIOL 1 MG-35MCG
KIT ORAL
Qty: 112 TABLET | Refills: 3 | Status: SHIPPED | OUTPATIENT
Start: 2020-11-23 | End: 2021-12-13

## 2020-11-23 RX ORDER — CITALOPRAM HYDROBROMIDE 20 MG/1
20 TABLET ORAL DAILY
Qty: 30 TABLET | Refills: 1 | Status: SHIPPED | OUTPATIENT
Start: 2020-11-23 | End: 2020-12-22 | Stop reason: DRUGHIGH

## 2020-11-23 ASSESSMENT — ASTHMA QUESTIONNAIRES
QUESTION_4 LAST FOUR WEEKS HOW OFTEN HAVE YOU USED YOUR RESCUE INHALER OR NEBULIZER MEDICATION (SUCH AS ALBUTEROL): NOT AT ALL
QUESTION_5 LAST FOUR WEEKS HOW WOULD YOU RATE YOUR ASTHMA CONTROL: COMPLETELY CONTROLLED
QUESTION_2 LAST FOUR WEEKS HOW OFTEN HAVE YOU HAD SHORTNESS OF BREATH: NOT AT ALL
QUESTION_1 LAST FOUR WEEKS HOW MUCH OF THE TIME DID YOUR ASTHMA KEEP YOU FROM GETTING AS MUCH DONE AT WORK, SCHOOL OR AT HOME: NONE OF THE TIME
QUESTION_3 LAST FOUR WEEKS HOW OFTEN DID YOUR ASTHMA SYMPTOMS (WHEEZING, COUGHING, SHORTNESS OF BREATH, CHEST TIGHTNESS OR PAIN) WAKE YOU UP AT NIGHT OR EARLIER THAN USUAL IN THE MORNING: NOT AT ALL
ACT_TOTALSCORE: 25

## 2020-11-23 ASSESSMENT — ENCOUNTER SYMPTOMS
JOINT SWELLING: 0
CHILLS: 0
DIARRHEA: 0
DIZZINESS: 0
BREAST MASS: 0
ABDOMINAL PAIN: 0
PALPITATIONS: 0
NERVOUS/ANXIOUS: 1
NAUSEA: 0
SORE THROAT: 0
CONSTIPATION: 0
HEADACHES: 0
MYALGIAS: 0
HEMATURIA: 0
HEMATOCHEZIA: 0
ARTHRALGIAS: 1
COUGH: 0
HEARTBURN: 0
EYE PAIN: 0
DYSURIA: 0
PARESTHESIAS: 0
FREQUENCY: 0
SHORTNESS OF BREATH: 0
FEVER: 0
WEAKNESS: 0

## 2020-11-23 ASSESSMENT — PATIENT HEALTH QUESTIONNAIRE - PHQ9
10. IF YOU CHECKED OFF ANY PROBLEMS, HOW DIFFICULT HAVE THESE PROBLEMS MADE IT FOR YOU TO DO YOUR WORK, TAKE CARE OF THINGS AT HOME, OR GET ALONG WITH OTHER PEOPLE: NOT DIFFICULT AT ALL
SUM OF ALL RESPONSES TO PHQ QUESTIONS 1-9: 18
SUM OF ALL RESPONSES TO PHQ QUESTIONS 1-9: 18

## 2020-11-23 ASSESSMENT — PAIN SCALES - GENERAL: PAINLEVEL: NO PAIN (0)

## 2020-11-23 ASSESSMENT — MIFFLIN-ST. JEOR: SCORE: 1406.63

## 2020-11-23 NOTE — LETTER
My Depression Action Plan  Name: Alyssa Manzano   Date of Birth 1980  Date: 11/24/2020    My doctor: Tawana Handley   [facility]  [id number] [place] [address], [address]  [place] [id number] [phone]          GREEN    ZONE   Good Control    What it looks like:     Things are going generally well. You have normal ups and downs. You may even feel depressed from time to time, but bad moods usually last less than a day.   What you need to do:  1. Continue to care for yourself (see self care plan)  2. Check your depression survival kit and update it as needed  3. Follow your physician s recommendations including any medication.  4. Do not stop taking medication unless you consult with your physician first.           YELLOW         ZONE Getting Worse    What it looks like:     Depression is starting to interfere with your life.     It may be hard to get out of bed; you may be starting to isolate yourself from others.    Symptoms of depression are starting to last most all day and this has happened for several days.     You may have suicidal thoughts but they are not constant.   What you need to do:     1. Call your care team. Your response to treatment will improve if you keep your care team informed of your progress. Yellow periods are signs an adjustment may need to be made.     2. Continue your self-care.  Just get dressed and ready for the day.  Don't give yourself time to talk yourself out of it.    3. Talk to someone in your support network.    4. Open up your Depression Self-Care Plan/Wellness Kit.           RED    ZONE Medical Alert - Get Help    What it looks like:     Depression is seriously interfering with your life.     You may experience these or other symptoms: You can t get out of bed most days, can t work or engage in other necessary activities, you have trouble taking care of basic hygiene, or basic responsibilities, thoughts of suicide or death that will  not go away, self-injurious behavior.     What you need to do:  1. Call your care team and request a same-day appointment. If they are not available (weekends or after hours) call your local crisis line, emergency room or 911.            Depression Self-Care Plan / Wellness Kit    Self-Care for Depression  Here s the deal. Your body and mind are really not as separate as most people think.  What you do and think affects how you feel and how you feel influences what you do and think. This means if you do things that people who feel good do, it will help you feel better.  Sometimes this is all it takes.  There is also a place for medication and therapy depending on how severe your depression is, so be sure to consult with your medical provider and/ or Behavioral Health Consultant if your symptoms are worsening or not improving.     In order to better manage my stress, I will:    Exercise  Get some form of exercise, every day. This will help reduce pain and release endorphins, the  feel good  chemicals in your brain. This is almost as good as taking antidepressants!  This is not the same as joining a gym and then never going! (they count on that by the way ) It can be as simple as just going for a walk or doing some gardening, anything that will get you moving.      Hygiene   Maintain good hygiene (get out of bed in the morning, make your bed, brush your teeth, take a shower, and get dressed like you were going to work, even if you are unemployed).  If your clothes don't fit try to get ones that do.    Diet  Strive to eat foods that are good for me, drink plenty of water, and avoid excessive sugar, caffeine, alcohol, and other mood-altering substances.  Some foods that are helpful in depression are: complex carbohydrates, B vitamins, flaxseed, fish or fish oil, fresh fruits and vegetables.    Psychotherapy  Agree to participate in Individual Therapy (if recommended).    Medication  If prescribed medications, I agree to  take them.  Missing doses can result in serious side effects.  I understand that drinking alcohol, or other illicit drug use, may cause potential side effects.  I will not stop my medication abruptly without first discussing it with my provider.    Staying Connected With Others  Stay in touch with my friends, family members, and my primary care provider/team.    Use your imagination  Be creative.  We all have a creative side; it doesn t matter if it s oil painting, sand castles, or mud pies! This will also kick up the endorphins.    Witness Beauty  (AKA stop and smell the roses) Take a look outside, even in mid-winter. Notice colors, textures. Watch the squirrels and birds.     Service to others  Be of service to others.  There is always someone else in need.  By helping others we can  get out of ourselves  and remember the really important things.  This also provides opportunities for practicing all the other parts of the program.    Humor  Laugh and be silly!  Adjust your TV habits for less news and crime-drama and more comedy.    Control your stress  Try breathing deep, massage therapy, biofeedback, and meditation. Find time to relax each day.     Crisis Text Line  http://www.crisistextline.org    The Crisis Text Line serves anyone, in any type of crisis, providing access to free, 24/7 support and information via the medium people already use and trust:    Here's how it works:  1.  Text 023-520 from anywhere in the [place], anytime, about any type of crisis.  2.  A live, trained Crisis Counselor receives the text and responds quickly.  3.  The volunteer Crisis Counselor will help you move from a 'hot moment to a cool moment'.    My support system    Clinic Contact:  Phone number:    Contact 1:  Phone number:    Contact 2:  Phone number:    Restoration/:  Phone number:    Therapist:  Phone number:    Local crisis center:    Phone number:    Other community support:  Phone number:

## 2020-11-23 NOTE — LETTER
My Asthma Action Plan    Name: Alyssa Manzano   YOB: 1980  Date: 11/24/2020   My doctor: EMMA Langley CNP   My clinic: [facility]        My Rescue Medicine:   Albuterol inhaler (Proair/Ventolin/Proventil HFA)  2-4 puffs EVERY 4 HOURS as needed. Use a spacer if recommended by your provider.   My Asthma Severity:   Intermittent / Exercise Induced  Know your asthma triggers: Patient is unaware of triggers  None          GREEN ZONE   Good Control    I feel good    No cough or wheeze    Can work, sleep and play without asthma symptoms       Take your asthma control medicine every day.     1. If exercise triggers your asthma, take your rescue medication    15 minutes before exercise or sports, and    During exercise if you have asthma symptoms  2. Spacer to use with inhaler: If you have a spacer, make sure to use it with your inhaler             YELLOW ZONE Getting Worse  I have ANY of these:    I do not feel good    Cough or wheeze    Chest feels tight    Wake up at night   1. Keep taking your Green Zone medications  2. Start taking your rescue medicine:    every 20 minutes for up to 1 hour. Then every 4 hours for 24-48 hours.  3. If you stay in the Yellow Zone for more than 12-24 hours, contact your doctor.  4. If you do not return to the Green Zone in 12-24 hours or you get worse, start taking your oral steroid medicine if prescribed by your provider.           RED ZONE Medical Alert - Get Help  I have ANY of these:    I feel awful    Medicine is not helping    Breathing getting harder    Trouble walking or talking    Nose opens wide to breathe       1. Take your rescue medicine NOW  2. If your provider has prescribed an oral steroid medicine, start taking it NOW  3. Call your doctor NOW  4. If you are still in the Red Zone after 20 minutes and you have not reached your doctor:    Take your rescue medicine again and    Call 911 or go to the emergency room right away    See  your regular doctor within 2 weeks of an Emergency Room or Urgent Care visit for follow-up treatment.          Annual Reminders:  Meet with Asthma Educator,  Flu Shot in the Fall, consider Pneumonia Vaccination for patients with asthma (aged 19 and older).    Pharmacy:    [facility] - [place], [place] [facility] [id number] [address]    Electronically signed by EMMA Langley CNP   Date: 11/24/20                    Asthma Triggers  How To Control Things That Make Your Asthma Worse    Triggers are things that make your asthma worse.  Look at the list below to help you find your triggers and   what you can do about them. You can help prevent asthma flare-ups by staying away from your triggers.      Trigger                                                          What you can do   Cigarette Smoke  Tobacco smoke can make asthma worse. Do not allow smoking in your home, car or around you.  Be sure no one smokes at a child s day care or school.  If you smoke, ask your health care provider for ways to help you quit.  Ask family members to quit too.  Ask your health care provider for a referral to Quit Plan to help you quit smoking, or call 9-815-177-PLAN.     Colds, Flu, Bronchitis  These are common triggers of asthma. Wash your hands often.  Don t touch your eyes, nose or mouth.  Get a flu shot every year.     Dust Mites  These are tiny bugs that live in cloth or carpet. They are too small to see. Wash sheets and blankets in hot water every week.   Encase pillows and mattress in dust mite proof covers.  Avoid having carpet if you can. If you have carpet, vacuum weekly.   Use a dust mask and HEPA vacuum.   Pollen and Outdoor Mold  Some people are allergic to trees, grass, or weed pollen, or molds. Try to keep your windows closed.  Limit time out doors when pollen count is high.   Ask you health care provider about taking medicine during allergy  season.     Animal Dander  Some people are allergic to skin flakes, urine or saliva from pets with fur or feathers. Keep pets with fur or feathers out of your home.    If you can t keep the pet outdoors, then keep the pet out of your bedroom.  Keep the bedroom door closed.  Keep pets off cloth furniture and away from stuffed toys.     Mice, Rats, and Cockroaches  Some people are allergic to the waste from these pests.   Cover food and garbage.  Clean up spills and food crumbs.  Store grease in the refrigerator.   Keep food out of the bedroom.   Indoor Mold  This can be a trigger if your home has high moisture. Fix leaking faucets, pipes, or other sources of water.   Clean moldy surfaces.  Dehumidify basement if it is damp and smelly.   Smoke, Strong Odors, and Sprays  These can reduce air quality. Stay away from strong odors and sprays, such as perfume, powder, hair spray, paints, smoke incense, paint, cleaning products, candles and new carpet.   Exercise or Sports  Some people with asthma have this trigger. Be active!  Ask your doctor about taking medicine before sports or exercise to prevent symptoms.    Warm up for 5-10 minutes before and after sports or exercise.     Other Triggers of Asthma  Cold air:  Cover your nose and mouth with a scarf.  Sometimes laughing or crying can be a trigger.  Some medicines and food can trigger asthma.

## 2020-11-24 ASSESSMENT — ASTHMA QUESTIONNAIRES: ACT_TOTALSCORE: 25

## 2020-11-24 ASSESSMENT — PATIENT HEALTH QUESTIONNAIRE - PHQ9: SUM OF ALL RESPONSES TO PHQ QUESTIONS 1-9: 18

## 2020-12-21 NOTE — PROGRESS NOTES
"Alyssa Manzano is a 39 year old female who is being evaluated via a billable video visit.      The patient has been notified of following:     \"This video visit will be conducted via a call between you and your physician/provider. We have found that certain health care needs can be provided without the need for an in-person physical exam.  This service lets us provide the care you need with a video conversation.  If a prescription is necessary we can send it directly to your pharmacy.  If lab work is needed we can place an order for that and you can then stop by our lab to have the test done at a later time.    Video visits are billed at different rates depending on your insurance coverage.  Please reach out to your insurance provider with any questions.    If during the course of the call the physician/provider feels a video visit is not appropriate, you will not be charged for this service.\"    Patient has given verbal consent for Video visit? Yes  How would you like to obtain your AVS? MyChart  If you are dropped from the video visit, the video invite should be resent to: Text to cell phone: [phone]  Will anyone else be joining your video visit? No    Subjective     Alyssa Manzano is a 39 year old female who presents today via video visit for the following health issues:    HPI     Depression and Anxiety Follow-Up    How are you doing with your depression since your last visit? Improved     How are you doing with your anxiety since your last visit?  Improved     Are you having other symptoms that might be associated with depression or anxiety? No    Have you had a significant life event? No     Do you have any concerns with your use of alcohol or other drugs? No    Social History     Tobacco Use     Smoking status: Former Smoker     Packs/day: 0.00     Years: 1.00     Pack years: 0.00     Types: Cigarettes     Smokeless tobacco: Never Used   Substance Use Topics     Alcohol use: Yes     Comment: 4 drinks " per week     Drug use: No     PHQ 3/25/2019 11/23/2020 12/22/2020   PHQ-9 Total Score 0 18 7   Q9: Thoughts of better off dead/self-harm past 2 weeks Not at all Not at all Not at all     KAYY-7 SCORE 1/29/2018 3/25/2019 12/22/2020   Total Score - - -   Total Score 0 (minimal anxiety) 3 (minimal anxiety) -   Total Score 0 3 2     Last PHQ-9 12/22/2020   1.  Little interest or pleasure in doing things 2   2.  Feeling down, depressed, or hopeless 1   3.  Trouble falling or staying asleep, or sleeping too much 3   4.  Feeling tired or having little energy 0   5.  Poor appetite or overeating 0   6.  Feeling bad about yourself 0   7.  Trouble concentrating 1   8.  Moving slowly or restless 0   Q9: Thoughts of better off dead/self-harm past 2 weeks 0   PHQ-9 Total Score 7   Difficulty at work, home, or with people Not difficult at all     KAYY-7  12/22/2020   1. Feeling nervous, anxious, or on edge 1   2. Not being able to stop or control worrying 0   3. Worrying too much about different things 0   4. Trouble relaxing 0   5. Being so restless that it is hard to sit still 0   6. Becoming easily annoyed or irritable 1   7. Feeling afraid, as if something awful might happen 0   KAYY-7 Total Score 2   If you checked any problems, how difficult have they made it for you to do your work, take care of things at home, or get along with other people? Not difficult at all       Suicide Assessment Five-step Evaluation and Treatment (SAFE-T)      Video Start Time: 11:45 AM    Almost back to baseline.   Most improved with crying episodes.   Irritability is lessened.  Taking in am.   Sleep-hard time yet.   Taking Melatonin and Trazodone and still tossed and turned.   Appetite-  No bad SA- took once on empty stomach and was an issue.   No skipping meals.   Weight up a bit, but not worrisome.             Review of Systems   Constitutional, HEENT, cardiovascular, pulmonary, gi and gu systems are negative, except as otherwise noted.       Objective           Vitals:  No vitals were obtained today due to virtual visit.    Physical Exam     GENERAL: Healthy, alert and no distress  EYES: Eyes grossly normal to inspection.  No discharge or erythema, or obvious scleral/conjunctival abnormalities.  RESP: No audible wheeze, cough, or visible cyanosis.  No visible retractions or increased work of breathing.    SKIN: Visible skin clear. No significant rash, abnormal pigmentation or lesions.  NEURO: Cranial nerves grossly intact.  Mentation and speech appropriate for age.  PSYCH: Mentation appears normal, affect normal/bright, judgement and insight intact, normal speech and appearance well-groomed.              Assessment & Plan     Alyssa was seen today for recheck medication.    Diagnoses and all orders for this visit:    Moderate major depression (H)  -     citalopram (CELEXA) 40 MG tablet; Take 1 tablet (40 mg) by mouth daily    KAYY (generalized anxiety disorder)  -     citalopram (CELEXA) 40 MG tablet; Take 1 tablet (40 mg) by mouth daily          Increasing dose, will see if aiding with sleep/mood a bit more.   Regular exercise  Counseling, prn  Call if worse.   Refills     Return in about 6 months (around 6/22/2021) for Virtual Visit.    EMMA Langley CNP  [facility]      Video-Visit Details    Type of service:  Video Visit    Video End Time:11:54 AM    Originating Location (pt. Location): Home    Distant Location (provider location):  [facility]     Platform used for Video Visit: [employer]

## 2020-12-22 ENCOUNTER — VIRTUAL VISIT (OUTPATIENT)
Dept: FAMILY MEDICINE | Facility: CLINIC | Age: 40
End: 2020-12-22
Payer: COMMERCIAL

## 2020-12-22 DIAGNOSIS — F41.1 GAD (GENERALIZED ANXIETY DISORDER): ICD-10-CM

## 2020-12-22 DIAGNOSIS — F32.1 MODERATE MAJOR DEPRESSION (H): Primary | ICD-10-CM

## 2020-12-22 PROCEDURE — 99214 OFFICE O/P EST MOD 30 MIN: CPT | Mod: 95 | Performed by: NURSE PRACTITIONER

## 2020-12-22 RX ORDER — CITALOPRAM HYDROBROMIDE 40 MG/1
40 TABLET ORAL DAILY
Qty: 90 TABLET | Refills: 1 | Status: SHIPPED | OUTPATIENT
Start: 2020-12-22 | End: 2021-06-04

## 2020-12-22 ASSESSMENT — PATIENT HEALTH QUESTIONNAIRE - PHQ9
5. POOR APPETITE OR OVEREATING: NOT AT ALL
SUM OF ALL RESPONSES TO PHQ QUESTIONS 1-9: 7

## 2020-12-22 ASSESSMENT — ANXIETY QUESTIONNAIRES
IF YOU CHECKED OFF ANY PROBLEMS ON THIS QUESTIONNAIRE, HOW DIFFICULT HAVE THESE PROBLEMS MADE IT FOR YOU TO DO YOUR WORK, TAKE CARE OF THINGS AT HOME, OR GET ALONG WITH OTHER PEOPLE: NOT DIFFICULT AT ALL
6. BECOMING EASILY ANNOYED OR IRRITABLE: SEVERAL DAYS
3. WORRYING TOO MUCH ABOUT DIFFERENT THINGS: NOT AT ALL
2. NOT BEING ABLE TO STOP OR CONTROL WORRYING: NOT AT ALL
GAD7 TOTAL SCORE: 2
7. FEELING AFRAID AS IF SOMETHING AWFUL MIGHT HAPPEN: NOT AT ALL
1. FEELING NERVOUS, ANXIOUS, OR ON EDGE: SEVERAL DAYS
5. BEING SO RESTLESS THAT IT IS HARD TO SIT STILL: NOT AT ALL

## 2020-12-23 ASSESSMENT — ANXIETY QUESTIONNAIRES: GAD7 TOTAL SCORE: 2

## 2021-01-19 ENCOUNTER — TELEPHONE (OUTPATIENT)
Dept: FAMILY MEDICINE | Facility: CLINIC | Age: 41
End: 2021-01-19

## 2021-01-19 DIAGNOSIS — Z71.89 ADVANCED DIRECTIVES, COUNSELING/DISCUSSION: Primary | ICD-10-CM

## 2021-01-19 NOTE — TELEPHONE ENCOUNTER
----- Message from EMMA Parr CNP sent at 12/22/2020 11:55 AM CST -----  Update PHQ-9 and KAYY-7 through [email or web address].   EMMA Langley CNP

## 2021-01-21 DIAGNOSIS — F32.1 MODERATE MAJOR DEPRESSION (H): ICD-10-CM

## 2021-01-21 DIAGNOSIS — Z30.41 ENCOUNTER FOR SURVEILLANCE OF CONTRACEPTIVE PILLS: ICD-10-CM

## 2021-01-21 DIAGNOSIS — D70.8 OTHER NEUTROPENIA (H): ICD-10-CM

## 2021-01-22 NOTE — TELEPHONE ENCOUNTER
Called and left VM asking patient to call back. When she calls back please see if the patient was requesting a refill of the medication at lower dose (20 mg vs current dose of 40 mg) or if this was an automatic request from pharmacy?    Earnest Claros RN, BSN

## 2021-01-25 RX ORDER — CITALOPRAM HYDROBROMIDE 20 MG/1
TABLET ORAL
Qty: 30 TABLET | Refills: 1 | OUTPATIENT
Start: 2021-01-25

## 2021-01-25 NOTE — TELEPHONE ENCOUNTER
Birth control just filled 11/23/2020 for 1 year supply. Please find out if patient needs the Zarxio that is ordered.    Left message on machine to call back    Sarah Cosme RN on 1/25/2021 at 2:36 PM

## 2021-01-25 NOTE — TELEPHONE ENCOUNTER
Spoke with pt and it is for her birth control not the celexa-RN please remove and review BC refill.    Brandi Erickson CMA (AAMA)

## 2021-03-09 ENCOUNTER — MYC MEDICAL ADVICE (OUTPATIENT)
Dept: FAMILY MEDICINE | Facility: CLINIC | Age: 41
End: 2021-03-09

## 2021-03-09 NOTE — TELEPHONE ENCOUNTER
Please see letter dated 3/9/21 regarding patiens immunity, print and email to her at [email or web address] she is unable to retrieve through My Chart.     Thank you  Lucretia Muhammad CNP

## 2021-03-17 NOTE — TELEPHONE ENCOUNTER
Patient updated KAYY/PHQ9    PHQ-9 score:    PHQ 3/16/2021   PHQ-9 Total Score 9   Q9: Thoughts of better off dead/self-harm past 2 weeks Not at all       KAYY-7 SCORE 12/22/2020 1/22/2021 3/16/2021   Total Score - - -   Total Score - 0 (minimal anxiety) 3 (minimal anxiety)   Total Score 2 0 3

## 2021-03-18 ENCOUNTER — MYC MEDICAL ADVICE (OUTPATIENT)
Dept: FAMILY MEDICINE | Facility: CLINIC | Age: 41
End: 2021-03-18

## 2021-03-19 NOTE — TELEPHONE ENCOUNTER
Pt. Having some weight gain- 10 LBS>   Does not want dose adjust on Celexa. Going back to work in May after vaccine is full affect- feels this will be better for her mood.     Answered questions about Zarxio, continue routine dosing. EMMA Langley CNP

## 2021-06-21 ENCOUNTER — MYC REFILL (OUTPATIENT)
Dept: FAMILY MEDICINE | Facility: CLINIC | Age: 41
End: 2021-06-21

## 2021-06-21 DIAGNOSIS — F32.1 MODERATE MAJOR DEPRESSION (H): ICD-10-CM

## 2021-06-21 DIAGNOSIS — F41.1 GAD (GENERALIZED ANXIETY DISORDER): ICD-10-CM

## 2021-06-21 NOTE — LETTER
June 25, 2021      Alyssa Manzano  [address] [facility] [id number]              Hi Alyssa,     It looks like you are due for a visit to discuss your medication refills and mood for the requested refill on your Citalopram(Celexa) 40 mg tablet.  You can schedule an appointment through your [employer] or you can call us at [phone] and we can help you get scheduled.     Thank you and have a great day!  Your [facility] Care Team

## 2021-06-22 NOTE — TELEPHONE ENCOUNTER
Pending Prescriptions:                       Disp   Refills    citalopram (CELEXA) 40 MG tablet           30 tab*0        Sig: Take 1 tablet (40 mg) by mouth daily    Routing refill request to provider for review/approval because:  Labs out of range:  PHQ9  PHQ-9 score:    PHQ 3/16/2021   PHQ-9 Total Score 9   Q9: Thoughts of better off dead/self-harm past 2 weeks Not at all     Patient needs to be seen because:  Due for virtual visit

## 2021-06-23 RX ORDER — CITALOPRAM HYDROBROMIDE 40 MG/1
40 TABLET ORAL DAILY
Qty: 30 TABLET | Refills: 0 | Status: SHIPPED | OUTPATIENT
Start: 2021-06-23 | End: 2021-08-27

## 2021-06-28 DIAGNOSIS — D70.8 AUTOIMMUNE NEUTROPENIA (H): ICD-10-CM

## 2021-06-28 LAB
BASOPHILS # BLD AUTO: 0 10E9/L (ref 0–0.2)
BASOPHILS NFR BLD AUTO: 0.7 %
DIFFERENTIAL METHOD BLD: NORMAL
EOSINOPHIL # BLD AUTO: 0.1 10E9/L (ref 0–0.7)
EOSINOPHIL NFR BLD AUTO: 2.2 %
ERYTHROCYTE [DISTWIDTH] IN BLOOD BY AUTOMATED COUNT: 12.5 % (ref 10–15)
HCT VFR BLD AUTO: 36.7 % (ref 35–47)
HGB BLD-MCNC: 12.5 G/DL (ref 11.7–15.7)
IMM GRANULOCYTES # BLD: 0 10E9/L (ref 0–0.4)
IMM GRANULOCYTES NFR BLD: 0.2 %
LYMPHOCYTES # BLD AUTO: 1.3 10E9/L (ref 0.8–5.3)
LYMPHOCYTES NFR BLD AUTO: 29.2 %
MCH RBC QN AUTO: 31.8 PG (ref 26.5–33)
MCHC RBC AUTO-ENTMCNC: 34.1 G/DL (ref 31.5–36.5)
MCV RBC AUTO: 93 FL (ref 78–100)
MONOCYTES # BLD AUTO: 0.2 10E9/L (ref 0–1.3)
MONOCYTES NFR BLD AUTO: 5 %
NEUTROPHILS # BLD AUTO: 2.9 10E9/L (ref 1.6–8.3)
NEUTROPHILS NFR BLD AUTO: 62.7 %
PLATELET # BLD AUTO: 198 10E9/L (ref 150–450)
RBC # BLD AUTO: 3.93 10E12/L (ref 3.8–5.2)
WBC # BLD AUTO: 4.6 10E9/L (ref 4–11)

## 2021-06-28 PROCEDURE — 36415 COLL VENOUS BLD VENIPUNCTURE: CPT | Performed by: INTERNAL MEDICINE

## 2021-06-28 PROCEDURE — 85025 COMPLETE CBC W/AUTO DIFF WBC: CPT | Performed by: INTERNAL MEDICINE

## 2021-06-29 ENCOUNTER — VIRTUAL VISIT (OUTPATIENT)
Dept: ONCOLOGY | Facility: CLINIC | Age: 41
End: 2021-06-29
Payer: COMMERCIAL

## 2021-06-29 DIAGNOSIS — D70.8 OTHER NEUTROPENIA (H): ICD-10-CM

## 2021-06-29 PROCEDURE — 99213 OFFICE O/P EST LOW 20 MIN: CPT | Mod: GT | Performed by: INTERNAL MEDICINE

## 2021-06-29 RX ORDER — FILGRASTIM-SNDZ 480 UG/.8ML
INJECTION, SOLUTION INTRAVENOUS; SUBCUTANEOUS
Qty: 0.8 ML | Refills: 11 | Status: SHIPPED | OUTPATIENT
Start: 2021-06-29 | End: 2023-04-13

## 2021-06-29 NOTE — LETTER
"    6/29/2021         RE: Alyssa Manzano  [address] [facility] [address]        Dear Colleague,    Thank you for referring your patient, Alyssa Manzano, to the [facility]. Please see a copy of my visit note below.    Alyssa Manzano is a 40 year old female who is being evaluated via a billable video visit.      The patient has been notified of following:     \"This video visit will be conducted via a call between you and your physician/provider. We have found that certain health care needs can be provided without the need for an in-person physical exam.  This service lets us provide the care you need with a video conversation.  If a prescription is necessary we can send it directly to your pharmacy.  If lab work is needed we can place an order for that and you can then stop by our lab to have the test done at a later time.    Video visits are billed at different rates depending on your insurance coverage.  Please reach out to your insurance provider with any questions.    If during the course of the call the physician/provider feels a video visit is not appropriate, you will not be charged for this service.\"    Patient has given verbal consent for Video visit?yes  Will anyone else be joining your video visit?no    Video-Visit Details    Type of service:  Video Visit    Video visit time:3 min  Originating Location (pt. Location): home    Distant Location (provider location):  [facility]     Platform used for Video Visit: KRISTY Nichole MD, MD          Hematology Follow-up Visit:  Jun 29, 2021      Referring Provider: Tawana Handley NP  OB/GYN: Dr. Jackson    Diagnosis: Autoimmune Neutropenia  -    Hematologic History:   1.  Autoimmune neutropenia-neutropenia dates back at least to March 2007 when her ANC measured 1.2. Hemoglobin and platelet count were normal. At that time, she saw Dr. Abraham Ybarra for a hematology consultation, had an " unremarkable peripheral blood smear, and was felt to have benign neutropenia. No treatment recommendations were made at that time.   Since then, she was noted to have severe neutropenia on several occasions. Her hematologic work-up for neutropenia was essentially negative with complete metabolic panel normal. Vitamin B12, folic acid, TSH, Hep B panel, Hep C, and HIV antibody unremarkable. CAT negative. Immunoglobulins revealed low normal IgG level at 690 mg/dL (normal 695-1620 mg/dL). IgA and IgM were normal. RA factor slightly elevated at 20 IU/mL. Peripheral blood smear revealed slight normochromic normocytic anemia and marked leukopenia due to neutropenia. Spleen was mildly enlarged on an US and rheumatoid factor was positive and she was seen by Dr. Solis but he did not feel that she had Felty's syndrome or a clinically significant connective tissue disorder.     We decided to proceed with a bone marrow biopsy for further evaluation. BMBX on 6/8/2011 showed:    Marrow cellularity of 50-60% with trilineage hematopoietic maturation  Left-shifted but complete granulocytic maturation  No increase in blasts; no morphologic evidence of dysplasia    Flow Cytometry showed:  Polytypic B lymphocytes  No aberrant immunophenotype on T lymphocytes  No increase in blasts  Cytogenetics showed:  46,XX[20]    No clonal chromosomal abnormality was detected among the 20 metaphase cells analyzed. Thus, no cytogenetic evidence of a malignant process was found.    She had antigranulocyte antibodies detected in her blood.   She has two daughters, the youngest is 3 years old and with both of her pregnancies her neutropenia has responded to PO prednisone 20 mg orally daily in the last month before delivery. She has three children at home.    She was seen by rheumatologist Dr. Solis in the past (about 5 years ago per patient) but he did not feel that she had Felty's syndrome or a clinically significant connective tissue disorder.   She  has no family history of hematologic disorders or autoimmune disease.    2.  Family history of malignancy-she met with GC (due to FHx of malignancy in father 0 kidney ca at the age of 70. Her paternal uncle was diagnosed with lung cancer in mid 60s.  Both her father and paternal uncle had history of colonic polyps.  Her paternal grandmother was diagnosed with breast cancer in her 40s and then again at the age of 90.),  and proceeded with CustomNext-Cancer panel (a combination of the CancerNext panel + RenalNext panel + AXIN2, MSH3, NTHL1) offered by [employer].  Alyssa is negative for mutations in the 49 genes analyzed: APC, MARIANN, AXIN2, BAP1, BARD1, BMPR1A, BRCA1, BRCA2, BRIP1, CDH1, CDK4, CDKN2A, CHEK2, DICER1, FH, FLCN, HOXB13, MET, MLH1, MRE11A, MSH2, MSH3, MSH6, MUTYH, NBN, NF1, NTHL1, PALB2, PMS2, POLD1, POLE, PTEN, RAD50, RAD51C, RAD51D, SDHA, SDHB, SDHC, SDHD, SMAD4, SMARCA4, STK11, TP53, TSC1, TSC2 and VHL (sequencing and deletion/duplication); MITF (sequencing only); EPCAM and GREM1 (deletion/duplication only).     Interval History:  Alyssa is a 40 year old female diagnosed with autoimmune neutropenia present today for follow-up.   She has remained on  Neupogen 480 mcg twice a week on average prophylactically. Her injections have been uneventful.  She gets canker sores in her mouth if she injects less frequently.  She did receive two series of Moderna's COVID-19 vaccination.  She has not had any significant infections in the last year.  ANC was normal at 2.9 on June 28, 2021, as below.   In addition, a complete 12 point  review of systems is negative.          Allergies:  Allergies as of 01/24/2013 - reviewed 01/24/2013    Allergen  Reaction  Noted       No known drug allergies    04/25/2011          Current Medications:  Current Outpatient Medications   Medication     citalopram (CELEXA) 40 MG tablet     filgrastim-sndz (ZARXIO) 480 MCG/0.8ML syringe     Multiple Vitamins-Minerals (MULTIVITAMIN  ADULT PO)     norethindrone-ethinyl estradiol (DASETTA 1/35) 1-35 MG-MCG tablet     No current facility-administered medications for this visit.          Physical Exam:  Constitutional: alert and in no distress  Eyes: No redness or discharge  Respiratory: No cough or labored breathing.  Musculoskeletal: Full range of motion in extremities.  Skin: no visible skin lesions or discoloration  Neurological: No tremors and denies headache.  Psychiatric: Mentation appears normal and affect is normal as well.  Alert and oriented x3.  The rest the comprehensive physical examination is deferred due to public health emergency video visit restrictions.    Laboratory Studies:    Component      Latest Ref Rng & Units 6/28/2021   WBC      4.0 - 11.0 10e9/L 4.6   RBC Count      3.8 - 5.2 10e12/L 3.93   Hemoglobin      11.7 - 15.7 g/dL 12.5   Hematocrit      35.0 - 47.0 % 36.7   MCV      78 - 100 fl 93   MCH      26.5 - 33.0 pg 31.8   MCHC      31.5 - 36.5 g/dL 34.1   RDW      10.0 - 15.0 % 12.5   Platelet Count      150 - 450 10e9/L 198   Diff Method       Automated Method   % Neutrophils      % 62.7   % Lymphocytes      % 29.2   % Monocytes      % 5.0   % Eosinophils      % 2.2   % Basophils      % 0.7   % Immature Granulocytes      % 0.2   Absolute Neutrophil      1.6 - 8.3 10e9/L 2.9   Absolute Lymphocytes      0.8 - 5.3 10e9/L 1.3   Absolute Monocytes      0.0 - 1.3 10e9/L 0.2   Absolute Eosinophils      0.0 - 0.7 10e9/L 0.1   Absolute Basophils      0.0 - 0.2 10e9/L 0.0   Abs Immature Granulocytes      0 - 0.4 10e9/L 0.0       ASSESSMENT/PLAN:  Alyssa is a radha 40 year old woman with 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  1. Neutropenia, autoimmune, with presence of antigranulocyte antibodies.   ANC normal on Neupogen  480mcg twice a week. Rx renewed for one year. Goal micaela ANC of >1.0 where usually no serious infections occur and oral aphthae disappear. F/up  in one year with cbcd.  2.  Recommend annual influenza  vaccination in the fall.  At the end of the visit the patient verbalized understanding, denied any further questions, and concurred with the plan of care.           Again, thank you for allowing me to participate in the care of your patient.        Sincerely,        Constance Nichole MD, MD

## 2021-06-29 NOTE — PROGRESS NOTES
"Alyssa Manzano is a 40 year old female who is being evaluated via a billable video visit.      The patient has been notified of following:     \"This video visit will be conducted via a call between you and your physician/provider. We have found that certain health care needs can be provided without the need for an in-person physical exam.  This service lets us provide the care you need with a video conversation.  If a prescription is necessary we can send it directly to your pharmacy.  If lab work is needed we can place an order for that and you can then stop by our lab to have the test done at a later time.    Video visits are billed at different rates depending on your insurance coverage.  Please reach out to your insurance provider with any questions.    If during the course of the call the physician/provider feels a video visit is not appropriate, you will not be charged for this service.\"    Patient has given verbal consent for Video visit?yes  Will anyone else be joining your video visit?no    Video-Visit Details    Type of service:  Video Visit    Video visit time:3 min  Originating Location (pt. Location): home    Distant Location (provider location):  [facility]     Platform used for Video Visit: [employer]  Constance Nichole MD, MD          Hematology Follow-up Visit:  Jun 29, 2021      Referring Provider: Tawana Handley NP  OB/GYN: Dr. Jackson    Diagnosis: Autoimmune Neutropenia  -    Hematologic History:   1.  Autoimmune neutropenia-neutropenia dates back at least to March 2007 when her ANC measured 1.2. Hemoglobin and platelet count were normal. At that time, she saw Dr. Abraham Ybarra for a hematology consultation, had an unremarkable peripheral blood smear, and was felt to have benign neutropenia. No treatment recommendations were made at that time.   Since then, she was noted to have severe neutropenia on several occasions. Her hematologic work-up for neutropenia was essentially negative " with complete metabolic panel normal. Vitamin B12, folic acid, TSH, Hep B panel, Hep C, and HIV antibody unremarkable. CAT negative. Immunoglobulins revealed low normal IgG level at 690 mg/dL (normal 695-1620 mg/dL). IgA and IgM were normal. RA factor slightly elevated at 20 IU/mL. Peripheral blood smear revealed slight normochromic normocytic anemia and marked leukopenia due to neutropenia. Spleen was mildly enlarged on an US and rheumatoid factor was positive and she was seen by Dr. Solis but he did not feel that she had Felty's syndrome or a clinically significant connective tissue disorder.     We decided to proceed with a bone marrow biopsy for further evaluation. BMBX on 6/8/2011 showed:    Marrow cellularity of 50-60% with trilineage hematopoietic maturation  Left-shifted but complete granulocytic maturation  No increase in blasts; no morphologic evidence of dysplasia    Flow Cytometry showed:  Polytypic B lymphocytes  No aberrant immunophenotype on T lymphocytes  No increase in blasts  Cytogenetics showed:  46,XX[20]    No clonal chromosomal abnormality was detected among the 20 metaphase cells analyzed. Thus, no cytogenetic evidence of a malignant process was found.    She had antigranulocyte antibodies detected in her blood.   She has two daughters, the youngest is 3 years old and with both of her pregnancies her neutropenia has responded to PO prednisone 20 mg orally daily in the last month before delivery. She has three children at home.    She was seen by rheumatologist Dr. Solis in the past (about 5 years ago per patient) but he did not feel that she had Felty's syndrome or a clinically significant connective tissue disorder.   She has no family history of hematologic disorders or autoimmune disease.    2.  Family history of malignancy-she met with GC (due to FHx of malignancy in father 0 kidney ca at the age of 70. Her paternal uncle was diagnosed with lung cancer in mid 60s.  Both her father and  paternal uncle had history of colonic polyps.  Her paternal grandmother was diagnosed with breast cancer in her 40s and then again at the age of 90.),  and proceeded with CustomNext-Cancer panel (a combination of the CancerNext panel + RenalNext panel + AXIN2, MSH3, NTHL1) offered by [employer].  Alyssa is negative for mutations in the 49 genes analyzed: APC, MARIANN, AXIN2, BAP1, BARD1, BMPR1A, BRCA1, BRCA2, BRIP1, CDH1, CDK4, CDKN2A, CHEK2, DICER1, FH, FLCN, HOXB13, MET, MLH1, MRE11A, MSH2, MSH3, MSH6, MUTYH, NBN, NF1, NTHL1, PALB2, PMS2, POLD1, POLE, PTEN, RAD50, RAD51C, RAD51D, SDHA, SDHB, SDHC, SDHD, SMAD4, SMARCA4, STK11, TP53, TSC1, TSC2 and VHL (sequencing and deletion/duplication); MITF (sequencing only); EPCAM and GREM1 (deletion/duplication only).     Interval History:  Alyssa is a 40 year old female diagnosed with autoimmune neutropenia present today for follow-up.   She has remained on  Neupogen 480 mcg twice a week on average prophylactically. Her injections have been uneventful.  She gets canker sores in her mouth if she injects less frequently.  She did receive two series of Moderna's COVID-19 vaccination.  She has not had any significant infections in the last year.  ANC was normal at 2.9 on June 28, 2021, as below.   In addition, a complete 12 point  review of systems is negative.          Allergies:  Allergies as of 01/24/2013 - reviewed 01/24/2013    Allergen  Reaction  Noted       No known drug allergies    04/25/2011          Current Medications:  Current Outpatient Medications   Medication     citalopram (CELEXA) 40 MG tablet     filgrastim-sndz (ZARXIO) 480 MCG/0.8ML syringe     Multiple Vitamins-Minerals (MULTIVITAMIN ADULT PO)     norethindrone-ethinyl estradiol (DASETTA 1/35) 1-35 MG-MCG tablet     No current facility-administered medications for this visit.          Physical Exam:  Constitutional: alert and in no distress  Eyes: No redness or discharge  Respiratory: No cough or  labored breathing.  Musculoskeletal: Full range of motion in extremities.  Skin: no visible skin lesions or discoloration  Neurological: No tremors and denies headache.  Psychiatric: Mentation appears normal and affect is normal as well.  Alert and oriented x3.  The rest the comprehensive physical examination is deferred due to public health emergency video visit restrictions.    Laboratory Studies:    Component      Latest Ref Rng & Units 6/28/2021   WBC      4.0 - 11.0 10e9/L 4.6   RBC Count      3.8 - 5.2 10e12/L 3.93   Hemoglobin      11.7 - 15.7 g/dL 12.5   Hematocrit      35.0 - 47.0 % 36.7   MCV      78 - 100 fl 93   MCH      26.5 - 33.0 pg 31.8   MCHC      31.5 - 36.5 g/dL 34.1   RDW      10.0 - 15.0 % 12.5   Platelet Count      150 - 450 10e9/L 198   Diff Method       Automated Method   % Neutrophils      % 62.7   % Lymphocytes      % 29.2   % Monocytes      % 5.0   % Eosinophils      % 2.2   % Basophils      % 0.7   % Immature Granulocytes      % 0.2   Absolute Neutrophil      1.6 - 8.3 10e9/L 2.9   Absolute Lymphocytes      0.8 - 5.3 10e9/L 1.3   Absolute Monocytes      0.0 - 1.3 10e9/L 0.2   Absolute Eosinophils      0.0 - 0.7 10e9/L 0.1   Absolute Basophils      0.0 - 0.2 10e9/L 0.0   Abs Immature Granulocytes      0 - 0.4 10e9/L 0.0       ASSESSMENT/PLAN:  Alyssa is a radha 40 year old woman with 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  1. Neutropenia, autoimmune, with presence of antigranulocyte antibodies.   ANC normal on Neupogen  480mcg twice a week. Rx renewed for one year. Goal micaela ANC of >1.0 where usually no serious infections occur and oral aphthae disappear. F/up  in one year with cbcd.  2.  Recommend annual influenza vaccination in the fall.  At the end of the visit the patient verbalized understanding, denied any further questions, and concurred with the plan of care.

## 2021-06-29 NOTE — NURSING NOTE
Alyssa is a 40 year old who is being evaluated via a billable video visit.      How would you like to obtain your AVS? MyChart  If the video visit is dropped, the invitation should be resent by: Text to cell phone: [phone]  Will anyone else be joining your video visit? No      Video-Visit Details    Type of service:  Video Visit    Originating Location (pt. Location): Home    Distant Location (provider location):  [facility]     Platform used for Video Visit: Simon Kelsey CMA

## 2021-08-27 ENCOUNTER — MYC REFILL (OUTPATIENT)
Dept: FAMILY MEDICINE | Facility: CLINIC | Age: 41
End: 2021-08-27

## 2021-08-27 DIAGNOSIS — F41.1 GAD (GENERALIZED ANXIETY DISORDER): ICD-10-CM

## 2021-08-27 DIAGNOSIS — F32.1 MODERATE MAJOR DEPRESSION (H): ICD-10-CM

## 2021-08-27 RX ORDER — CITALOPRAM HYDROBROMIDE 40 MG/1
40 TABLET ORAL DAILY
Qty: 30 TABLET | Refills: 0 | Status: SHIPPED | OUTPATIENT
Start: 2021-08-27 | End: 2021-08-31

## 2021-08-27 NOTE — PROGRESS NOTES
Alyssa is a 40 year old who is being evaluated via a billable video visit.      How would you like to obtain your AVS? MyChart  If the video visit is dropped, the invitation should be resent by: Other e-mail: .  Will anyone else be joining your video visit? No      Video Start Time: 11:05 AM    Assessment & Plan       ICD-10-CM    1. Attention deficit  R41.840 buPROPion (WELLBUTRIN XL) 150 MG 24 hr tablet     MENTAL HEALTH REFERRAL  - Adult; Assessments and Testing; ADHD; [facility] [phone]; We will contact you to schedule the appointment or please call with any questions   2. KAYY (generalized anxiety disorder)  F41.1 buPROPion (WELLBUTRIN XL) 150 MG 24 hr tablet     MENTAL HEALTH REFERRAL  - Adult; Assessments and Testing; ADHD; [facility] [phone]; We will contact you to schedule the appointment or please call with any questions     citalopram (CELEXA) 40 MG tablet   3. Moderate major depression (H)  F32.1 buPROPion (WELLBUTRIN XL) 150 MG 24 hr tablet     MENTAL HEALTH REFERRAL  - Adult; Assessments and Testing; ADHD; [facility] [phone]; We will contact you to schedule the appointment or please call with any questions     citalopram (CELEXA) 40 MG tablet      ADHD referral.   Adding Wellbutrin in meantime. Discussed use, risks, SA>   Can taper down to Celexa 20 mg in 1 month.   PE in Nov.   Covid Booster recommended.  Return to clinic with any new or worsening symptoms, and as needed.                  Return in about 9 weeks (around 11/2/2021) for Physical Exam.    EMMA Langley [facility] RESHMA Shah is a 40 year old who presents for the following health issues     History of Present Illness       Mental Health Follow-up:  Patient presents to follow-up on Depression & Anxiety.            Patient has no concerns about alcohol or drug use.     Social History  Tobacco Use    Smoking status: Former Smoker    "   Packs/day: 0.00      Years: 1.00      Pack years: 0      Types: Cigarettes    Smokeless tobacco: Never Used  Alcohol use: Yes    Comment: 4 drinks per week  Drug use: No      Today's PHQ-9         PHQ-9 Total Score:     (P) 4   PHQ-9 Q9 Thoughts of better off dead/self-harm past 2 weeks :   (P) Not at all   Thoughts of suicide or self harm:      Self-harm Plan:        Self-harm Action:          Safety concerns for self or others:           She eats 2-3 servings of fruits and vegetables daily.She consumes 0 sweetened beverage(s) daily.She exercises with enough effort to increase her heart rate 9 or less minutes per day.  She exercises with enough effort to increase her heart rate 3 or less days per week. She is missing 1 dose(s) of medications per week.  She is not taking prescribed medications regularly due to remembering to take.       Anti-depressant- feeling really good, mood is high.   Dose is good. \"perfect amount\"  Is back at work and in society- doing better.     Feelings about ADD- is disorganized, does not use for very long.   Constant thoughts in her head.   completing and finishing tasks.   Horrible time management.   In person- does not comprehend unless eye contact.   Constant interrupter. - will forget what she's going to say.   Can get motivated and will do tasks fast and well, and hyperfocused.   No object permanence. Will forget things are a mess until she visualized it.     Concerns of attention from patient:    In school- couldn't take tests. Did homework- always- because she couldn't take tests, got done right away.  was the class clown.   Had test anxiety, would read questions and get distracted.   Needs deadlines to finish tasks.   Was [employer] student.     Working, purchasing mgr for new homes.   - has put deadlines in place.   Carries paper around to jot down \"to do's\"                Due for PE.    Asthma- stable. ACT done.   No concerns.     Can receive Moderna booster.         Review of " Systems   Constitutional, HEENT, cardiovascular, pulmonary, gi and gu systems are negative, except as otherwise noted.      Objective           Vitals:  No vitals were obtained today due to virtual visit.    Physical Exam   GENERAL: Healthy, alert and no distress  EYES: Eyes grossly normal to inspection.  No discharge or erythema, or obvious scleral/conjunctival abnormalities.  RESP: No audible wheeze, cough, or visible cyanosis.  No visible retractions or increased work of breathing.    SKIN: Visible skin clear. No significant rash, abnormal pigmentation or lesions.  NEURO: Cranial nerves grossly intact.  Mentation and speech appropriate for age.  PSYCH: Mentation appears normal, affect normal/bright, judgement and insight intact, normal speech and appearance well-groomed.                Video-Visit Details    Type of service:  Video Visit    Video End Time:11:26 AM    Originating Location (pt. Location): Home    Distant Location (provider location):  [facility] [place]     Platform used for Video Visit: [employer]  Answers for HPI/ROS submitted by the patient on 8/30/2021  If you checked off any problems, how difficult have these problems made it for you to do your work, take care of things at home, or get along with other people?: Somewhat difficult  PHQ9 TOTAL SCORE: 4  KAYY 7 TOTAL SCORE: 1

## 2021-08-27 NOTE — TELEPHONE ENCOUNTER
Pending Prescriptions:                       Disp   Refills    citalopram (CELEXA) 40 MG tablet           30 tab*0        Sig: Take 1 tablet (40 mg) by mouth daily      Routing refill request to provider for review/approval because:  Labs out of range:  phq9    Sarah Cosme RN on 8/27/2021 at 1:29 PM

## 2021-08-30 ASSESSMENT — ANXIETY QUESTIONNAIRES
6. BECOMING EASILY ANNOYED OR IRRITABLE: SEVERAL DAYS
8. IF YOU CHECKED OFF ANY PROBLEMS, HOW DIFFICULT HAVE THESE MADE IT FOR YOU TO DO YOUR WORK, TAKE CARE OF THINGS AT HOME, OR GET ALONG WITH OTHER PEOPLE?: NOT DIFFICULT AT ALL
7. FEELING AFRAID AS IF SOMETHING AWFUL MIGHT HAPPEN: NOT AT ALL
GAD7 TOTAL SCORE: 1
3. WORRYING TOO MUCH ABOUT DIFFERENT THINGS: NOT AT ALL
2. NOT BEING ABLE TO STOP OR CONTROL WORRYING: NOT AT ALL
1. FEELING NERVOUS, ANXIOUS, OR ON EDGE: NOT AT ALL
GAD7 TOTAL SCORE: 1
7. FEELING AFRAID AS IF SOMETHING AWFUL MIGHT HAPPEN: NOT AT ALL
4. TROUBLE RELAXING: NOT AT ALL
GAD7 TOTAL SCORE: 1
5. BEING SO RESTLESS THAT IT IS HARD TO SIT STILL: NOT AT ALL

## 2021-08-30 ASSESSMENT — PATIENT HEALTH QUESTIONNAIRE - PHQ9
SUM OF ALL RESPONSES TO PHQ QUESTIONS 1-9: 4
10. IF YOU CHECKED OFF ANY PROBLEMS, HOW DIFFICULT HAVE THESE PROBLEMS MADE IT FOR YOU TO DO YOUR WORK, TAKE CARE OF THINGS AT HOME, OR GET ALONG WITH OTHER PEOPLE: SOMEWHAT DIFFICULT
SUM OF ALL RESPONSES TO PHQ QUESTIONS 1-9: 4

## 2021-08-31 ENCOUNTER — VIRTUAL VISIT (OUTPATIENT)
Dept: FAMILY MEDICINE | Facility: CLINIC | Age: 41
End: 2021-08-31
Payer: COMMERCIAL

## 2021-08-31 DIAGNOSIS — F32.1 MODERATE MAJOR DEPRESSION (H): ICD-10-CM

## 2021-08-31 DIAGNOSIS — R41.840 ATTENTION DEFICIT: Primary | ICD-10-CM

## 2021-08-31 DIAGNOSIS — F41.1 GAD (GENERALIZED ANXIETY DISORDER): ICD-10-CM

## 2021-08-31 PROCEDURE — 99214 OFFICE O/P EST MOD 30 MIN: CPT | Mod: GT | Performed by: NURSE PRACTITIONER

## 2021-08-31 RX ORDER — CITALOPRAM HYDROBROMIDE 40 MG/1
40 TABLET ORAL DAILY
Qty: 90 TABLET | Refills: 0 | Status: SHIPPED | OUTPATIENT
Start: 2021-08-31 | End: 2021-12-13

## 2021-08-31 RX ORDER — BUPROPION HYDROCHLORIDE 150 MG/1
150 TABLET ORAL EVERY MORNING
Qty: 90 TABLET | Refills: 0 | Status: SHIPPED | OUTPATIENT
Start: 2021-08-31 | End: 2021-12-13

## 2021-08-31 ASSESSMENT — ANXIETY QUESTIONNAIRES: GAD7 TOTAL SCORE: 1

## 2021-08-31 ASSESSMENT — PATIENT HEALTH QUESTIONNAIRE - PHQ9: SUM OF ALL RESPONSES TO PHQ QUESTIONS 1-9: 4

## 2021-09-01 ASSESSMENT — ASTHMA QUESTIONNAIRES: ACT_TOTALSCORE: 25

## 2021-09-03 ENCOUNTER — MYC MEDICAL ADVICE (OUTPATIENT)
Dept: FAMILY MEDICINE | Facility: CLINIC | Age: 41
End: 2021-09-03

## 2021-09-03 DIAGNOSIS — U07.1 INFECTION DUE TO 2019 NOVEL CORONAVIRUS: Primary | ICD-10-CM

## 2021-09-14 ENCOUNTER — MYC MEDICAL ADVICE (OUTPATIENT)
Dept: FAMILY MEDICINE | Facility: CLINIC | Age: 41
End: 2021-09-14

## 2021-09-14 ENCOUNTER — MYC MEDICAL ADVICE (OUTPATIENT)
Dept: FAMILY MEDICINE | Facility: OTHER | Age: 41
End: 2021-09-14

## 2021-09-14 NOTE — TELEPHONE ENCOUNTER
LM for the patient to return call. Please review message below. Sent [employer] message to the patient.     HEVER RodriguesN, RN, PHN  [place]  September 14, 2021

## 2021-09-14 NOTE — TELEPHONE ENCOUNTER
Provider please advise.    Patient started Wellbutrin 4 days ago and developed a rash on her arms and arm pits.    Sarah Cosme RN on 9/14/2021 at 10:26 AM

## 2021-09-14 NOTE — TELEPHONE ENCOUNTER
Call pt.   Not sure if related to medication. - would need eval.   Can hold for 1 week and re-start in another week to see if cause as an alternate. EMMA Langley CNP

## 2021-09-25 ENCOUNTER — HEALTH MAINTENANCE LETTER (OUTPATIENT)
Age: 41
End: 2021-09-25

## 2021-12-13 ENCOUNTER — OFFICE VISIT (OUTPATIENT)
Dept: FAMILY MEDICINE | Facility: CLINIC | Age: 41
End: 2021-12-13
Payer: COMMERCIAL

## 2021-12-13 VITALS
OXYGEN SATURATION: 99 % | HEIGHT: 67 IN | HEART RATE: 65 BPM | RESPIRATION RATE: 14 BRPM | DIASTOLIC BLOOD PRESSURE: 64 MMHG | TEMPERATURE: 97.4 F | BODY MASS INDEX: 27.44 KG/M2 | WEIGHT: 174.8 LBS | SYSTOLIC BLOOD PRESSURE: 102 MMHG

## 2021-12-13 DIAGNOSIS — F32.1 MODERATE MAJOR DEPRESSION (H): ICD-10-CM

## 2021-12-13 DIAGNOSIS — F41.1 GAD (GENERALIZED ANXIETY DISORDER): ICD-10-CM

## 2021-12-13 DIAGNOSIS — R41.840 ATTENTION DEFICIT: ICD-10-CM

## 2021-12-13 DIAGNOSIS — J45.21 MILD INTERMITTENT ASTHMA WITH EXACERBATION: ICD-10-CM

## 2021-12-13 DIAGNOSIS — Z30.41 ENCOUNTER FOR SURVEILLANCE OF CONTRACEPTIVE PILLS: ICD-10-CM

## 2021-12-13 DIAGNOSIS — Z00.00 ROUTINE GENERAL MEDICAL EXAMINATION AT A HEALTH CARE FACILITY: Primary | ICD-10-CM

## 2021-12-13 DIAGNOSIS — J45.20 INTERMITTENT ASTHMA, UNCOMPLICATED: ICD-10-CM

## 2021-12-13 DIAGNOSIS — N39.46 MIXED INCONTINENCE: ICD-10-CM

## 2021-12-13 PROCEDURE — G0145 SCR C/V CYTO,THINLAYER,RESCR: HCPCS | Performed by: NURSE PRACTITIONER

## 2021-12-13 PROCEDURE — 90471 IMMUNIZATION ADMIN: CPT | Performed by: NURSE PRACTITIONER

## 2021-12-13 PROCEDURE — 87624 HPV HI-RISK TYP POOLED RSLT: CPT | Performed by: NURSE PRACTITIONER

## 2021-12-13 PROCEDURE — 82947 ASSAY GLUCOSE BLOOD QUANT: CPT | Performed by: NURSE PRACTITIONER

## 2021-12-13 PROCEDURE — 99395 PREV VISIT EST AGE 18-39: CPT | Mod: 25 | Performed by: NURSE PRACTITIONER

## 2021-12-13 PROCEDURE — 36415 COLL VENOUS BLD VENIPUNCTURE: CPT | Performed by: NURSE PRACTITIONER

## 2021-12-13 PROCEDURE — 90732 PPSV23 VACC 2 YRS+ SUBQ/IM: CPT | Performed by: NURSE PRACTITIONER

## 2021-12-13 PROCEDURE — 84443 ASSAY THYROID STIM HORMONE: CPT | Performed by: NURSE PRACTITIONER

## 2021-12-13 PROCEDURE — 80061 LIPID PANEL: CPT | Performed by: NURSE PRACTITIONER

## 2021-12-13 PROCEDURE — 99214 OFFICE O/P EST MOD 30 MIN: CPT | Mod: 25 | Performed by: NURSE PRACTITIONER

## 2021-12-13 RX ORDER — BUPROPION HYDROCHLORIDE 150 MG/1
150 TABLET ORAL EVERY MORNING
Qty: 90 TABLET | Refills: 0 | Status: SHIPPED | OUTPATIENT
Start: 2021-12-13 | End: 2021-12-13

## 2021-12-13 RX ORDER — ALBUTEROL SULFATE 90 UG/1
2 AEROSOL, METERED RESPIRATORY (INHALATION) EVERY 6 HOURS
Qty: 18 G | Refills: 0 | Status: SHIPPED | OUTPATIENT
Start: 2021-12-13 | End: 2023-04-13

## 2021-12-13 RX ORDER — FLUTICASONE PROPIONATE AND SALMETEROL XINAFOATE 115; 21 UG/1; UG/1
2 AEROSOL, METERED RESPIRATORY (INHALATION) 2 TIMES DAILY
Qty: 12 G | Refills: 0 | Status: SHIPPED | OUTPATIENT
Start: 2021-12-13 | End: 2022-01-14

## 2021-12-13 RX ORDER — BUPROPION HYDROCHLORIDE 150 MG/1
150 TABLET ORAL EVERY MORNING
Qty: 90 TABLET | Refills: 1 | Status: SHIPPED | OUTPATIENT
Start: 2021-12-13 | End: 2023-04-13

## 2021-12-13 RX ORDER — NORETHINDRONE AND ETHINYL ESTRADIOL 1 MG-35MCG
KIT ORAL
Qty: 112 TABLET | Refills: 3 | Status: SHIPPED | OUTPATIENT
Start: 2021-12-13 | End: 2022-12-15

## 2021-12-13 ASSESSMENT — ENCOUNTER SYMPTOMS
EYE PAIN: 0
DIZZINESS: 0
DYSURIA: 0
ABDOMINAL PAIN: 0
NAUSEA: 0
ARTHRALGIAS: 0
COUGH: 0
SORE THROAT: 0
WEAKNESS: 0
HEADACHES: 0
PARESTHESIAS: 0
FREQUENCY: 0
CHILLS: 0
CONSTIPATION: 0
PALPITATIONS: 0
JOINT SWELLING: 0
FEVER: 0
HEMATURIA: 0
SHORTNESS OF BREATH: 0
HEARTBURN: 0
MYALGIAS: 0
HEMATOCHEZIA: 0
NERVOUS/ANXIOUS: 0
DIARRHEA: 0

## 2021-12-13 ASSESSMENT — MIFFLIN-ST. JEOR: SCORE: 1502.52

## 2021-12-13 NOTE — LETTER
My Asthma Action Plan    Name: Alyssa Manzano   YOB: 1980  Date: 12/13/2021   My doctor: EMMA Langley CNP   [facility]        My Rescue Medicine:   Albuterol inhaler (Proair/Ventolin/Proventil HFA)  2-4 puffs EVERY 4 HOURS as needed. Use a spacer if recommended by your provider.      Advair- use with any cold symptoms, twice daily.  My Asthma Severity:   Intermittent / Exercise Induced  Know your asthma triggers: upper respiratory infections  None          GREEN ZONE   Good Control    I feel good    No cough or wheeze    Can work, sleep and play without asthma symptoms       Take your asthma control medicine every day.     1. If exercise triggers your asthma, take your rescue medication    15 minutes before exercise or sports, and    During exercise if you have asthma symptoms  2. Spacer to use with inhaler: If you have a spacer, make sure to use it with your inhaler             YELLOW ZONE Getting Worse  I have ANY of these:    I do not feel good    Cough or wheeze    Chest feels tight    Wake up at night   1. Keep taking your Green Zone medications  2. Start taking your rescue medicine:    every 20 minutes for up to 1 hour. Then every 4 hours for 24-48 hours.  3. If you stay in the Yellow Zone for more than 12-24 hours, contact your doctor.  4. If you do not return to the Green Zone in 12-24 hours or you get worse, start taking your oral steroid medicine if prescribed by your provider.           RED ZONE Medical Alert - Get Help  I have ANY of these:    I feel awful    Medicine is not helping    Breathing getting harder    Trouble walking or talking    Nose opens wide to breathe       1. Take your rescue medicine NOW  2. If your provider has prescribed an oral steroid medicine, start taking it NOW  3. Call your doctor NOW  4. If you are still in the Red Zone after 20 minutes and you have not reached your doctor:    Take your rescue medicine again  and    Call 911 or go to the emergency room right away    See your regular doctor within 2 weeks of an Emergency Room or Urgent Care visit for follow-up treatment.          Annual Reminders:  Meet with Asthma Educator,  Flu Shot in the Fall, consider Pneumonia Vaccination for patients with asthma (aged 19 and older).    Pharmacy:    [facility] - [place], [place] - [address]  [id number] - [place], [place] - [address]  [facility] - [facility] [address] [facility], [address]    Electronically signed by EMMA Langley CNP   Date: 12/13/21                    Asthma Triggers  How To Control Things That Make Your Asthma Worse    Triggers are things that make your asthma worse.  Look at the list below to help you find your triggers and   what you can do about them. You can help prevent asthma flare-ups by staying away from your triggers.      Trigger                                                          What you can do   Cigarette Smoke  Tobacco smoke can make asthma worse. Do not allow smoking in your home, car or around you.  Be sure no one smokes at a child s day care or school.  If you smoke, ask your health care provider for ways to help you quit.  Ask family members to quit too.  Ask your health care provider for a referral to Quit Plan to help you quit smoking, or call 1-161-440-PLAN.     Colds, Flu, Bronchitis  These are common triggers of asthma. Wash your hands often.  Don t touch your eyes, nose or mouth.  Get a flu shot every year.     Dust Mites  These are tiny bugs that live in cloth or carpet. They are too small to see. Wash sheets and blankets in hot water every week.   Encase pillows and mattress in dust mite proof covers.  Avoid having carpet if you can. If you have carpet, vacuum weekly.   Use a dust mask and HEPA vacuum.   Pollen and Outdoor Mold  Some people are allergic to trees, grass, or weed pollen, or molds. Try to keep your windows  closed.  Limit time out doors when pollen count is high.   Ask you health care provider about taking medicine during allergy season.     Animal Dander  Some people are allergic to skin flakes, urine or saliva from pets with fur or feathers. Keep pets with fur or feathers out of your home.    If you can t keep the pet outdoors, then keep the pet out of your bedroom.  Keep the bedroom door closed.  Keep pets off cloth furniture and away from stuffed toys.     Mice, Rats, and Cockroaches  Some people are allergic to the waste from these pests.   Cover food and garbage.  Clean up spills and food crumbs.  Store grease in the refrigerator.   Keep food out of the bedroom.   Indoor Mold  This can be a trigger if your home has high moisture. Fix leaking faucets, pipes, or other sources of water.   Clean moldy surfaces.  Dehumidify basement if it is damp and smelly.   Smoke, Strong Odors, and Sprays  These can reduce air quality. Stay away from strong odors and sprays, such as perfume, powder, hair spray, paints, smoke incense, paint, cleaning products, candles and new carpet.   Exercise or Sports  Some people with asthma have this trigger. Be active!  Ask your doctor about taking medicine before sports or exercise to prevent symptoms.    Warm up for 5-10 minutes before and after sports or exercise.     Other Triggers of Asthma  Cold air:  Cover your nose and mouth with a scarf.  Sometimes laughing or crying can be a trigger.  Some medicines and food can trigger asthma.
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
## 2021-12-13 NOTE — NURSING NOTE
Prior to immunization administration, verified patients identity using patient s name and date of birth. Please see Immunization Activity for additional information.     Screening Questionnaire for Adult Immunization    Are you sick today?   No   Do you have allergies to medications, food, a vaccine component or latex?   No   Have you ever had a serious reaction after receiving a vaccination?   No   Do you have a long-term health problem with heart, lung, kidney, or metabolic disease (e.g., diabetes), asthma, a blood disorder, no spleen, complement component deficiency, a cochlear implant, or a spinal fluid leak?  Are you on long-term aspirin therapy?   No   Do you have cancer, leukemia, HIV/AIDS, or any other immune system problem?   No   Do you have a parent, brother, or sister with an immune system problem?   No   In the past 3 months, have you taken medications that affect  your immune system, such as prednisone, other steroids, or anticancer drugs; drugs for the treatment of rheumatoid arthritis, Crohn s disease, or psoriasis; or have you had radiation treatments?   No   Have you had a seizure, or a brain or other nervous system problem?   No   During the past year, have you received a transfusion of blood or blood    products, or been given immune (gamma) globulin or antiviral drug?   No   For women: Are you pregnant or is there a chance you could become       pregnant during the next month?   No   Have you received any vaccinations in the past 4 weeks?   Yes     Immunization questionnaire was positive for at least one answer.  Notified ok to give.         Patient instructed to remain in clinic for 15 minutes afterwards, and to report any adverse reaction to me immediately.       Screening performed by Melba Pereyra on 12/13/2021 at 4:57 PM.

## 2021-12-14 LAB
CHOLEST SERPL-MCNC: 144 MG/DL
FASTING STATUS PATIENT QL REPORTED: NO
FASTING STATUS PATIENT QL REPORTED: NO
GLUCOSE BLD-MCNC: 84 MG/DL (ref 70–99)
HDLC SERPL-MCNC: 48 MG/DL
LDLC SERPL CALC-MCNC: 58 MG/DL
NONHDLC SERPL-MCNC: 96 MG/DL
TRIGL SERPL-MCNC: 189 MG/DL
TSH SERPL DL<=0.005 MIU/L-ACNC: 2.96 MU/L (ref 0.4–4)

## 2021-12-14 ASSESSMENT — ASTHMA QUESTIONNAIRES: ACT_TOTALSCORE: 21

## 2021-12-14 NOTE — RESULT ENCOUNTER NOTE
Alyssa,  Your labs that have returned are normal. More labs are still processing. Tawana Handley, DNP

## 2021-12-15 ENCOUNTER — MYC MEDICAL ADVICE (OUTPATIENT)
Dept: FAMILY MEDICINE | Facility: CLINIC | Age: 41
End: 2021-12-15
Payer: COMMERCIAL

## 2021-12-15 RX ORDER — BUPROPION HYDROCHLORIDE 150 MG/1
TABLET ORAL
Qty: 90 TABLET | Refills: 0 | OUTPATIENT
Start: 2021-12-15

## 2021-12-15 NOTE — TELEPHONE ENCOUNTER
LM for the patient to return call to the clinic.   Sent mychart to the patient.     Good afternoon,     Oh no! It the area warm to the touch? Is it painful? Is there any swelling? Any fevers or difficult breathing? Any other symptoms? Has the redness increased in size? If so, I would advise that you are seen. I would call to schedule a visit or send Tawana an e-visit.       Take Care,       VENKAT Rodrigues, RN, PHN  [place]  December 15, 2021

## 2021-12-15 NOTE — TELEPHONE ENCOUNTER
Refused, rx was sent on 12/13/21 with #90 and 1 refill   Patient/Caregiver provided printed discharge information.

## 2021-12-16 LAB
BKR LAB AP GYN ADEQUACY: NORMAL
BKR LAB AP GYN INTERPRETATION: NORMAL
BKR LAB AP HPV REFLEX: NORMAL
BKR LAB AP LMP: NORMAL
BKR LAB AP PREVIOUS ABNORMAL: NORMAL
PATH REPORT.COMMENTS IMP SPEC: NORMAL
PATH REPORT.COMMENTS IMP SPEC: NORMAL
PATH REPORT.RELEVANT HX SPEC: NORMAL

## 2021-12-20 ENCOUNTER — PATIENT OUTREACH (OUTPATIENT)
Dept: FAMILY MEDICINE | Facility: CLINIC | Age: 41
End: 2021-12-20
Payer: COMMERCIAL

## 2021-12-20 DIAGNOSIS — R87.810 CERVICAL HIGH RISK HPV (HUMAN PAPILLOMAVIRUS) TEST POSITIVE: ICD-10-CM

## 2021-12-20 LAB
HUMAN PAPILLOMA VIRUS 16 DNA: NEGATIVE
HUMAN PAPILLOMA VIRUS 18 DNA: POSITIVE
HUMAN PAPILLOMA VIRUS FINAL DIAGNOSIS: ABNORMAL
HUMAN PAPILLOMA VIRUS OTHER HR: NEGATIVE

## 2021-12-20 NOTE — LETTER
April 14, 2022      Alyssa Manzano  [address] [address]        Dear ,    At [facility], your health and wellness are our primary concern. That is why we are following up on your most recent positive high-risk Human Papillomavirus (HPV) test.    Please call [phone] to schedule an appointment for your recommended follow-up Colposcopy (this cannot be scheduled through [facility]) at your earliest convenience.      If you have completed the appointment outside of the [facility] system, please have the records forwarded to our office. We will update your chart for your provider to review before your next annual wellness visit.     Thank you for choosing [facility]!      Sincerely,    Your [facility] Care Team

## 2021-12-21 ENCOUNTER — MYC MEDICAL ADVICE (OUTPATIENT)
Dept: FAMILY MEDICINE | Facility: CLINIC | Age: 41
End: 2021-12-21
Payer: COMMERCIAL

## 2021-12-21 NOTE — TELEPHONE ENCOUNTER
Please see Vidiowiki message.      Leticia Araya, BSN, RN, PHN  Registered Nurse-Clinic Triage  [facility]  12/21/2021 at 11:59 AM

## 2022-01-14 DIAGNOSIS — J45.21 MILD INTERMITTENT ASTHMA WITH EXACERBATION: ICD-10-CM

## 2022-01-14 RX ORDER — FLUTICASONE PROPIONATE AND SALMETEROL XINAFOATE 115; 21 UG/1; UG/1
AEROSOL, METERED RESPIRATORY (INHALATION)
Qty: 12 G | Refills: 0 | Status: SHIPPED | OUTPATIENT
Start: 2022-01-14 | End: 2023-04-13

## 2022-01-14 NOTE — TELEPHONE ENCOUNTER
Prescription approved per [facility] Refill Protocol.  Leticia Araya RN on 1/14/2022 at 12:54 PM

## 2022-03-02 ENCOUNTER — NURSE TRIAGE (OUTPATIENT)
Dept: NURSING | Facility: CLINIC | Age: 42
End: 2022-03-02
Payer: COMMERCIAL

## 2022-03-03 NOTE — TELEPHONE ENCOUNTER
"Nurse Triage SBAR    Is this a 2nd Level Triage? NO    Situation Calling with 24 hours of left shoulder, arm, upper back and lower neck pain. Also SOB and says it feels like someone is \"holding onto my heart with 2 fingers.\"    Background worked today but has this intermittent pain. Also had a dizzy episode today. States that it is more uncomfortable than painful.    Assessment Denies crushing chest pain but does complain of left sided arm, shoulder,, upper back and neck pain. Also, slight SOB. Feels worse when she sits upright.    Recommendation Emergency Department, Is currently outside [facility] but wanted to talk to someone first. Advised that she should go in and say exactly what she told me    Protocol Recommended Disposition: ED        Maureen Hudson RN [facility] 3/2/2022 7:54 PM          COVID 19 Nurse Triage Plan/Patient Instructions    Please be aware that novel coronavirus (COVID-19) may be circulating in the community. If you develop symptoms such as fever, cough, or SOB or if you have concerns about the presence of another infection including coronavirus (COVID-19), please contact your health care provider or visit [email or web address].     Disposition/Instructions    ED Visit recommended. Follow protocol based instructions.     Bring Your Own Device:  Please also bring your smart device(s) (smart phones, tablets, laptops) and their charging cables for your personal use and to communicate with your care team during your visit.    Thank you for taking steps to prevent the spread of this virus.  o Limit your contact with others.  o Wear a simple mask to cover your cough.  o Wash your hands well and often.    Resources    [facility]: About COVID-19: [email or web address]    CDC: What to Do If You're Sick: www.cdc.gov/coronavirus/2019-ncov/about/steps-when-sick.html    CDC: Ending Home Isolation: " www.cdc.gov/coronavirus/2019-ncov/hcp/disposition-in-home-patients.html     CDC: Caring for Someone: www.cdc.gov/coronavirus/2019-ncov/if-you-are-sick/care-for-someone.html     [facility]: Interim Guidance for Hospital Discharge to Home: www.health.[facility].[place].us/diseases/coronavirus/hcp/hospdischarge.pdf    [facility] clinical trials (COVID-19 research studies): [email or web address]/umn-[email or web address]-trials     Below are the COVID-19 hotlines at the [place] Department of Health ([facility]). Interpreters are available.   o For health questions: Call 754-672-0819 or 1-200.990.1962 (7 a.m. to 7 p.m.)  o For questions about schools and childcare: Call 534-373-6397 or 1-546.736.5879 (7 a.m. to 7 p.m.)                               Reason for Disposition    Pain also present in shoulder(s) or arm(s) or jaw  (Exception: pain is clearly made worse by movement)    Additional Information    Negative: Severe difficulty breathing (e.g., struggling for each breath, speaks in single words)    Negative: Difficult to awaken or acting confused (e.g., disoriented, slurred speech)    Negative: Shock suspected (e.g., cold/pale/clammy skin, too weak to stand, low BP, rapid pulse)    Negative: [1] Chest pain lasts > 5 minutes AND [2] history of heart disease  (i.e., heart attack, bypass surgery, angina, angioplasty, CHF; not just a heart murmur)    Negative: [1] Chest pain lasts > 5 minutes AND [2] described as crushing, pressure-like, or heavy    Negative: [1] Chest pain lasts > 5 minutes AND [2] age > 50    Negative: [1] Chest pain lasts > 5 minutes AND [2] age > 30 AND [3] at least one cardiac risk factor (i.e., hypertension, diabetes, obesity, smoker or strong family history of heart disease)    Negative: [1] Chest pain lasts > 5 minutes AND [2] not relieved with nitroglycerin    Negative: Passed out (i.e., lost consciousness, collapsed and was not responding)    Negative: Heart beating < 50 beats per minute OR > 140 beats per  "minute    Negative: Visible sweat on face or sweat dripping down face    Negative: Sounds like a life-threatening emergency to the triager    Negative: Followed a chest injury    Negative: SEVERE chest pain    Negative: [1] Intermittent  chest pain or \"angina\" AND [2] increasing in severity or frequency  (Exception: pains lasting a few seconds)    Protocols used: CHEST PAIN-A-AH      "

## 2022-04-14 NOTE — TELEPHONE ENCOUNTER
3/15/22 COLP--canceled  Patient due for Colposcopy.    Reminder done today via MyChart and letter.

## 2022-05-19 ENCOUNTER — OFFICE VISIT (OUTPATIENT)
Dept: OBGYN | Facility: CLINIC | Age: 42
End: 2022-05-19
Attending: OBSTETRICS & GYNECOLOGY
Payer: COMMERCIAL

## 2022-05-19 VITALS
DIASTOLIC BLOOD PRESSURE: 84 MMHG | WEIGHT: 160 LBS | SYSTOLIC BLOOD PRESSURE: 124 MMHG | HEART RATE: 77 BPM | BODY MASS INDEX: 24.97 KG/M2

## 2022-05-19 DIAGNOSIS — R87.810 CERVICAL HIGH RISK HUMAN PAPILLOMAVIRUS (HPV) DNA TEST POSITIVE: Primary | ICD-10-CM

## 2022-05-19 PROCEDURE — 57452 EXAM OF CERVIX W/SCOPE: CPT

## 2022-05-19 PROCEDURE — 88305 TISSUE EXAM BY PATHOLOGIST: CPT | Mod: TC | Performed by: OBSTETRICS & GYNECOLOGY

## 2022-05-19 PROCEDURE — 57455 BIOPSY OF CERVIX W/SCOPE: CPT | Mod: GC | Performed by: OBSTETRICS & GYNECOLOGY

## 2022-05-19 PROCEDURE — 57455 BIOPSY OF CERVIX W/SCOPE: CPT

## 2022-05-19 ASSESSMENT — PAIN SCALES - GENERAL: PAINLEVEL: NO PAIN (0)

## 2022-05-19 NOTE — LETTER
5/19/2022       RE: Alyssa Manzano  [address] [address]     Dear Colleague,    Thank you for referring your patient, Alyssa Manzano, to the [facility] [place] [facility]. Please see a copy of my visit note below.    No notes on file    Again, thank you for allowing me to participate in the care of your patient.      Sincerely,    Leisa No MD       From: Rohit Silva  To: Wade Fink MD  Sent: 8/24/2018 7:03 AM CDT  Subject: Prescription Question    Hi DrRoselia  How are you? Can you please change my Gabapentin to  300 my three times a day? Can I please get a prescription for  For 800 mg of ibuprofen?

## 2022-05-19 NOTE — LETTER
Date:May 27, 2022      Provider requested that no letter be sent. Do not send.       [facility]

## 2022-05-19 NOTE — PROCEDURES
"[facility] [[email or web address] Clinic  Colposcopy Procedure Note    41 year old  female presents for colposcopy due to pap smear on 21 showing NILM HPV 18 positive.    Pap Smear History:  3/2/07 LSIL  08 pap LSIL/ cannot exclude HSIL.  08 colpo- NINA 1. Plan: cotest in 1 year.  3/18/09 pap NIL  09 pap NIL.  Plan- f/u 1 year  11 ASCUS pap, negative HPV (per CE)  3/1/13 NIL pap (per CE)  14 NIL pap/neg HR HPV. Plan: pap in 3 years.  11/3/16 NIL pap/neg HR HPV. Plan:   21 NIL pap, + HR HPV 18. Plan: colposcopy due by 3/12/22  12/21/21 notified by phone  3/15/22 COLP--canceled  22 Reminder Mychart, Letter    Cervical Cancer Risk Factors:  - Smoking: not a smoker  - Sexual partners: one, fiance  - Immunosuppression: autoimmune neutropenia  - HPV vaccination status: vaccinated    Time Out - \"Pause for the Cause\"  Just before the procedure begins, through verbal and active participation of team members, verify:    Initials   Patient Name       Patient Date of Birth    Procedure to be performed       Procedure for colposcopy and biopsy has been explained to the patient.  Consent:  Risks, benefits of treatment, and no treatment were discussed.  Patient's questions were elicited and answered.  and Written consent signed and scanned into medical record.    Procedure:  Speculum placed in vagina and excellent visualization of cervix   acheived, cervix swabbed x 3 with acetic acid solution.    Findings:  Cervix:   - Squamocolumnar junction well visualized. Colposcopy satisfactory.   - Acetowhite changes at 1 o'clock - biopsy taken    Assessment:   Ms. Alyssa Manzano is a 41 year old  with HPV related changes.    PLAN:   -Specimens sent to pathology  -Will base further treatment on pathology findings.  -Post biopsy instructions given to patient    Leisa No MD  Ob/Gyn Resident, PGY-1  2022 2:54 PM     I was present and assisted throughout the procedure,  I agree with the note " above.  Belkys Heard MD

## 2022-05-19 NOTE — PROGRESS NOTES
"    Colposcopy Visit/Procedure Note:    Alyssa Manzano is an 41 year old, , who comes in for diagnosis of abnormal pap screen.     Menstrual History:  Menstrual History 2018 3/23/2018 2018   LAST MENSTRUAL PERIOD 2017       Lab Results   Component Value Date    PAP NIL 2016    PAP NIL 2014    PAP NIL 2009       Last   Lab Results   Component Value Date    HPV16 Negative 2021    HPV16 Negative 2016     Last   Lab Results   Component Value Date    HPV18 Positive 2021    HPV18 Negative 2016     Last   Lab Results   Component Value Date    HRHPV Negative 2021    HRHPV Negative 2016       Dates/results of previous cervical pathology: ***        Dates of previous cervical pathology treatment: ***    History   Smoking Status     Former Smoker     Packs/day: 0.00     Years: 1.00     Types: Cigarettes   Smokeless Tobacco     Never Used       Allergies as of 2022 - Reviewed 2022   Allergen Reaction Noted     No known drug allergies  2011        Colposcopy Procedure:    Consent:  Details of the colposcopic procedure were reviewed. Risks, benefits of treatment, and alternate forms of evaluation were discussed.  Patient's questions were elicited and answered.   Written consent was obtained and scanned into medical record.     Verification of Procedure  Just before the procedure begins, through verbal and active participation of team members, I verified:   Initials   Patient Name ***   Patient  ***   Procedure to be performed ***       OBJECTIVE: /84   Pulse 77   Wt 72.6 kg (160 lb)   Breastfeeding No   BMI 24.97 kg/m      Pelvic Exam:  EG/BUS: {UMP USPEC VULVA EXAM :876802::\"Normal genital architecture without lesions, erythema or abnormal secretions.\",\"Bartholin's, Urethra, Summerset's glands are normal.\"}   Vagina: {VAGINAL MUCOSA:248186::\"moist, pink, rugae\"} with " "{Color:878862::\"creamy\",\"white\",\"odorless\"}secretions  Cervix: {CERVIX :038547::\"no lesions\"}  Rectum:{RECTAL NEGATIVES LIST:463580::\"anus normal\"}    PROCEDURE:    Repeat Pap collected? If no, DELETE   {Repeat pap collected?  If no, DELETE:934927052}    Acetic acid and/or Lugol's solution applied to {Vulva/Vagina/Cervix:614775837}.  Colposcopic exam of the {Vulva/Vagina/Cervix:740677325} and apex of the vagina was conducted in the usual fashion.     Findings:  {SCJ/NA:963977842} was *** seen entirely and the exam ***satisfactory.    There were {COLPOSCOPY CERVIX FINDINGS:737187658}    Biopsies were *** obtained at *** and placed in labeled Formalin Jar.    {ECC/NA:835031776}: was *** obtained and placed in labeled Formalin Jar.    EMB done? If no, DELETE   {EMB done? If no, DELETE:894935767}    Assessment: {:733}    Plan:   {Gardisil vaccine:481228584}    Biopsies sent to pathology.  Will contact patient with results and recommended follow-up plan.  {Colposcopy Plan:972670486}    Patient advised to contact clinic with heavy vaginal bleeding, fever over 101 degrees F, or any other concerns.    Advised that evaluation of sexual contacts is NOT warranted as she can not get the same virus again. Risk of exposure to a NEW virus is possible with partner change.    Verbalized understanding and agreement with plan.        "

## 2022-05-23 LAB
PATH REPORT.COMMENTS IMP SPEC: NORMAL
PATH REPORT.COMMENTS IMP SPEC: NORMAL
PATH REPORT.FINAL DX SPEC: NORMAL
PATH REPORT.GROSS SPEC: NORMAL
PATH REPORT.MICROSCOPIC SPEC OTHER STN: NORMAL
PATH REPORT.RELEVANT HX SPEC: NORMAL
PHOTO IMAGE: NORMAL

## 2022-05-23 PROCEDURE — 88305 TISSUE EXAM BY PATHOLOGIST: CPT | Mod: 26 | Performed by: PATHOLOGY

## 2022-06-02 ENCOUNTER — PATIENT OUTREACH (OUTPATIENT)
Dept: FAMILY MEDICINE | Facility: CLINIC | Age: 42
End: 2022-06-02
Payer: COMMERCIAL

## 2022-06-20 ENCOUNTER — ONCOLOGY VISIT (OUTPATIENT)
Dept: ONCOLOGY | Facility: CLINIC | Age: 42
End: 2022-06-20
Attending: INTERNAL MEDICINE
Payer: COMMERCIAL

## 2022-06-20 ENCOUNTER — LAB (OUTPATIENT)
Dept: LAB | Facility: CLINIC | Age: 42
End: 2022-06-20
Payer: COMMERCIAL

## 2022-06-20 VITALS
OXYGEN SATURATION: 99 % | BODY MASS INDEX: 24.28 KG/M2 | DIASTOLIC BLOOD PRESSURE: 65 MMHG | TEMPERATURE: 98.9 F | HEART RATE: 60 BPM | WEIGHT: 155.6 LBS | SYSTOLIC BLOOD PRESSURE: 104 MMHG

## 2022-06-20 DIAGNOSIS — D70.8 AUTOIMMUNE NEUTROPENIA (H): Primary | ICD-10-CM

## 2022-06-20 DIAGNOSIS — D70.8 OTHER NEUTROPENIA (H): ICD-10-CM

## 2022-06-20 LAB
BASOPHILS # BLD MANUAL: 0 10E3/UL (ref 0–0.2)
BASOPHILS NFR BLD MANUAL: 0 %
EOSINOPHIL # BLD MANUAL: 0.1 10E3/UL (ref 0–0.7)
EOSINOPHIL NFR BLD MANUAL: 6 %
ERYTHROCYTE [DISTWIDTH] IN BLOOD BY AUTOMATED COUNT: 12.8 % (ref 10–15)
HCT VFR BLD AUTO: 39.1 % (ref 35–47)
HGB BLD-MCNC: 13 G/DL (ref 11.7–15.7)
LYMPHOCYTES # BLD MANUAL: 0.6 10E3/UL (ref 0.8–5.3)
LYMPHOCYTES NFR BLD MANUAL: 60 %
MCH RBC QN AUTO: 31.9 PG (ref 26.5–33)
MCHC RBC AUTO-ENTMCNC: 33.2 G/DL (ref 31.5–36.5)
MCV RBC AUTO: 96 FL (ref 78–100)
MONOCYTES # BLD MANUAL: 0.3 10E3/UL (ref 0–1.3)
MONOCYTES NFR BLD MANUAL: 26 %
NEUTROPHILS # BLD MANUAL: 0.1 10E3/UL (ref 1.6–8.3)
NEUTROPHILS NFR BLD MANUAL: 8 %
PLAT MORPH BLD: ABNORMAL
PLATELET # BLD AUTO: 182 10E3/UL (ref 150–450)
RBC # BLD AUTO: 4.07 10E6/UL (ref 3.8–5.2)
RBC MORPH BLD: ABNORMAL
WBC # BLD AUTO: 1 10E3/UL (ref 4–11)

## 2022-06-20 PROCEDURE — 85027 COMPLETE CBC AUTOMATED: CPT

## 2022-06-20 PROCEDURE — 99214 OFFICE O/P EST MOD 30 MIN: CPT | Performed by: INTERNAL MEDICINE

## 2022-06-20 PROCEDURE — 85007 BL SMEAR W/DIFF WBC COUNT: CPT

## 2022-06-20 PROCEDURE — 36415 COLL VENOUS BLD VENIPUNCTURE: CPT

## 2022-06-20 RX ORDER — FILGRASTIM-SNDZ 300 UG/.5ML
300 INJECTION, SOLUTION INTRAVENOUS; SUBCUTANEOUS WEEKLY
Qty: 0.5 ML | Refills: 3 | Status: SHIPPED | OUTPATIENT
Start: 2022-06-20 | End: 2022-08-16

## 2022-06-20 ASSESSMENT — PAIN SCALES - GENERAL: PAINLEVEL: NO PAIN (0)

## 2022-06-20 NOTE — LETTER
2022         RE: Alyssa Manzano  [address] [facility] [address]        Dear Colleague,    Thank you for referring your patient, Alyssa Manzano, to the [facility] [place]. Please see a copy of my visit note below.    [facility] Hematology / Oncology  Progress Note  Name: Alyssa Manzano  :  1980    MRN:  [id number]    --------------------    Assessment / Plan:  Autoimmune neutropenia, G-CSF responsive.    Alyssa and I reviewed a plan for her autoimmune neutropenia, G-CSF responsive.  I am anticipating her values being low given that she has been about 2 weeks off all therapy.  Once we get back on treatment, I would like to see her wean back on the G-CSF at 300 mcg even weekly to try to get a hold of some of the fatigue and body aches that she experiences twice weekly associated with the 480 mcg dosing twice weekly.  We really just need her white blood cell count to have an ANC above 1.5 and need to weigh this against the toxicity of fatigue and body pains associated with G-CSF therapy.  She is in agreement with this plan.  Assuming her CBC is stable on 300 mcg weekly dosing, we will plan to recheck labs in 3 in 6 months and see her back in 12 months time.  We also reviewed LOIS given her immunocompromise status including logistics, pros and cons of administration; will show will think about it; she notes she is due for an additional COVID booster as well.    ADDENDUM: ANC came back at 0.1.  Left a detailed message for Alyssa to resume 480 mcg dosing twice weekly for at least the next week or 2 with plans to recheck a CBC in a week or so to ensure normalization.  In a way this was expected given that she has been off treatment for a couple weeks but is much more severe than would have expected also.    Jake Parker MD    --------------------    Interval History:  Alyssa returns for follow up of autoimmune neutropenia.  All in all she  remains as well as she is felt in some time now.  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  She is busy raising 3 kids ages 7 through 11.  She is been off G-CSF for 2 weeks trying to see if her white count will decline off therapy.  But she has been scaling back on the G-CSF administration because it makes her fatigued and wiped out the next day and causes bone pains.  Her canker sores have been controlled recently.  No other infectious complications such as ear infections, sore throat, sinus infections, pneumonia, urinary tract infections or other mucocutaneous infections.    --------------------    Physical Exam:  VS: /65 (BP Location: Left arm, Patient Position: Sitting)   Pulse 60   Temp 98.9  F (37.2  C) (Oral)   Wt 70.6 kg (155 lb 9.6 oz)   SpO2 99%   BMI 24.28 kg/m    GEN: Well appearing.    Labs / Imaging:  We reviewed two trend and CBC and prior work-up including CAT, rheumatoid factor, B12, folic acid, LDH, hemolysis markers, bone marrow biopsy.      Again, thank you for allowing me to participate in the care of your patient.        Sincerely,        Jake Parker MD

## 2022-06-20 NOTE — PROGRESS NOTES
[facility] Hematology / Oncology  Progress Note  Name: Alyssa Manzano  :  1980    MRN:  [id number]    --------------------    Assessment / Plan:  Autoimmune neutropenia, G-CSF responsive.    Alyssa and I reviewed a plan for her autoimmune neutropenia, G-CSF responsive.  I am anticipating her values being low given that she has been about 2 weeks off all therapy.  Once we get back on treatment, I would like to see her wean back on the G-CSF at 300 mcg even weekly to try to get a hold of some of the fatigue and body aches that she experiences twice weekly associated with the 480 mcg dosing twice weekly.  We really just need her white blood cell count to have an ANC above 1.5 and need to weigh this against the toxicity of fatigue and body pains associated with G-CSF therapy.  She is in agreement with this plan.  Assuming her CBC is stable on 300 mcg weekly dosing, we will plan to recheck labs in 3 in 6 months and see her back in 12 months time.  We also reviewed EVUSHELD given her immunocompromise status including logistics, pros and cons of administration; will show will think about it; she notes she is due for an additional COVID booster as well.    ADDENDUM: ANC came back at 0.1.  Left a detailed message for Alyssa to resume 480 mcg dosing twice weekly for at least the next week or 2 with plans to recheck a CBC in a week or so to ensure normalization.  In a way this was expected given that she has been off treatment for a couple weeks but is much more severe than would have expected also.    Jake Parker MD    --------------------    Interval History:  Alyssa returns for follow up of autoimmune neutropenia.  All in all she remains as well as she is felt in some time now.  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  She is busy raising 3 kids ages 7 through 11.  She is been off G-CSF for 2 weeks trying to see if her white count will decline off therapy.  But  she has been scaling back on the G-CSF administration because it makes her fatigued and wiped out the next day and causes bone pains.  Her canker sores have been controlled recently.  No other infectious complications such as ear infections, sore throat, sinus infections, pneumonia, urinary tract infections or other mucocutaneous infections.    --------------------    Physical Exam:  VS: /65 (BP Location: Left arm, Patient Position: Sitting)   Pulse 60   Temp 98.9  F (37.2  C) (Oral)   Wt 70.6 kg (155 lb 9.6 oz)   SpO2 99%   BMI 24.28 kg/m    GEN: Well appearing.    Labs / Imaging:  We reviewed two trend and CBC and prior work-up including CAT, rheumatoid factor, B12, folic acid, LDH, hemolysis markers, bone marrow biopsy.

## 2022-06-20 NOTE — PATIENT INSTRUCTIONS
1) CBC 7-14 days.  2) CBC 6-8 weeks.  3) CBC 6 months.  4) BJT MG 12 months 15 mins w/ CBC.    Jake Parker MD.

## 2022-06-20 NOTE — NURSING NOTE
"Oncology Rooming Note    June 20, 2022 3:21 PM   Alyssa Manzano is a 41 year old female who presents for:    Chief Complaint   Patient presents with     Oncology Clinic Visit     Initial Vitals: /65 (BP Location: Left arm, Patient Position: Sitting)   Pulse 60   Temp 98.9  F (37.2  C) (Oral)   Wt 70.6 kg (155 lb 9.6 oz)   SpO2 99%   BMI 24.28 kg/m   Estimated body mass index is 24.28 kg/m  as calculated from the following:    Height as of 12/13/21: 1.705 m (5' 7.13\").    Weight as of this encounter: 70.6 kg (155 lb 9.6 oz). Body surface area is 1.83 meters squared.  No Pain (0) Comment: Data Unavailable   No LMP recorded. (Menstrual status: Birth Control).  Allergies reviewed: Yes  Medications reviewed: Yes    Medications: Medication refills not needed today.  Pharmacy name entered into EPIC:    [facility] - [place], [place] - [id number] - [place], [place] - [address]  [facility] - [place], [place] - [address]          Isabel Crowder LPN              "

## 2022-06-29 ENCOUNTER — LAB (OUTPATIENT)
Dept: LAB | Facility: CLINIC | Age: 42
End: 2022-06-29
Payer: COMMERCIAL

## 2022-06-29 DIAGNOSIS — D70.8 AUTOIMMUNE NEUTROPENIA (H): ICD-10-CM

## 2022-06-29 LAB
BASOPHILS # BLD AUTO: 0 10E3/UL (ref 0–0.2)
BASOPHILS NFR BLD AUTO: 1 %
EOSINOPHIL # BLD AUTO: 0.1 10E3/UL (ref 0–0.7)
EOSINOPHIL NFR BLD AUTO: 4 %
ERYTHROCYTE [DISTWIDTH] IN BLOOD BY AUTOMATED COUNT: 12.3 % (ref 10–15)
HCT VFR BLD AUTO: 39 % (ref 35–47)
HGB BLD-MCNC: 13.3 G/DL (ref 11.7–15.7)
IMM GRANULOCYTES # BLD: 0 10E3/UL
IMM GRANULOCYTES NFR BLD: 1 %
LYMPHOCYTES # BLD AUTO: 1.2 10E3/UL (ref 0.8–5.3)
LYMPHOCYTES NFR BLD AUTO: 50 %
MCH RBC QN AUTO: 31.5 PG (ref 26.5–33)
MCHC RBC AUTO-ENTMCNC: 34.1 G/DL (ref 31.5–36.5)
MCV RBC AUTO: 92 FL (ref 78–100)
MONOCYTES # BLD AUTO: 0.3 10E3/UL (ref 0–1.3)
MONOCYTES NFR BLD AUTO: 15 %
NEUTROPHILS # BLD AUTO: 0.7 10E3/UL (ref 1.6–8.3)
NEUTROPHILS NFR BLD AUTO: 29 %
NRBC # BLD AUTO: 0 10E3/UL
NRBC BLD AUTO-RTO: 0 /100
PLATELET # BLD AUTO: 156 10E3/UL (ref 150–450)
RBC # BLD AUTO: 4.22 10E6/UL (ref 3.8–5.2)
WBC # BLD AUTO: 2.4 10E3/UL (ref 4–11)

## 2022-06-29 PROCEDURE — 36415 COLL VENOUS BLD VENIPUNCTURE: CPT

## 2022-06-29 PROCEDURE — 85025 COMPLETE CBC W/AUTO DIFF WBC: CPT

## 2022-07-15 ENCOUNTER — TELEPHONE (OUTPATIENT)
Dept: ONCOLOGY | Facility: CLINIC | Age: 42
End: 2022-07-15

## 2022-07-15 NOTE — TELEPHONE ENCOUNTER
Copay card Approved for Zarxio 300mcg  Effective Dates: 07/15/2022-12/31/2022   Patient notified? Yes, lvm to call me back  Additional Information: copay will be $0 a fill will pay up to $10,000 a year

## 2022-07-22 DIAGNOSIS — D70.8 OTHER NEUTROPENIA (H): ICD-10-CM

## 2022-07-22 RX ORDER — FILGRASTIM-SNDZ 480 UG/.8ML
INJECTION, SOLUTION INTRAVENOUS; SUBCUTANEOUS
Qty: 0.8 ML | Refills: 11 | Status: CANCELLED | OUTPATIENT
Start: 2022-07-22

## 2022-08-09 ENCOUNTER — MYC MEDICAL ADVICE (OUTPATIENT)
Dept: ONCOLOGY | Facility: CLINIC | Age: 42
End: 2022-08-09

## 2022-08-09 DIAGNOSIS — D70.8 AUTOIMMUNE NEUTROPENIA (H): ICD-10-CM

## 2022-08-16 RX ORDER — FILGRASTIM-SNDZ 300 UG/.5ML
300 INJECTION, SOLUTION INTRAVENOUS; SUBCUTANEOUS WEEKLY
Qty: 0.5 ML | Refills: 3 | Status: SHIPPED | OUTPATIENT
Start: 2022-08-16 | End: 2022-09-07

## 2022-08-16 NOTE — TELEPHONE ENCOUNTER
Finance, Vera, spoke with patient. Patient:  wants to get this [Zarxio} filled at [facility] since they can get this moving and filled for her at the $0 copay and might still be able to get it to her tonight. Can you put another order in for the [facility] now since the other pharmacy couldn't get it in or didn't know what to do with a copay card.     Routing to Dr Parker for possible refill of Zarxio 300 mcg/0.5ml.    Vandana Wright RN on 8/16/2022 at 4:03 PM

## 2022-08-16 NOTE — TELEPHONE ENCOUNTER
Routing to oncology pharmacy to assist with dose and quantity.    Vandana Wright RN on 8/16/2022 at 10:04 AM

## 2022-08-16 NOTE — TELEPHONE ENCOUNTER
Vera from [employer] reached out to Alyssa again, waiting for her to call back. Vera has a copay card set up since July that will make patient's copays $0 a fill but patient has to call Vera back.       Responded to patient's [employer] message requesting she call Vera, from [employer], back before writing a new prescription.    Vandana Wright RN on 8/16/2022 at 11:44 AM

## 2022-08-16 NOTE — TELEPHONE ENCOUNTER
Please ask pharmacy to sort things out since they were working on grants to save money.    Jake Parker MD.

## 2022-08-16 NOTE — TELEPHONE ENCOUNTER
Alyssa called back and would like her script filled at [facility] she has been out for two weeks now and needs a rush. Will put a message in to the team to get this moving asap

## 2022-09-07 DIAGNOSIS — D70.8 AUTOIMMUNE NEUTROPENIA (H): ICD-10-CM

## 2022-09-07 RX ORDER — FILGRASTIM-SNDZ 300 UG/.5ML
300 INJECTION, SOLUTION INTRAVENOUS; SUBCUTANEOUS WEEKLY
Qty: 0.5 ML | Refills: 3 | Status: SHIPPED | OUTPATIENT
Start: 2022-09-07 | End: 2022-12-29

## 2022-09-07 NOTE — TELEPHONE ENCOUNTER
Called [facility] whom states Zarxio was mailed to patient on 8/17/22 for $0 copay.    Sent [employer] message to patient requesting she schedule a lab appointment to have CBC checked within the next couple of weeks.    Vandana Wright RN on 9/7/2022 at 1:47 PM

## 2022-09-07 NOTE — TELEPHONE ENCOUNTER
I just want to confirm that this dosing is appropriate after all the issues with trying to find a better financial solution.    And have we checked a CBC in a while?    Jake Parker MD.

## 2022-09-16 ENCOUNTER — LAB (OUTPATIENT)
Dept: LAB | Facility: CLINIC | Age: 42
End: 2022-09-16
Payer: COMMERCIAL

## 2022-09-16 DIAGNOSIS — D70.8 AUTOIMMUNE NEUTROPENIA (H): ICD-10-CM

## 2022-09-16 LAB
BASOPHILS # BLD AUTO: 0 10E3/UL (ref 0–0.2)
BASOPHILS NFR BLD AUTO: 1 %
EOSINOPHIL # BLD AUTO: 0.1 10E3/UL (ref 0–0.7)
EOSINOPHIL NFR BLD AUTO: 1 %
ERYTHROCYTE [DISTWIDTH] IN BLOOD BY AUTOMATED COUNT: 12.2 % (ref 10–15)
HCT VFR BLD AUTO: 39.2 % (ref 35–47)
HGB BLD-MCNC: 13 G/DL (ref 11.7–15.7)
IMM GRANULOCYTES # BLD: 0 10E3/UL
IMM GRANULOCYTES NFR BLD: 0 %
LYMPHOCYTES # BLD AUTO: 1.2 10E3/UL (ref 0.8–5.3)
LYMPHOCYTES NFR BLD AUTO: 30 %
MCH RBC QN AUTO: 31.4 PG (ref 26.5–33)
MCHC RBC AUTO-ENTMCNC: 33.2 G/DL (ref 31.5–36.5)
MCV RBC AUTO: 95 FL (ref 78–100)
MONOCYTES # BLD AUTO: 0.4 10E3/UL (ref 0–1.3)
MONOCYTES NFR BLD AUTO: 11 %
NEUTROPHILS # BLD AUTO: 2.2 10E3/UL (ref 1.6–8.3)
NEUTROPHILS NFR BLD AUTO: 57 %
PLATELET # BLD AUTO: 244 10E3/UL (ref 150–450)
RBC # BLD AUTO: 4.14 10E6/UL (ref 3.8–5.2)
WBC # BLD AUTO: 3.9 10E3/UL (ref 4–11)

## 2022-09-16 PROCEDURE — 85025 COMPLETE CBC W/AUTO DIFF WBC: CPT

## 2022-09-16 PROCEDURE — 36415 COLL VENOUS BLD VENIPUNCTURE: CPT

## 2022-10-11 NOTE — NURSING NOTE
"Alyssa Manzano is a 39 year old female who is being evaluated via a billable video visit.      The patient has been notified of following:     \"This video visit will be conducted via a call between you and your physician/provider. We have found that certain health care needs can be provided without the need for an in-person physical exam.  This service lets us provide the care you need with a video conversation.  If a prescription is necessary we can send it directly to your pharmacy.  If lab work is needed we can place an order for that and you can then stop by our lab to have the test done at a later time.    Video visits are billed at different rates depending on your insurance coverage.  Please reach out to your insurance provider with any questions.    If during the course of the call the physician/provider feels a video visit is not appropriate, you will not be charged for this service.\"    Patient has given verbal consent for Video visit? Yes    Will anyone else be joining your video visit? No        Video-Visit Details    Type of service:  Video Visit    Originating Location (pt. Location): Home    Distant Location (provider location):  [facility]     Platform used for Video Visit: Simon     Checklist Positives   Anxiety   Trouble Sleeping - takes OTC sleep aids    No Concerns    Juanis Kelsey CMA      "
no

## 2022-11-08 ENCOUNTER — ANCILLARY PROCEDURE (OUTPATIENT)
Dept: MAMMOGRAPHY | Facility: CLINIC | Age: 42
End: 2022-11-08
Attending: NURSE PRACTITIONER
Payer: COMMERCIAL

## 2022-11-08 DIAGNOSIS — Z12.31 VISIT FOR SCREENING MAMMOGRAM: ICD-10-CM

## 2022-11-08 PROCEDURE — 77063 BREAST TOMOSYNTHESIS BI: CPT | Performed by: RADIOLOGY

## 2022-11-08 PROCEDURE — 77067 SCR MAMMO BI INCL CAD: CPT | Performed by: RADIOLOGY

## 2022-12-14 DIAGNOSIS — Z30.41 ENCOUNTER FOR SURVEILLANCE OF CONTRACEPTIVE PILLS: ICD-10-CM

## 2022-12-14 NOTE — TELEPHONE ENCOUNTER
"Pending Prescriptions:                       Disp   Refills    NORTREL 1/35, 28, 1-35 MG-MCG tablet [Phar*112 ta*3        Sig: TAKE ONE TABLET BY MOUTH ONCE DAILY CONTINUOUSLY    Routing refill request to provider for review/approval because:  Patient needs to be seen because it has been more than 1 year since last office visit.    Requested Prescriptions   Pending Prescriptions Disp Refills    NORTREL 1/35, 28, 1-35 MG-MCG tablet [Pharmacy Med Name: NORTREL 1/35 (28) 1-35MG-MCG TABS] 112 tablet 3     Sig: TAKE ONE TABLET BY MOUTH ONCE DAILY CONTINUOUSLY       Contraceptives Protocol Failed - 12/14/2022  5:04 AM        Failed - Recent (12 mo) or future (30 days) visit within the authorizing provider's specialty     Patient has had an office visit with the authorizing provider or a provider within the authorizing providers department within the previous 12 mos or has a future within next 30 days. See \"Patient Info\" tab in [employer]Reconnexsket, or \"Choose Columns\" in Meds & Orders section of the refill encounter.              Passed - Patient is not a current smoker if age is 35 or older        Passed - Medication is active on med list        Passed - No active pregnancy on record        Passed - No positive pregnancy test in past 12 months       Hormone Replacement Therapy Failed - 12/14/2022  5:04 AM        Failed - Recent (12 mo) or future (30 days) visit within the authorizing provider's specialty     Patient has had an office visit with the authorizing provider or a provider within the authorizing providers department within the previous 12 mos or has a future within next 30 days. See \"Patient Info\" tab in inbasket, or \"Choose Columns\" in Meds & Orders section of the refill encounter.              Passed - Blood pressure under 140/90 in past 12 months     BP Readings from Last 3 Encounters:   06/20/22 104/65   05/19/22 124/84   12/13/21 102/64                 Passed - Medication is active on med list        Passed - Patient " is 18 years of age or older        Passed - No active pregnancy on record        Passed - No positive pregnancy test on record in past 12 months

## 2022-12-15 RX ORDER — NORETHINDRONE AND ETHINYL ESTRADIOL 1 MG-35MCG
KIT ORAL
Qty: 28 TABLET | Refills: 0 | Status: SHIPPED | OUTPATIENT
Start: 2022-12-15 | End: 2023-01-09

## 2022-12-20 ENCOUNTER — TELEPHONE (OUTPATIENT)
Dept: ONCOLOGY | Facility: CLINIC | Age: 42
End: 2022-12-20

## 2022-12-20 NOTE — TELEPHONE ENCOUNTER
Copy card Approved    Medication Zarxio  Amount/ $ 10,000  [facility]  Phone # [phone]  Fax # [phone]  Effective Dates (verified over the phone) 01/01/23-12/31/23  Additional Information BIN:[id number], ID:[id number], GRP:[id number]  Patient notified? yes

## 2022-12-27 DIAGNOSIS — D70.8 AUTOIMMUNE NEUTROPENIA (H): ICD-10-CM

## 2022-12-29 RX ORDER — FILGRASTIM-SNDZ 300 UG/.5ML
INJECTION, SOLUTION INTRAVENOUS; SUBCUTANEOUS
Qty: 2 ML | Refills: 5 | Status: SHIPPED | OUTPATIENT
Start: 2022-12-29 | End: 2023-06-30

## 2022-12-29 NOTE — TELEPHONE ENCOUNTER
Patient due for CBC - MyChart message to patient advising to schedule.        filgrastim-sndz (ZARXIO) 300 MCG/0.5ML syringe  Last Written Prescription Date:  9/7/2022  Last Fill Quantity: 0.5 mL,  # refills: 3   Last office visit:  with prescribing provider   Future Office Visit:  N/A - advised to schedule    Routing refill request to provider for review/approval.       Flower Mcbride RN

## 2023-01-06 DIAGNOSIS — Z30.41 ENCOUNTER FOR SURVEILLANCE OF CONTRACEPTIVE PILLS: ICD-10-CM

## 2023-01-07 ENCOUNTER — HEALTH MAINTENANCE LETTER (OUTPATIENT)
Age: 43
End: 2023-01-07

## 2023-01-09 RX ORDER — NORETHINDRONE AND ETHINYL ESTRADIOL 1 MG-35MCG
KIT ORAL
Qty: 28 TABLET | Refills: 0 | Status: SHIPPED | OUTPATIENT
Start: 2023-01-09 | End: 2023-04-13

## 2023-01-09 NOTE — TELEPHONE ENCOUNTER
"Pending Prescriptions:                       Disp   Refills    NORTREL 1/35, 28, 1-35 MG-MCG tablet [Phar*28 tab*0        Sig: TAKE ONE TABLET BY MOUTH ONCE DAILY ACTIVE PILLS           CONTINUOUSLY    Routing refill request to provider for review/approval because:  Genevieve given x1 and patient did not follow up, please advise    Requested Prescriptions   Pending Prescriptions Disp Refills    NORTREL 1/35, 28, 1-35 MG-MCG tablet [Pharmacy Med Name: NORTREL 1/35 (28) 1-35MG-MCG TABS] 28 tablet 0     Sig: TAKE ONE TABLET BY MOUTH ONCE DAILY ACTIVE PILLS CONTINUOUSLY       Contraceptives Protocol Passed - 1/6/2023  5:03 AM        Passed - Patient is not a current smoker if age is 35 or older        Passed - Recent (12 mo) or future (30 days) visit within the authorizing provider's specialty     Patient has had an office visit with the authorizing provider or a provider within the authorizing providers department within the previous 12 mos or has a future within next 30 days. See \"Patient Info\" tab in inWellbeet, or \"Choose Columns\" in Meds & Orders section of the refill encounter.              Passed - Medication is active on med list        Passed - No active pregnancy on record        Passed - No positive pregnancy test in past 12 months       Hormone Replacement Therapy Passed - 1/6/2023  5:03 AM        Passed - Blood pressure under 140/90 in past 12 months     BP Readings from Last 3 Encounters:   06/20/22 104/65   05/19/22 124/84   12/13/21 102/64                 Passed - Recent (12 mo) or future (30 days) visit within the authorizing provider's specialty     Patient has had an office visit with the authorizing provider or a provider within the authorizing providers department within the previous 12 mos or has a future within next 30 days. See \"Patient Info\" tab in inRadarChilesket, or \"Choose Columns\" in Meds & Orders section of the refill encounter.              Passed - Medication is active on med list        Passed - " Patient is 18 years of age or older        Passed - No active pregnancy on record        Passed - No positive pregnancy test on record in past 12 months

## 2023-01-30 DIAGNOSIS — Z30.41 ENCOUNTER FOR SURVEILLANCE OF CONTRACEPTIVE PILLS: ICD-10-CM

## 2023-02-01 NOTE — TELEPHONE ENCOUNTER
Pending Prescriptions:                       Disp   Refills    NORTREL 1/35, 28, 1-35 MG-MCG tablet [Phar*28 tab*0        Sig: TAKE ONE TABLET BY MOUTH ONCE DAILY CONTINUOUSLY    Routing refill request to provider for review/approval because:  Patient needs to be seen because it has been more than 1 year since last office visit.

## 2023-02-02 RX ORDER — NORETHINDRONE AND ETHINYL ESTRADIOL 1 MG-35MCG
KIT ORAL
Qty: 28 TABLET | Refills: 0 | OUTPATIENT
Start: 2023-02-02

## 2023-04-10 ENCOUNTER — MYC MEDICAL ADVICE (OUTPATIENT)
Dept: FAMILY MEDICINE | Facility: CLINIC | Age: 43
End: 2023-04-10
Payer: COMMERCIAL

## 2023-04-10 ASSESSMENT — ENCOUNTER SYMPTOMS
FEVER: 0
HEMATURIA: 0
NAUSEA: 0
EYE PAIN: 0
HEMATOCHEZIA: 0
DIZZINESS: 0
FREQUENCY: 0
WEAKNESS: 0
ABDOMINAL PAIN: 0
PARESTHESIAS: 0
MYALGIAS: 0
CHILLS: 0
PALPITATIONS: 0
ARTHRALGIAS: 0
HEARTBURN: 0
HEADACHES: 0
NERVOUS/ANXIOUS: 0
JOINT SWELLING: 0
BREAST MASS: 0
SHORTNESS OF BREATH: 0
DIARRHEA: 0
DYSURIA: 0
SORE THROAT: 0
COUGH: 0
CONSTIPATION: 0

## 2023-04-10 NOTE — TELEPHONE ENCOUNTER
Updated appt information and type. Also canceled additional appt.     Dharmesh Jolley MA on 4/10/2023 at 2:35 PM

## 2023-04-13 ENCOUNTER — OFFICE VISIT (OUTPATIENT)
Dept: FAMILY MEDICINE | Facility: CLINIC | Age: 43
End: 2023-04-13
Payer: COMMERCIAL

## 2023-04-13 VITALS
TEMPERATURE: 97.7 F | RESPIRATION RATE: 12 BRPM | HEART RATE: 66 BPM | OXYGEN SATURATION: 100 % | HEIGHT: 67 IN | WEIGHT: 161.2 LBS | BODY MASS INDEX: 25.3 KG/M2 | SYSTOLIC BLOOD PRESSURE: 93 MMHG | DIASTOLIC BLOOD PRESSURE: 58 MMHG

## 2023-04-13 DIAGNOSIS — D70.8 AUTOIMMUNE NEUTROPENIA (H): ICD-10-CM

## 2023-04-13 DIAGNOSIS — Z00.00 ROUTINE GENERAL MEDICAL EXAMINATION AT A HEALTH CARE FACILITY: Primary | ICD-10-CM

## 2023-04-13 DIAGNOSIS — F33.42 RECURRENT MAJOR DEPRESSION IN COMPLETE REMISSION (H): ICD-10-CM

## 2023-04-13 DIAGNOSIS — B97.7 HPV IN FEMALE: ICD-10-CM

## 2023-04-13 DIAGNOSIS — N92.0 MENORRHAGIA WITH REGULAR CYCLE: ICD-10-CM

## 2023-04-13 DIAGNOSIS — Z12.4 CERVICAL CANCER SCREENING: ICD-10-CM

## 2023-04-13 PROBLEM — F32.1 MODERATE MAJOR DEPRESSION (H): Status: ACTIVE | Noted: 2023-04-13

## 2023-04-13 PROBLEM — F32.1 MODERATE MAJOR DEPRESSION (H): Status: RESOLVED | Noted: 2023-04-13 | Resolved: 2023-04-13

## 2023-04-13 LAB
CHOLEST SERPL-MCNC: 155 MG/DL
FASTING STATUS PATIENT QL REPORTED: YES
HBA1C MFR BLD: 4.7 % (ref 0–5.6)
HDLC SERPL-MCNC: 72 MG/DL
LDLC SERPL CALC-MCNC: 66 MG/DL
NONHDLC SERPL-MCNC: 83 MG/DL
TRIGL SERPL-MCNC: 85 MG/DL
TSH SERPL DL<=0.005 MIU/L-ACNC: 1.91 MU/L (ref 0.4–4)

## 2023-04-13 PROCEDURE — 84443 ASSAY THYROID STIM HORMONE: CPT | Performed by: NURSE PRACTITIONER

## 2023-04-13 PROCEDURE — 36415 COLL VENOUS BLD VENIPUNCTURE: CPT | Performed by: NURSE PRACTITIONER

## 2023-04-13 PROCEDURE — 83036 HEMOGLOBIN GLYCOSYLATED A1C: CPT | Performed by: NURSE PRACTITIONER

## 2023-04-13 PROCEDURE — 85025 COMPLETE CBC W/AUTO DIFF WBC: CPT | Performed by: NURSE PRACTITIONER

## 2023-04-13 PROCEDURE — 87624 HPV HI-RISK TYP POOLED RSLT: CPT | Performed by: NURSE PRACTITIONER

## 2023-04-13 PROCEDURE — 99213 OFFICE O/P EST LOW 20 MIN: CPT | Mod: 25 | Performed by: NURSE PRACTITIONER

## 2023-04-13 PROCEDURE — 99396 PREV VISIT EST AGE 40-64: CPT | Performed by: NURSE PRACTITIONER

## 2023-04-13 PROCEDURE — 80061 LIPID PANEL: CPT | Performed by: NURSE PRACTITIONER

## 2023-04-13 PROCEDURE — G0145 SCR C/V CYTO,THINLAYER,RESCR: HCPCS | Performed by: NURSE PRACTITIONER

## 2023-04-13 RX ORDER — NORETHINDRONE AND ETHINYL ESTRADIOL 1 MG-35MCG
KIT ORAL
Qty: 112 TABLET | Refills: 3 | Status: SHIPPED | OUTPATIENT
Start: 2023-04-13 | End: 2024-03-21

## 2023-04-13 ASSESSMENT — ENCOUNTER SYMPTOMS
JOINT SWELLING: 0
PARESTHESIAS: 0
WEAKNESS: 0
EYE PAIN: 0
CONSTIPATION: 0
SHORTNESS OF BREATH: 0
HEADACHES: 0
DIZZINESS: 0
COUGH: 0
BREAST MASS: 0
DIARRHEA: 0
NAUSEA: 0
ARTHRALGIAS: 0
MYALGIAS: 0
HEARTBURN: 0
HEMATURIA: 0
FREQUENCY: 0
NERVOUS/ANXIOUS: 0
SORE THROAT: 0
DYSURIA: 0
HEMATOCHEZIA: 0
FEVER: 0
CHILLS: 0
ABDOMINAL PAIN: 0
PALPITATIONS: 0

## 2023-04-13 ASSESSMENT — PATIENT HEALTH QUESTIONNAIRE - PHQ9
SUM OF ALL RESPONSES TO PHQ QUESTIONS 1-9: 0
SUM OF ALL RESPONSES TO PHQ QUESTIONS 1-9: 0
10. IF YOU CHECKED OFF ANY PROBLEMS, HOW DIFFICULT HAVE THESE PROBLEMS MADE IT FOR YOU TO DO YOUR WORK, TAKE CARE OF THINGS AT HOME, OR GET ALONG WITH OTHER PEOPLE: NOT DIFFICULT AT ALL

## 2023-04-13 ASSESSMENT — PAIN SCALES - GENERAL: PAINLEVEL: NO PAIN (0)

## 2023-04-13 NOTE — LETTER
My Asthma Action Plan    Name: Alyssa Manzano   YOB: 1980  Date: 4/13/2023   My doctor: EMMA Langley CNP   My clinic: [facility]        My Rescue Medicine:   Albuterol inhaler (Proair/Ventolin/Proventil HFA)  2-4 puffs EVERY 4 HOURS as needed. Use a spacer if recommended by your provider.   My Asthma Severity:   Intermittent / Exercise Induced  Know your asthma triggers: pollens  None          GREEN ZONE   Good Control  I feel good  No cough or wheeze  Can work, sleep and play without asthma symptoms       Take your asthma control medicine every day.     If exercise triggers your asthma, take your rescue medication  15 minutes before exercise or sports, and  During exercise if you have asthma symptoms  Spacer to use with inhaler: If you have a spacer, make sure to use it with your inhaler             YELLOW ZONE Getting Worse  I have ANY of these:  I do not feel good  Cough or wheeze  Chest feels tight  Wake up at night   Keep taking your Green Zone medications  Start taking your rescue medicine:  every 20 minutes for up to 1 hour. Then every 4 hours for 24-48 hours.  If you stay in the Yellow Zone for more than 12-24 hours, contact your doctor.  If you do not return to the Green Zone in 12-24 hours or you get worse, start taking your oral steroid medicine if prescribed by your provider.           RED ZONE Medical Alert - Get Help  I have ANY of these:  I feel awful  Medicine is not helping  Breathing getting harder  Trouble walking or talking  Nose opens wide to breathe       Take your rescue medicine NOW  If your provider has prescribed an oral steroid medicine, start taking it NOW  Call your doctor NOW  If you are still in the Red Zone after 20 minutes and you have not reached your doctor:  Take your rescue medicine again and  Call 911 or go to the emergency room right away    See your regular doctor within 2 weeks of an Emergency Room or Urgent Care visit for  follow-up treatment.          Annual Reminders:  Meet with Asthma Educator,  Flu Shot in the Fall, consider Pneumonia Vaccination for patients with asthma (aged 19 and older).    Pharmacy:    [facility] - [place], [place] - [address]  [facility] [id number] - [place], [place] - [address]  [facility] - [place], [place] - [address] [facility] - [place], [place] - [address]    Electronically signed by EMMA Langley CNP   Date: 04/13/23                    Asthma Triggers  How To Control Things That Make Your Asthma Worse    Triggers are things that make your asthma worse.  Look at the list below to help you find your triggers and   what you can do about them. You can help prevent asthma flare-ups by staying away from your triggers.      Trigger                                                          What you can do   Cigarette Smoke  Tobacco smoke can make asthma worse. Do not allow smoking in your home, car or around you.  Be sure no one smokes at a child s day care or school.  If you smoke, ask your health care provider for ways to help you quit.  Ask family members to quit too.  Ask your health care provider for a referral to Quit Plan to help you quit smoking, or call 0-356-738-PLAN.     Colds, Flu, Bronchitis  These are common triggers of asthma. Wash your hands often.  Don t touch your eyes, nose or mouth.  Get a flu shot every year.     Dust Mites  These are tiny bugs that live in cloth or carpet. They are too small to see. Wash sheets and blankets in hot water every week.   Encase pillows and mattress in dust mite proof covers.  Avoid having carpet if you can. If you have carpet, vacuum weekly.   Use a dust mask and HEPA vacuum.   Pollen and Outdoor Mold  Some people are allergic to trees, grass, or weed pollen, or molds. Try to keep your windows closed.  Limit time out doors when pollen count is high.   Ask you  health care provider about taking medicine during allergy season.     Animal Dander  Some people are allergic to skin flakes, urine or saliva from pets with fur or feathers. Keep pets with fur or feathers out of your home.    If you can t keep the pet outdoors, then keep the pet out of your bedroom.  Keep the bedroom door closed.  Keep pets off cloth furniture and away from stuffed toys.     Mice, Rats, and Cockroaches  Some people are allergic to the waste from these pests.   Cover food and garbage.  Clean up spills and food crumbs.  Store grease in the refrigerator.   Keep food out of the bedroom.   Indoor Mold  This can be a trigger if your home has high moisture. Fix leaking faucets, pipes, or other sources of water.   Clean moldy surfaces.  Dehumidify basement if it is damp and smelly.   Smoke, Strong Odors, and Sprays  These can reduce air quality. Stay away from strong odors and sprays, such as perfume, powder, hair spray, paints, smoke incense, paint, cleaning products, candles and new carpet.   Exercise or Sports  Some people with asthma have this trigger. Be active!  Ask your doctor about taking medicine before sports or exercise to prevent symptoms.    Warm up for 5-10 minutes before and after sports or exercise.     Other Triggers of Asthma  Cold air:  Cover your nose and mouth with a scarf.  Sometimes laughing or crying can be a trigger.  Some medicines and food can trigger asthma.

## 2023-04-13 NOTE — PROGRESS NOTES
SUBJECTIVE:   CC: Alyssa is an 42 year old who presents for preventive health visit.       4/13/2023     8:55 AM   Additional Questions   Roomed by Marilu MINA   Accompanied by self   Patient has been advised of split billing requirements and indicates understanding: Yes  Healthy Habits:     Getting at least 3 servings of Calcium per day:  Yes    Bi-annual eye exam:  NO    Dental care twice a year:  Yes    Sleep apnea or symptoms of sleep apnea:  None    Diet:  Regular (no restrictions)    Frequency of exercise:  None    Taking medications regularly:  Yes    Medication side effects:  Muscle aches and Other    PHQ-2 Total Score: 0    Additional concerns today:  No    Getting  this upcoming month. Life is going well.   Has heavy periods off OCP.   Autoimmune neutropenia, is followed by hematology.   Feels not well after her Farrxio injections for 1-2 days.               Today's PHQ-2 Score:       4/10/2023     2:43 PM   PHQ-2 ( 1999 [employer])   Q1: Little interest or pleasure in doing things 0   Q2: Feeling down, depressed or hopeless 0   PHQ-2 Score 0   Q1: Little interest or pleasure in doing things Not at all   Q2: Feeling down, depressed or hopeless Not at all   PHQ-2 Score 0           Social History     Tobacco Use     Smoking status: Former     Packs/day: 0.00     Years: 1.00     Pack years: 0.00     Types: Cigarettes     Smokeless tobacco: Never   Vaping Use     Vaping status: Never Used     Passive vaping exposure: Yes   Substance Use Topics     Alcohol use: Not Currently     Comment: 4 drinks per week             4/10/2023     2:43 PM   Alcohol Use   Prescreen: >3 drinks/day or >7 drinks/week? No     Reviewed orders with patient.  Reviewed health maintenance and updated orders accordingly - Yes  Lab work is in process    Breast Cancer Screening:    FHS-7:       12/13/2021     4:33 PM 12/13/2021     4:36 PM 11/8/2022     4:06 PM   Breast CA Risk Assessment (FHS-7)   Did any of your first-degree  relatives have breast or ovarian cancer? No No No   Did any of your relatives have bilateral breast cancer? Unknown No No   Did any man in your family have breast cancer? No No No   Did any woman in your family have breast and ovarian cancer? No No No   Did any woman in your family have breast cancer before age 50 y? Unknown Yes Yes   Do you have 2 or more relatives with breast and/or ovarian cancer? No Yes Yes   Do you have 2 or more relatives with breast and/or bowel cancer? No No No     Genetic counseling referral offered to patient : order -declined   Mammogram Screening - Offered annual screening and updated Health Maintenance for [place] plan based on risk factor consideration    Pertinent mammograms are reviewed under the imaging tab.    History of abnormal Pap smear: YES - other categories - see link Cervical Cytology Screening Guidelines  Negative [place] 2022.      Latest Ref Rng & Units 12/13/2021     4:26 PM 11/3/2016     5:05 PM 11/3/2016    12:00 AM   PAP / HPV   PAP  Negative for Intraepithelial Lesion or Malignancy (NILM)       PAP (Historical)    NIL     HPV 16 DNA Negative Negative   Negative      HPV 18 DNA Negative Positive   Negative      Other HR HPV Negative Negative   Negative        Reviewed and updated as needed this visit by clinical staff   Tobacco  Allergies  Meds              Reviewed and updated as needed this visit by Provider     Meds                 Review of Systems   Constitutional: Negative for chills and fever.   HENT: Negative for congestion, ear pain, hearing loss and sore throat.    Eyes: Negative for pain and visual disturbance.   Respiratory: Negative for cough and shortness of breath.    Cardiovascular: Negative for chest pain, palpitations and peripheral edema.   Gastrointestinal: Negative for abdominal pain, constipation, diarrhea, heartburn, hematochezia and nausea.   Breasts:  Negative for tenderness, breast mass and discharge.   Genitourinary: Negative for dysuria,  "frequency, genital sores, hematuria, pelvic pain, urgency, vaginal bleeding and vaginal discharge.   Musculoskeletal: Negative for arthralgias, joint swelling and myalgias.   Skin: Negative for rash.   Neurological: Negative for dizziness, weakness, headaches and paresthesias.   Psychiatric/Behavioral: Negative for mood changes. The patient is not nervous/anxious.           OBJECTIVE:   BP 93/58 (BP Location: Left arm, Patient Position: Sitting, Cuff Size: Adult Regular)   Pulse 66   Temp 97.7  F (36.5  C) (Temporal)   Resp 12   Ht 1.71 m (5' 7.32\")   Wt 73.1 kg (161 lb 3.2 oz)   LMP  (LMP Unknown)   SpO2 100%   BMI 25.01 kg/m    Physical Exam  GENERAL: healthy, alert and no distress  EYES: Eyes grossly normal to inspection, PERRL and conjunctivae and sclerae normal  HENT: ear canals and TM's normal, nose and mouth without ulcers or lesions  NECK: no adenopathy, no asymmetry, masses, or scars and thyroid normal to palpation  RESP: lungs clear to auscultation - no rales, rhonchi or wheezes  BREAST: normal without masses, tenderness or nipple discharge and no palpable axillary masses or adenopathy  CV: regular rate and rhythm, normal S1 S2, no S3 or S4, no murmur, click or rub, no peripheral edema and peripheral pulses strong  ABDOMEN: soft, nontender, no hepatosplenomegaly, no masses and bowel sounds normal   (female): normal female external genitalia, normal urethral meatus, vaginal mucosa pink, moist, well rugated, and normal cervix/adnexa/uterus without masses or discharge  MS: no gross musculoskeletal defects noted, no edema  SKIN: no suspicious lesions or rashes  NEURO: Normal strength and tone, mentation intact and speech normal  PSYCH: mentation appears normal, affect normal/bright    Diagnostic Test Results:  Labs reviewed in Epic  Results for orders placed or performed in visit on 04/13/23   Lipid panel reflex to direct LDL Fasting     Status: None   Result Value Ref Range    Cholesterol 155 <200 " mg/dL    Triglycerides 85 <150 mg/dL    Direct Measure HDL 72 >=50 mg/dL    LDL Cholesterol Calculated 66 <=100 mg/dL    Non HDL Cholesterol 83 <130 mg/dL    Patient Fasting > 8hrs? Yes     Narrative    Cholesterol  Desirable:  <200 mg/dL    Triglycerides  Normal:  Less than 150 mg/dL  Borderline High:  150-199 mg/dL  High:  200-499 mg/dL  Very High:  Greater than or equal to 500 mg/dL    Direct Measure HDL  Female:  Greater than or equal to 50 mg/dL   Male:  Greater than or equal to 40 mg/dL    LDL Cholesterol  Desirable:  <100mg/dL  Above Desirable:  100-129 mg/dL   Borderline High:  130-159 mg/dL   High:  160-189 mg/dL   Very High:  >= 190 mg/dL    Non HDL Cholesterol  Desirable:  130 mg/dL  Above Desirable:  130-159 mg/dL  Borderline High:  160-189 mg/dL  High:  190-219 mg/dL  Very High:  Greater than or equal to 220 mg/dL   Hemoglobin A1c     Status: Normal   Result Value Ref Range    Hemoglobin A1C 4.7 0.0 - 5.6 %   TSH with free T4 reflex     Status: Normal   Result Value Ref Range    TSH 1.91 0.40 - 4.00 mU/L   CBC with platelets and differential     Status: Abnormal   Result Value Ref Range    WBC Count 1.5 (LL) 4.0 - 11.0 10e3/uL    RBC Count 4.18 3.80 - 5.20 10e6/uL    Hemoglobin 13.5 11.7 - 15.7 g/dL    Hematocrit 40.0 35.0 - 47.0 %    MCV 96 78 - 100 fL    MCH 32.3 26.5 - 33.0 pg    MCHC 33.8 31.5 - 36.5 g/dL    RDW 12.1 10.0 - 15.0 %    Platelet Count 189 150 - 450 10e3/uL    % Neutrophils 26 %    % Lymphocytes 45 %    % Monocytes 22 %    % Eosinophils 6 %    % Basophils 1 %    % Immature Granulocytes 0 %    NRBCs per 100 WBC 0 <1 /100    Absolute Neutrophils 0.4 (LL) 1.6 - 8.3 10e3/uL    Absolute Lymphocytes 0.7 (L) 0.8 - 5.3 10e3/uL    Absolute Monocytes 0.3 0.0 - 1.3 10e3/uL    Absolute Eosinophils 0.1 0.0 - 0.7 10e3/uL    Absolute Basophils 0.0 0.0 - 0.2 10e3/uL    Absolute Immature Granulocytes 0.0 <=0.4 10e3/uL    Absolute NRBCs 0.0 10e3/uL   RBC and Platelet Morphology     Status: None   Result  Value Ref Range    Platelet Assessment  Automated Count Confirmed. Platelet morphology is normal.     Automated Count Confirmed. Platelet morphology is normal.    RBC Morphology Confirmed RBC Indices    CBC with Platelets & Differential     Status: Abnormal    Narrative    The following orders were created for panel order CBC with Platelets & Differential.  Procedure                               Abnormality         Status                     ---------                               -----------         ------                     CBC with platelets and d...[[id number]]  Abnormal            Final result               RBC and Platelet Morphology[[id number]]                      Final result                 Please view results for these tests on the individual orders.       ASSESSMENT/PLAN:       ICD-10-CM    1. Routine general medical examination at a health care facility  Z00.00 CBC with Platelets & Differential     Lipid panel reflex to direct LDL Fasting     Hemoglobin A1c     TSH with free T4 reflex     CBC with Platelets & Differential     Lipid panel reflex to direct LDL Fasting     Hemoglobin A1c     TSH with free T4 reflex      2. Menorrhagia with regular cycle  N92.0 norethindrone-ethinyl estradiol (NORTREL 1/35, 28,) 1-35 MG-MCG tablet      3. Autoimmune neutropenia (H)  D70.8 CBC with Platelets & Differential     CBC with Platelets & Differential      4. 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 Cervical cancer screening  Z12.4 Pap Screen with HPV - recommended age 30 - 65 years     HPV Hold (Lab Only)      6. Recurrent major depression in complete remission (H)  F33.42           Patient has been advised of split billing requirements and indicates understanding: No      COUNSELING:  Reviewed preventive health counseling, as reflected in patient instructions       Regular exercise       Healthy diet/nutrition       Immunizations    Not available today due to clinic refrigeration issues             Contraception        Colorectal Cancer Screening       - age 46  Mammogram- ordered    Menorrhagia- advise OCP, discussed IUD- pt. Declines.     Neutropenia noted today, labs forwarded to oncology- consulting for monitoring.       Depression remission- active monitoring.     Pap guidelines- per result for other HR HPV HX.        She reports that she has quit smoking. Her smoking use included cigarettes. She has never used smokeless tobacco.      [facility] RES[facility]MA  Answers for HPI/ROS submitted by the patient on 4/13/2023  If you checked off any problems, how difficult have these problems made it for you to do your work, take care of things at home, or get along with other people?: Not difficult at all  PHQ9 TOTAL SCORE: 0

## 2023-04-14 ENCOUNTER — TELEPHONE (OUTPATIENT)
Dept: FAMILY MEDICINE | Facility: CLINIC | Age: 43
End: 2023-04-14
Payer: COMMERCIAL

## 2023-04-14 LAB
BASOPHILS # BLD AUTO: 0 10E3/UL (ref 0–0.2)
BASOPHILS NFR BLD AUTO: 1 %
EOSINOPHIL # BLD AUTO: 0.1 10E3/UL (ref 0–0.7)
EOSINOPHIL NFR BLD AUTO: 6 %
ERYTHROCYTE [DISTWIDTH] IN BLOOD BY AUTOMATED COUNT: 12.1 % (ref 10–15)
HCT VFR BLD AUTO: 40 % (ref 35–47)
HGB BLD-MCNC: 13.5 G/DL (ref 11.7–15.7)
IMM GRANULOCYTES # BLD: 0 10E3/UL
IMM GRANULOCYTES NFR BLD: 0 %
LYMPHOCYTES # BLD AUTO: 0.7 10E3/UL (ref 0.8–5.3)
LYMPHOCYTES NFR BLD AUTO: 45 %
MCH RBC QN AUTO: 32.3 PG (ref 26.5–33)
MCHC RBC AUTO-ENTMCNC: 33.8 G/DL (ref 31.5–36.5)
MCV RBC AUTO: 96 FL (ref 78–100)
MONOCYTES # BLD AUTO: 0.3 10E3/UL (ref 0–1.3)
MONOCYTES NFR BLD AUTO: 22 %
NEUTROPHILS # BLD AUTO: 0.4 10E3/UL (ref 1.6–8.3)
NEUTROPHILS NFR BLD AUTO: 26 %
NRBC # BLD AUTO: 0 10E3/UL
NRBC BLD AUTO-RTO: 0 /100
PLAT MORPH BLD: NORMAL
PLATELET # BLD AUTO: 189 10E3/UL (ref 150–450)
RBC # BLD AUTO: 4.18 10E6/UL (ref 3.8–5.2)
RBC MORPH BLD: NORMAL
WBC # BLD AUTO: 1.5 10E3/UL (ref 4–11)

## 2023-04-14 NOTE — PROGRESS NOTES
SUBJECTIVE:   Alyssa Manzano is a 38 year old female who presents to clinic today for the following health issues:    History of Present Illness     Diet:  Regular (no restrictions)  Frequency of exercise:  2-3 days/week  Duration of exercise:  Less than 15 minutes  Taking medications regularly:  Yes  Medication side effects:  None  Additional concerns today:  No    Medication Followup of Birth Control    Taking Medication as prescribed: yes    Side Effects:  Yes, constant bleeding    Medication Helping Symptoms:  not applicable    She notes that she has not missed a day of her oral contraception until now as she is out of it. She reports that she had break through bleeding almost daily in the past 1.5 months. She did not miss any doses of her oral contraception before the breakthrough bleeding started. She has been taking her pills continuously for 3 months and then schedules in a period.      Anxiety Follow-Up    Status since last visit: No change    Other associated symptoms: None    Complicating factors:   Significant life event: Yes-  Uncle passed away   Current substance abuse: None  Depression symptoms: No  KAYY-7 SCORE 11/3/2016 1/29/2018 3/25/2019   Total Score - - -   Total Score - 0 (minimal anxiety) 3 (minimal anxiety)   Total Score 10 0 3     Answers for HPI/ROS submitted by the patient on 3/25/2019   Chronic problems general questions HPI Form  If you checked off any problems, how difficult have these problems made it for you to do your work, take care of things at home, or get along with other people?: Not difficult at all  PHQ9 TOTAL SCORE: 0  KAYY 7 TOTAL SCORE: 3  KAYY-7    She notes that since January she is officially . She notes that it went well. She is dating a new person currently. She reports that she just recently had an STD screening done and so declined a repeat today.     Asthma Follow-Up  Was ACT completed today?  Yes    ACT Total Scores 3/25/2019   ACT TOTAL SCORE (Goal  Greater than or Equal to 20) 25   In the past 12 months, how many times did you visit the emergency room for your asthma without being admitted to the hospital? 0   In the past 12 months, how many times were you hospitalized overnight because of your asthma? 0     Recent asthma triggers that patient is dealing with: None     She has not needed to user her inhaler for the last year.     Problem list and histories reviewed & adjusted, as indicated.  Additional history: as documented    She reports that her grandmother did have breast cancer, but has not had genetic testing.    Patient Active Problem List   Diagnosis     Chronic rhinitis     Abnormal Pap smear, low grade squamous intraepithelial lesion (LGSIL)     CARDIOVASCULAR SCREENING; LDL GOAL LESS THAN 160     Urticaria     Autoimmune neutropenia (H)     Generalized anxiety disorder     Excessive or frequent menstruation     Intermittent asthma, uncomplicated     Umbilical hernia without obstruction and without gangrene     Bilateral inguinal hernia without obstruction or gangrene, recurrence not specified     Diastasis recti     Past Surgical History:   Procedure Laterality Date     ABDOMEN SURGERY      hernia repair open     BIOPSY      bone marrow     C/SECTION, LOW TRANSVERSE  ,      LAPAROSCOPIC HERNIORRHAPHY UMBILICAL N/A 3/31/2015    Procedure: LAPAROSCOPIC HERNIORRHAPHY UMBILICAL;  Surgeon: Bigg Mirza MD;  Location: [facility] OR     vaginal [facility]         Social History     Tobacco Use     Smoking status: Former Smoker     Years: 1.00     Types: Cigarettes     Smokeless tobacco: Never Used   Substance Use Topics     Alcohol use: Yes     Comment: 4 drinks per week     Family History   Problem Relation Age of Onset     Cancer Maternal Grandfather         lung, smoker     Heart Disease Maternal Grandfather         CAD age 70's  age 81     C.A.D. Maternal Grandfather         81     Cerebrovascular Disease Maternal Grandfather       Diabetes Maternal Grandfather      Other Cancer Maternal Grandfather      Cerebrovascular Disease Paternal Grandfather         Multiple CVA     Breast Cancer Paternal Grandmother      Cancer Paternal Grandmother      Diabetes Father      Asthma Father      Coronary Artery Disease Other      Liver Cancer Other      Depression Sister      Anxiety Disorder Sister      Coronary Artery Disease Paternal Uncle      Heart Failure Paternal Uncle      Coronary Artery Disease Paternal Uncle      Heart Failure Paternal Uncle      Hypertension No family hx of      Thyroid Disease No family hx of      Alcohol/Drug No family hx of      Cancer - colorectal No family hx of      Prostate Cancer No family hx of      Allergies No family hx of      Arthritis No family hx of      Alzheimer Disease No family hx of      Anesthesia Reaction No family hx of      Ovarian Cancer No family hx of      Mental Illness No family hx of      Osteoporosis No family hx of      Known Genetic Syndrome No family hx of      Obesity No family hx of      Unknown/Adopted No family hx of      Mental Illness No family hx of      Substance Abuse No family hx of      Genetic Disorder No family hx of      Hyperlipidemia No family hx of      Colon Cancer No family hx of          Current Outpatient Medications   Medication Sig Dispense Refill     filgrastim-sndz (ZARXIO) 480 MCG/0.8ML syringe Inject two to three times a week as instructed 12 Syringe 11     Multiple Vitamins-Minerals (MULTIVITAMIN ADULT PO)        norethindrone-ethinyl estradiol (ORTHO-NOVUM 1-35 TAB,NORTREL 1-35 TAB) 1-35 MG-MCG tablet Take 1 tablet by mouth daily 112 tablet 2     Probiotic Product (PROBIOTIC ADVANCED PO)        albuterol (PROAIR HFA/PROVENTIL HFA/VENTOLIN HFA) 108 (90 Base) MCG/ACT inhaler Inhale 2 puffs into the lungs every 4 hours as needed for shortness of breath / dyspnea or wheezing (Patient not taking: Reported on 3/25/2019) 1 Inhaler 1     UNABLE TO FIND MEDICATION NAME:  "hair skin nails       Allergies   Allergen Reactions     No Known Drug Allergies      Recent Labs   Lab Test 02/14/17  1442 03/10/15  01/09/14  1346  05/24/11  1037   LDL  --   --   --  72  --   --    HDL  --   --   --  57  --   --    TRIG  --   --   --  115  --   --    ALT 25  --   --   --   --  15   CR 0.63 0.83   < >  --    < > 0.58   GFRESTIMATED >90  Non  GFR Calc   >60   < >  --    < > >90   GFRESTBLACK >90   GFR Calc   >60   < >  --    < > >90   POTASSIUM 3.4 4.0   < >  --   --  4.0   TSH  --   --   --  1.84  --  1.42    < > = values in this interval not displayed.      BP Readings from Last 3 Encounters:   03/25/19 100/58   11/09/18 100/72   06/19/18 116/78    Wt Readings from Last 3 Encounters:   03/25/19 63.9 kg (140 lb 12.8 oz)   11/09/18 64.8 kg (142 lb 12.8 oz)   06/19/18 62.3 kg (137 lb 6.4 oz)        ROS:  Constitutional, HEENT, cardiovascular, pulmonary, GI, , musculoskeletal, neuro, skin, endocrine and psych systems are negative, except as otherwise noted.    This document serves as a record of the services and decisions personally performed and made by Tawana Handley CNP. It was created on his/her behalf by Bel Pink, trained medical scribe. The creation of this document is based the provider's statements to the medical scribes.    Scribtricia Pink 11:27 AM, March 25, 2019  OBJECTIVE:     /58   Pulse 94   Temp 98.8  F (37.1  C) (Temporal)   Resp 16   Ht 1.715 m (5' 7.5\")   Wt 63.9 kg (140 lb 12.8 oz)   SpO2 100%   BMI 21.73 kg/m    Body mass index is 21.73 kg/m .     GENERAL: healthy, alert and no distress  HENT: ear canals and TM's normal, nose and mouth without ulcers or lesions  NECK: no adenopathy, no asymmetry, masses, or scars and thyroid normal to palpation  RESP: lungs clear to auscultation - no rales, rhonchi or wheezes  CV: regular rate and rhythm, normal S1 S2, no S3 or S4, no murmur, click or rub, no peripheral edema and peripheral " pulses strong  NEURO: Normal strength and tone, mentation intact and speech normal  PSYCH: mentation appears normal, affect normal/bright    Diagnostic Test Results:  No results found for this or any previous visit (from the past 24 hour(s)).    ASSESSMENT/PLAN:       ICD-10-CM    1. Encounter for surveillance of contraceptive pills Z30.41 norethindrone-ethinyl estradiol (ORTHO-NOVUM 1-35 TAB,NORTREL 1-35 TAB) 1-35 MG-MCG tablet     Discussed mood, contraception, healthy diet, and regular exercise at length with patient today.      Patient reports that she had prolonged breakthrough bleeding on her oral contraceptive. Plan to stop her mono-linyah 0.25-35 mg-mcg. Advised starting norethindrone-ethinyl estradiol 1-35 mg-mcg tablet; Rx provided. Reviewed directions, benefits, and side effects of medication with patient today. Discussed that if she has breakthrough bleeding on this new oral contraceptive, that she can schedule in a period every 2 months instead of every 3 months of continuous dosing.     Follow up with PCP in 6 months for annual physical exam with routine fasting lab work or prn.     The information in this document, created by the medical scribe for me, accurately reflects the services I personally performed and the decisions made by me. I have reviewed and approved this document for accuracy prior to leaving the patient care area.  Tawana Handley CNP  11:27 AM, 03/25/19    [facility]       no

## 2023-04-14 NOTE — TELEPHONE ENCOUNTER
Alyssa calling back but there was no encounter to follow.  She is asking about labs and saying that she might have to redue the labs.    Spoke with our lab personal in [place] and lab specimen was found, its been processed and resulted and she doesn't have to come in to be drawn again.    Did relay message to Alyssa.    Closing encounter.    Sana Gamboa RN  [facility] ~ Registered Nurse  Clinic Triage ~ [place] [facility] [place]  April 14, 2023

## 2023-04-17 LAB
BKR LAB AP GYN ADEQUACY: NORMAL
BKR LAB AP GYN INTERPRETATION: NORMAL
BKR LAB AP HPV REFLEX: NORMAL
BKR LAB AP LMP: NORMAL
BKR LAB AP PREVIOUS ABNL DX: NORMAL
BKR LAB AP PREVIOUS ABNORMAL: NORMAL
PATH REPORT.COMMENTS IMP SPEC: NORMAL
PATH REPORT.COMMENTS IMP SPEC: NORMAL
PATH REPORT.RELEVANT HX SPEC: NORMAL

## 2023-04-20 ENCOUNTER — PATIENT OUTREACH (OUTPATIENT)
Dept: FAMILY MEDICINE | Facility: CLINIC | Age: 43
End: 2023-04-20
Payer: COMMERCIAL

## 2023-04-20 DIAGNOSIS — R87.810 CERVICAL HIGH RISK HPV (HUMAN PAPILLOMAVIRUS) TEST POSITIVE: ICD-10-CM

## 2023-06-30 DIAGNOSIS — D70.8 AUTOIMMUNE NEUTROPENIA (H): ICD-10-CM

## 2023-06-30 NOTE — TELEPHONE ENCOUNTER
SUBJECTIVE/OBJECTIVE:                                                    Refill request receive for:  ZARXIO 300 MCG/0.5ML syringe    Last refill: 12/29/2022  Date of LOV r/t Medication: 6/20/2022, cancelled appt for annual follow up on 6/19/2023  Future appt scheduled? No     RECENT LABS/VITALS                                                      Recent Labs   Lab Test 04/13/23  0947 12/13/21  1706   CHOL 155 144   HDL 72 48*   LDL 66 58   TRIG 85 189*     Lab Results   Component Value Date    AST 11 11/06/2019     Lab Results   Component Value Date    ALT 18 11/06/2019     Lab Results   Component Value Date    A1C 4.7 04/13/2023    A1C 4.3 11/06/2019     Creatinine   Date Value Ref Range Status   11/06/2019 0.62 0.52 - 1.04 mg/dL Final   ]  Potassium   Date Value Ref Range Status   11/06/2019 3.9 3.4 - 5.3 mmol/L Final   ]  TSH   Date Value Ref Range Status   04/13/2023 1.91 0.40 - 4.00 mU/L Final   12/13/2021 2.96 0.40 - 4.00 mU/L Final   11/06/2019 1.70 0.40 - 4.00 mU/L Final   01/09/2014 1.84 0.4 - 5.0 mU/L Final   05/24/2011 1.42 0.4 - 5.0 mU/L Final   09/29/2009 1.65 0.4 - 5.0 mU/L Final     BP Readings from Last 4 Encounters:   04/13/23 93/58   06/20/22 104/65   05/19/22 124/84   12/13/21 102/64     Component      Latest Ref Rng 4/13/2023  9:47 AM   WBC      4.0 - 11.0 10e3/uL 1.5 (LL)    RBC Count      3.80 - 5.20 10e6/uL 4.18    Hemoglobin      11.7 - 15.7 g/dL 13.5    Hematocrit      35.0 - 47.0 % 40.0    MCV      78 - 100 fL 96    MCH      26.5 - 33.0 pg 32.3    MCHC      31.5 - 36.5 g/dL 33.8    RDW      10.0 - 15.0 % 12.1    Platelet Count      150 - 450 10e3/uL 189    % Neutrophils      % 26    % Lymphocytes      % 45    % Monocytes      % 22    % Eosinophils      % 6    % Basophils      % 1    % Immature Granulocytes      % 0    NRBCs per 100 WBC      <1 /100 0    Absolute Neutrophils      1.6 - 8.3 10e3/uL 0.4 (LL)    Absolute Lymphocytes      0.8 - 5.3 10e3/uL 0.7 (L)    Absolute Monocytes      0.0  - 1.3 10e3/uL 0.3    Absolute Eosinophils      0.0 - 0.7 10e3/uL 0.1    Absolute Basophils      0.0 - 0.2 10e3/uL 0.0    Absolute Immature Granulocytes      <=0.4 10e3/uL 0.0    Absolute NRBCs      10e3/uL 0.0    Platelet Morphology      Automated Count Confirmed. Platelet morphology is normal.  Automated Count Confirmed. Platelet morphology is normal.    RBC Morphology Confirmed RBC Indices         PLAN:                                                      Sent to provider to advise.    Flower Mcbride RN

## 2023-07-02 RX ORDER — FILGRASTIM-SNDZ 300 UG/.5ML
INJECTION, SOLUTION INTRAVENOUS; SUBCUTANEOUS
Qty: 2 ML | Refills: 0 | Status: SHIPPED | OUTPATIENT
Start: 2023-07-02 | End: 2023-08-03

## 2023-07-06 ENCOUNTER — VIRTUAL VISIT (OUTPATIENT)
Dept: FAMILY MEDICINE | Facility: CLINIC | Age: 43
End: 2023-07-06
Payer: COMMERCIAL

## 2023-07-06 DIAGNOSIS — D70.8 AUTOIMMUNE NEUTROPENIA (H): Primary | ICD-10-CM

## 2023-07-06 DIAGNOSIS — B97.7 HPV IN FEMALE: ICD-10-CM

## 2023-07-06 PROCEDURE — 99207 PR NO CHARGE LOS: CPT | Performed by: NURSE PRACTITIONER

## 2023-07-06 NOTE — PROGRESS NOTES
"Alyssa is a 42 year old who is being evaluated via a billable video visit.      How would you like to obtain your AVS? MyChart  If the video visit is dropped, the invitation should be resent by: Text to cell phone: [phone]  Will anyone else be joining your video visit? No    Patient completed E-check in. Questionnaires were blown in and Patient checked in without being called.     Assessment & Plan     Autoimmune neutropenia (H)  Referral placed.   - Adult Oncology/Hematology  Referral; Future    HPV in female  Referral placed.   - Ob/Gyn Referral; Future    Seen previously- no charge.            BMI:   Estimated body mass index is 25.01 kg/m  as calculated from the following:    Height as of 4/13/23: 1.71 m (5' 7.32\").    Weight as of 4/13/23: 73.1 kg (161 lb 3.2 oz).           EMMA Langley [employer] RESHMA Shah is a 42 year old, presenting for the following health issues:  Referral (Colonoscopy and Oncology)        7/6/2023     9:18 AM   Additional Questions   Roomed by Memo LEWIS   Accompanied by Self         7/6/2023     9:18 AM   Patient Reported Additional Medications   Patient reports taking the following new medications None     History of Present Illness       Reason for visit:  Need referal for Cancer DR and for my Colposcopy    She eats 4 or more servings of fruits and vegetables daily.She consumes 0 sweetened beverage(s) daily.She exercises with enough effort to increase her heart rate 20 to 29 minutes per day.  She exercises with enough effort to increase her heart rate 3 or less days per week.   She is taking medications regularly.               Review of Systems         Objective           Vitals:  No vitals were obtained today due to virtual visit.    Physical Exam   GENERAL: Healthy, alert and no distress  EYES: Eyes grossly normal to inspection.  No discharge or erythema, or obvious scleral/conjunctival abnormalities.  RESP: No audible " wheeze, cough, or visible cyanosis.  No visible retractions or increased work of breathing.    SKIN: Visible skin clear. No significant rash, abnormal pigmentation or lesions.  NEURO: Cranial nerves grossly intact.  Mentation and speech appropriate for age.  PSYCH: Mentation appears normal, affect normal/bright, judgement and insight intact, normal speech and appearance well-groomed.                Video-Visit Details    Type of service:  Video Visit     Originating Location (pt. Location): Home  Distant Location (provider location):  Off-site  Platform used for Video Visit: Oncothyreon

## 2023-07-25 ENCOUNTER — ONCOLOGY VISIT (OUTPATIENT)
Dept: ONCOLOGY | Facility: CLINIC | Age: 43
End: 2023-07-25
Payer: COMMERCIAL

## 2023-07-25 ENCOUNTER — PATIENT OUTREACH (OUTPATIENT)
Dept: ONCOLOGY | Facility: CLINIC | Age: 43
End: 2023-07-25

## 2023-07-25 ENCOUNTER — LAB (OUTPATIENT)
Dept: LAB | Facility: CLINIC | Age: 43
End: 2023-07-25
Payer: COMMERCIAL

## 2023-07-25 VITALS
DIASTOLIC BLOOD PRESSURE: 64 MMHG | OXYGEN SATURATION: 99 % | SYSTOLIC BLOOD PRESSURE: 98 MMHG | HEIGHT: 67 IN | WEIGHT: 160 LBS | HEART RATE: 51 BPM | BODY MASS INDEX: 25.11 KG/M2

## 2023-07-25 DIAGNOSIS — D70.8 AUTOIMMUNE NEUTROPENIA (H): ICD-10-CM

## 2023-07-25 DIAGNOSIS — M89.8X9 BONE PAIN DUE TO G-CSF: ICD-10-CM

## 2023-07-25 DIAGNOSIS — D70.8 AUTOIMMUNE NEUTROPENIA (H): Primary | ICD-10-CM

## 2023-07-25 LAB
BASOPHILS # BLD AUTO: 0 10E3/UL (ref 0–0.2)
BASOPHILS NFR BLD AUTO: 1 %
EOSINOPHIL # BLD AUTO: 0 10E3/UL (ref 0–0.7)
EOSINOPHIL NFR BLD AUTO: 3 %
ERYTHROCYTE [DISTWIDTH] IN BLOOD BY AUTOMATED COUNT: 12 % (ref 10–15)
HCT VFR BLD AUTO: 38.7 % (ref 35–47)
HGB BLD-MCNC: 13 G/DL (ref 11.7–15.7)
HOLD SPECIMEN: NORMAL
HOLD SPECIMEN: NORMAL
IMM GRANULOCYTES # BLD: 0 10E3/UL
IMM GRANULOCYTES NFR BLD: 0 %
LYMPHOCYTES # BLD AUTO: 0.6 10E3/UL (ref 0.8–5.3)
LYMPHOCYTES NFR BLD AUTO: 38 %
MCH RBC QN AUTO: 32.1 PG (ref 26.5–33)
MCHC RBC AUTO-ENTMCNC: 33.6 G/DL (ref 31.5–36.5)
MCV RBC AUTO: 96 FL (ref 78–100)
MONOCYTES # BLD AUTO: 0.4 10E3/UL (ref 0–1.3)
MONOCYTES NFR BLD AUTO: 23 %
NEUTROPHILS # BLD AUTO: 0.6 10E3/UL (ref 1.6–8.3)
NEUTROPHILS NFR BLD AUTO: 35 %
NRBC # BLD AUTO: 0 10E3/UL
NRBC BLD AUTO-RTO: 0 /100
PLAT MORPH BLD: NORMAL
PLATELET # BLD AUTO: 177 10E3/UL (ref 150–450)
RBC # BLD AUTO: 4.05 10E6/UL (ref 3.8–5.2)
RBC MORPH BLD: NORMAL
WBC # BLD AUTO: 1.6 10E3/UL (ref 4–11)

## 2023-07-25 PROCEDURE — 36415 COLL VENOUS BLD VENIPUNCTURE: CPT

## 2023-07-25 PROCEDURE — 99214 OFFICE O/P EST MOD 30 MIN: CPT | Performed by: INTERNAL MEDICINE

## 2023-07-25 PROCEDURE — 85025 COMPLETE CBC W/AUTO DIFF WBC: CPT

## 2023-07-25 RX ORDER — FILGRASTIM-SNDZ 300 UG/.5ML
INJECTION, SOLUTION INTRAVENOUS; SUBCUTANEOUS
Qty: 1 ML | Refills: 3 | Status: SHIPPED | OUTPATIENT
Start: 2023-07-25 | End: 2024-03-21

## 2023-07-25 ASSESSMENT — PAIN SCALES - GENERAL: PAINLEVEL: NO PAIN (0)

## 2023-07-25 NOTE — NURSING NOTE
"Oncology Rooming Note    July 25, 2023 2:00 PM   Alyssa Manzano is a 42 year old female who presents for:    Chief Complaint   Patient presents with    Oncology Clinic Visit     Follow up     Initial Vitals: BP 98/64 (BP Location: Right arm, Patient Position: Chair, Cuff Size: Adult Regular)   Pulse 51   Ht 1.71 m (5' 7.32\")   Wt 72.6 kg (160 lb)   LMP  (LMP Unknown)   SpO2 99%   BMI 24.82 kg/m   Estimated body mass index is 24.82 kg/m  as calculated from the following:    Height as of this encounter: 1.71 m (5' 7.32\").    Weight as of this encounter: 72.6 kg (160 lb). Body surface area is 1.86 meters squared.  No Pain (0) Comment: Data Unavailable   No LMP recorded (lmp unknown).  Allergies reviewed: Yes  Medications reviewed: Yes    Medications: Medication refills not needed today.  Pharmacy name entered into EPIC:    [facility] - [place], [place] - [address] [facility] [id number] - [place], [place] - [address]  [facility] - [place], [place] - [address] [facility] - [place], [place] - [address]    Clinical concerns: Yes  Dr. Parker was notified.      Juanis Kelsey CMA              "

## 2023-07-25 NOTE — LETTER
2023         RE: Alyssa Manzano  [address] [facility] [address]        Dear Colleague,    Thank you for referring your patient, Alyssa Manzano, to the [facility] [place]. Please see a copy of my visit note below.    [facility] Hematology / Oncology  Progress Note 2023  Name: Alyssa Manzano  :  1980    MRN:  [id number]    --------------------    Assessment / Plan:  Autoimmune neutropenia, G-CSF responsive (complicated by bone pain).    Alyssa remains infection free clinically despite modest to severe neutropenia.  Reviewed that increased G-CSF to drive her ANC higher would likely aggravate her pre-existing bone pain, but unlikely to make her feel better from an infection standpoint.  We agreed to continue 300 mcg weekly with an additional couple days of as needed 300 mg daily for infections which should alleviate the rare infection she does get.  Planning on seasonal flu and COVID vaccinations; completed pneumonia series; question whether shingles series and splenectomy vaccinations would be indicated.  Still has her spleen; has not received rituximab.  She will knows to contact us for worsening infections or blood counts.  No major financial toxicity.  Continue age-appropriate cancer surveillance.    Follow-up / Orders:  BJT MG 12 months with CBC with every 3 month CBC through follow-up.    Jake Parker MD    --------------------    Interval History:  Alyssa returns for follow up of autoimmune neutropenia.  All in all she remains quite well.  She continues to deal with a lot of bone pain.  Claritin is minimally helpful.  Medical marijuana seems to be the most effective.  She feels incredibly well 95% of the year but does deal with infections maybe once or twice per year.  Takes her a little bit longer to get over these infections.    Social History:  Social History     Tobacco Use     Smoking status: Former     Packs/day: 0.00  "    Years: 1.00     Pack years: 0.00     Types: Cigarettes     Smokeless tobacco: Never   Vaping Use     Vaping Use: Never used   Substance Use Topics     Alcohol use: Not Currently     Comment: 4 drinks per week     Drug use: No     --------------------    Physical Exam:  VS: BP 98/64 (BP Location: Right arm, Patient Position: Chair, Cuff Size: Adult Regular)   Pulse 51   Ht 1.71 m (5' 7.32\")   Wt 72.6 kg (160 lb)   LMP  (LMP Unknown)   SpO2 99%   BMI 24.82 kg/m  .  GEN: Well appearing.    Labs / Imaging:  Reviewed CBC.      Again, thank you for allowing me to participate in the care of your patient.        Sincerely,        Jake Parker MD  "

## 2023-07-25 NOTE — PROGRESS NOTES
Matilde calling with critical lab value for patient, WBC 1.6.  Result read back and provider notified.  Patient has visit with provider.     Flower Mcbride RN

## 2023-07-28 NOTE — PATIENT INSTRUCTIONS
If you have labs or imaging done, the results will automatically release in [employer] without an interpretation.  Your health care professional will review those results and send an interpretation with recommendations as soon as possible, but this may be 1-3 business days.    If you have any questions regarding your visit, please contact your care team.     [employer] Access Services: [phone]  [facility] CLINIC HOURS TELEPHONE NUMBER       MD Rama Porras - Certified Medical Assistant     Kristy- LEAD RN  Genia-ADRIEN Buck-ADRIEN Boggs-  [place]-     Monday- [place]  8:00 a.m - 5:00 p.m    Tuesday- Surgery        Thursday- [place]  8:00 a.m - 5:00 p.m.    Friday- [place]  7:30 a.m - 4:00 p.m. [facility]  [address]  [place] [address]  [phone] Fax  Imaging Scheduling [phone]    [facility]  [address] [place] [address] Dr.  [place], [place] [address] [phone]    [facility]  [address], [place] [address] [phone] Fax  Imaging Scheduling [phone]     Urgent Care locations:  [facility] Monday-Friday  10 am - 8 pm  Saturday and Sunday   9 am - 5 pm  Monday-Friday   10 am - 8 pm  Saturday and Sunday   9 am - 5 pm   [phone]     **Surgeries** Our Surgery Schedulers will contact you to schedule. If you do not receive a call within 3 business days, please call [phone].    If you need a medication refill, please contact your pharmacy. Please allow 3 business days for your refill to be completed.    As always, thank you for trusting us with your healthcare needs!

## 2023-07-31 ENCOUNTER — OFFICE VISIT (OUTPATIENT)
Dept: OBGYN | Facility: CLINIC | Age: 43
End: 2023-07-31
Payer: COMMERCIAL

## 2023-07-31 VITALS
SYSTOLIC BLOOD PRESSURE: 99 MMHG | BODY MASS INDEX: 24.82 KG/M2 | HEART RATE: 70 BPM | DIASTOLIC BLOOD PRESSURE: 72 MMHG | WEIGHT: 160 LBS

## 2023-07-31 DIAGNOSIS — R87.810 CERVICAL HIGH RISK HPV (HUMAN PAPILLOMAVIRUS) TEST POSITIVE: Primary | ICD-10-CM

## 2023-07-31 DIAGNOSIS — B97.7 HPV IN FEMALE: ICD-10-CM

## 2023-07-31 PROCEDURE — 88305 TISSUE EXAM BY PATHOLOGIST: CPT | Performed by: PATHOLOGY

## 2023-07-31 PROCEDURE — 57456 ENDOCERV CURETTAGE W/SCOPE: CPT | Performed by: OBSTETRICS & GYNECOLOGY

## 2023-07-31 NOTE — PROGRESS NOTES
42 year old  presents for colposcopy.      Indication for procedure: 23 NIL pap, + HR HPV 18.  Prior history of cervical dysplasia:    3/2/07 LSIL  08 pap LSIL/ cannot exclude HSIL.  08 colpo- NINA 1. Plan: cotest in 1 year.  3/18/09 pap NIL  09 pap NIL.  Plan- f/u 1 year  11 ASCUS pap, negative HPV (per CE)  3/1/13 NIL pap (per CE)  14 NIL pap/neg HR HPV. Plan: pap in 3 years.  11/3/16 NIL pap/neg HR HPV. Plan:   21 NIL pap, + HR HPV 18. Plan: colposcopy due by 3/12/22  5/19/22 [place] bx: no NINA. Plan: Cotest in 1 yr.  23 NIL pap, + HR HPV 18. Plan: colp by 23    Prior Colposcopy history: Yes   No LMP recorded (lmp unknown).    Tobacco: Yes   Gardasil vaccination status: No  She has had a tubal.     Discussed nature of HPV related infection, natural history and association with cervical dysplasia.  Procedure for colposcopy and biopsy was explained to the patient and consent obtained.  All the patient's questions were answered.    PROCEDURE:  COLPOSCOPY  After a procedural timeout was taken, she was positioned in dorsal lithotomy and a speculum was inserted to allow visualization of the cervix. A 5% acetic acid solution was applied to the ectocervix with large swabs. Lugols solution was also applied.  Colposcopic examination was then undertaken as noted below.    FINDINGS:  Physical Exam    Procedures    Cervix: no visible lesions, no mosaicism, no punctation and no abnormal vasculature;  SCJ completely visualized.  Area at 1 oclock that was biopsied last year only shows slight acetowhite changes and normal lugols uptake. ECC done.      Vaginal inspection: normal without visible lesions.    Vulvar colposcopy: vulvar colposcopy not performed.    Procedure Summary: Patient tolerated procedure well and colposcopy adequate.    Colposcopic Impression: Normal.       Plan: call to discuss Pathology results.  Codey velasquez.  Repeat Pap and HPV in 12 months if mild changes or  better.      Alyssa Villalobos MD

## 2023-08-02 DIAGNOSIS — D70.8 AUTOIMMUNE NEUTROPENIA (H): ICD-10-CM

## 2023-08-03 RX ORDER — FILGRASTIM-SNDZ 300 UG/.5ML
INJECTION, SOLUTION INTRAVENOUS; SUBCUTANEOUS
Qty: 2 ML | Refills: 0 | OUTPATIENT
Start: 2023-08-03

## 2023-08-03 NOTE — TELEPHONE ENCOUNTER
SUBJECTIVE/OBJECTIVE:                                                    Refill request receive for:  ZARXIO 300 MCG/0.5ML syringe     Last refill: 7/2/2023, one month supply only approved  Date of LOV r/t Medication: 7/25/2023  Future appt scheduled? Yes: 7/17/2024     RECENT LABS/VITALS                                                      Recent Labs   Lab Test 04/13/23  0947 12/13/21  1706   CHOL 155 144   HDL 72 48*   LDL 66 58   TRIG 85 189*     Lab Results   Component Value Date    AST 11 11/06/2019     Lab Results   Component Value Date    ALT 18 11/06/2019     Lab Results   Component Value Date    A1C 4.7 04/13/2023    A1C 4.3 11/06/2019     Creatinine   Date Value Ref Range Status   11/06/2019 0.62 0.52 - 1.04 mg/dL Final   ]  Potassium   Date Value Ref Range Status   11/06/2019 3.9 3.4 - 5.3 mmol/L Final   ]  TSH   Date Value Ref Range Status   04/13/2023 1.91 0.40 - 4.00 mU/L Final   12/13/2021 2.96 0.40 - 4.00 mU/L Final   11/06/2019 1.70 0.40 - 4.00 mU/L Final   01/09/2014 1.84 0.4 - 5.0 mU/L Final   05/24/2011 1.42 0.4 - 5.0 mU/L Final   09/29/2009 1.65 0.4 - 5.0 mU/L Final     BP Readings from Last 4 Encounters:   07/31/23 99/72   07/25/23 98/64   04/13/23 93/58   06/20/22 104/65       PLAN:                                                      Sent to provider to advise.      Flower Mcbride RN

## 2023-08-04 RX ORDER — FILGRASTIM-SNDZ 300 UG/.5ML
INJECTION, SOLUTION INTRAVENOUS; SUBCUTANEOUS
Qty: 2 ML | Refills: 11 | Status: SHIPPED | OUTPATIENT
Start: 2023-08-04 | End: 2024-08-09

## 2023-08-18 ENCOUNTER — PATIENT OUTREACH (OUTPATIENT)
Dept: OBGYN | Facility: CLINIC | Age: 43
End: 2023-08-18
Payer: COMMERCIAL

## 2023-09-13 ENCOUNTER — NURSE TRIAGE (OUTPATIENT)
Dept: FAMILY MEDICINE | Facility: CLINIC | Age: 43
End: 2023-09-13
Payer: COMMERCIAL

## 2023-09-13 ENCOUNTER — MYC MEDICAL ADVICE (OUTPATIENT)
Dept: FAMILY MEDICINE | Facility: CLINIC | Age: 43
End: 2023-09-13
Payer: COMMERCIAL

## 2023-09-13 NOTE — TELEPHONE ENCOUNTER
Right shoulder pain X2 months.  Dull achy and constant.  Has had PT in the past which she states thinks had helped.  Has never had an MRI done for this pain in past.    She did see a chiropractor lately and she thinks this made it worse.      Still able to use it.  Denies numbness and tingling.  But does state she does periodically have some numbness to arm but not today.    SEE IN OFFICE WITHIN 2 WEEKS:   * You need to be examined.   * Let me give you an appointment.    Scheduled appt with PCP for Monday 9/18/23    Sana Gamboa RN  [employer] ~ Registered Nurse  Clinic Triage ~ [facility]  September 13, 2023      Reason for Disposition   Shoulder pain is a chronic symptom (recurrent or ongoing AND present > 4 weeks)    Additional Information   Negative: Painful rash with multiple small blisters grouped together (i.e., dermatomal distribution or 'band' or 'stripe')   Negative: Looks like a boil, infected sore, deep ulcer or other infected rash (spreading redness, pus)   Negative: Localized rash is very painful (no fever)   Negative: Weakness (i.e., loss of strength) in hand or fingers  (Exception: Not truly weak; hand feels weak because of pain.)   Negative: Numbness (i.e., loss of sensation) in hand or fingers   Negative: Unable to use arm at all and because of shoulder pain or stiffness   Negative: MODERATE pain (e.g., interferes with normal activities) and present > 3 days   Negative: Pain is worsened or caused by bending the neck   Negative: SEVERE pain (e.g., excruciating, unable to do any normal activities)   Negative: Shoulder pains with exertion (e.g., walking) and pain goes away on resting and not present now   Negative: Difficulty breathing or unusual sweating (e.g., sweating without exertion)   Negative: Pain lasting > 5 minutes and pain also present in chest  (Exception: Pain is clearly made worse by movement.)   Negative: Age > 40 and no obvious cause and pain even when not moving the arm   (Exception: Pain is clearly made worse by moving arm or bending neck.)   Negative: Red area or streak and fever   Negative: Swollen joint and fever   Negative: Entire arm is swollen   Negative: Patient sounds very sick or weak to the triager   Negative: Chest pain   Negative: Followed an injury to shoulder   Negative: Shock suspected (e.g., cold/pale/clammy skin, too weak to stand, low BP, rapid pulse)   Negative: Similar pain previously and it was from 'heart attack'   Negative: Similar pain previously and it was from 'angina' and not relieved by nitroglycerin   Negative: Sounds like a life-threatening emergency to the triager    Protocols used: Shoulder Pain-A-[place]

## 2023-09-18 ENCOUNTER — OFFICE VISIT (OUTPATIENT)
Dept: FAMILY MEDICINE | Facility: CLINIC | Age: 43
End: 2023-09-18
Payer: COMMERCIAL

## 2023-09-18 VITALS
HEART RATE: 56 BPM | RESPIRATION RATE: 16 BRPM | HEIGHT: 67 IN | DIASTOLIC BLOOD PRESSURE: 62 MMHG | SYSTOLIC BLOOD PRESSURE: 102 MMHG | OXYGEN SATURATION: 100 % | TEMPERATURE: 97.9 F | BODY MASS INDEX: 25.65 KG/M2 | WEIGHT: 163.4 LBS

## 2023-09-18 DIAGNOSIS — M54.2 NECK PAIN: ICD-10-CM

## 2023-09-18 DIAGNOSIS — Z12.4 CERVICAL CANCER SCREENING: Primary | ICD-10-CM

## 2023-09-18 PROCEDURE — 99213 OFFICE O/P EST LOW 20 MIN: CPT | Mod: 25 | Performed by: NURSE PRACTITIONER

## 2023-09-18 PROCEDURE — 90471 IMMUNIZATION ADMIN: CPT | Performed by: NURSE PRACTITIONER

## 2023-09-18 PROCEDURE — 90686 IIV4 VACC NO PRSV 0.5 ML IM: CPT | Performed by: NURSE PRACTITIONER

## 2023-09-18 ASSESSMENT — PATIENT HEALTH QUESTIONNAIRE - PHQ9
SUM OF ALL RESPONSES TO PHQ QUESTIONS 1-9: 0
SUM OF ALL RESPONSES TO PHQ QUESTIONS 1-9: 0

## 2023-09-18 ASSESSMENT — ASTHMA QUESTIONNAIRES: ACT_TOTALSCORE: 25

## 2023-09-18 ASSESSMENT — PAIN SCALES - GENERAL: PAINLEVEL: NO PAIN (1)

## 2023-09-18 NOTE — PROGRESS NOTES
"  Assessment & Plan         Neck pain  Due to longevity of pain and lack of improvement with home modality treatments.  Patient would like to have imaging completed.  She is open to physical therapy would like to have imaging completed prior to a trial of this.  - MR Cervical Spine w/o Contrast; Future      PE advised  Flu vaccine givne.              BMI:   Estimated body mass index is 25.35 kg/m  as calculated from the following:    Height as of this encounter: 1.71 m (5' 7.32\").    Weight as of this encounter: 74.1 kg (163 lb 6.4 oz).   Weight management plan: Healthy habits as able.        EMMA Langley CNP  [facility] RESHMA Shah is a 42 year old, presenting for the following health issues:  Musculoskeletal Problem        9/18/2023     2:19 PM   Additional Questions   Roomed by YK   Accompanied by self         9/18/2023     2:19 PM   Patient Reported Additional Medications   Patient reports taking the following new medications n/a       Musculoskeletal Problem    History of Present Illness       Back Pain:  She presents for follow up of back pain. Patient's back pain is a chronic problem.  Location of back pain:  Right shoulder  Description of back pain: dull ache and shooting  Back pain spreads: right shoulder and right side of neck    Since patient first noticed back pain, pain is: always present, but gets better and worse  Does back pain interfere with her job:  No       She eats 2-3 servings of fruits and vegetables daily.She consumes 0 sweetened beverage(s) daily.She exercises with enough effort to increase her heart rate 10 to 19 minutes per day.  She exercises with enough effort to increase her heart rate 4 days per week.   She is taking medications regularly.     Patient really is relating that she has right-sided neck pain that radiates into her shoulder and down her back.  She states this has been a chronic issue.  She has tried multiple modalities including " "massage and exercises at home.  She is not noting any improvement.  She thinks this has been going on for greater than 2 years.  Denies overt weakness but having difficulty with range of motion.    Patient is due for physical exam.  Has autoimmune thrombocytopenia and advising vaccines.              Review of Systems   Constitutional, HEENT, cardiovascular, pulmonary, gi and gu systems are negative, except as otherwise noted.      Objective    /62   Pulse 56   Temp 97.9  F (36.6  C) (Temporal)   Resp 16   Ht 1.71 m (5' 7.32\")   Wt 74.1 kg (163 lb 6.4 oz)   SpO2 100%   BMI 25.35 kg/m    Body mass index is 25.35 kg/m .  Physical Exam   GENERAL: healthy, alert and no distress  RESP: lungs clear to auscultation - no rales, rhonchi or wheezes  CV: regular rate and rhythm, normal S1 S2, no S3 or S4, no murmur, click or rub, no peripheral edema and peripheral pulses strong  MS: No gross defects noted.  Patient has tenderness along the paraspinal region on the right side.  Positive muscle tension at this point and lower under the scapular region.  Patient has full range of motion in her extremities however difficulty with neck range of motion.  No vertebral tenderness.  SKIN: no suspicious lesions or rashes  NEURO: Normal strength and tone, mentation intact and speech normal                      "

## 2023-09-27 ENCOUNTER — MYC MEDICAL ADVICE (OUTPATIENT)
Dept: FAMILY MEDICINE | Facility: CLINIC | Age: 43
End: 2023-09-27
Payer: COMMERCIAL

## 2023-10-05 ENCOUNTER — ANCILLARY PROCEDURE (OUTPATIENT)
Dept: MRI IMAGING | Facility: CLINIC | Age: 43
End: 2023-10-05
Attending: NURSE PRACTITIONER
Payer: COMMERCIAL

## 2023-10-05 DIAGNOSIS — M54.2 NECK PAIN: ICD-10-CM

## 2023-10-05 PROCEDURE — 72141 MRI NECK SPINE W/O DYE: CPT | Performed by: RADIOLOGY

## 2023-10-05 NOTE — RESULT ENCOUNTER NOTE
Huseyin Shah, you do have some disc bulging in the C4 -C6 region.  It is not impinging on your spinal cord at this time.  With these types of findings we recommend physical therapy.  Please let me know if you would like to proceed with this.  I would be cautious with cervical manipulation with chiropractic with these findings.  Please reach out with your questions and if you would like physical therapy.    Sincerely,   Tawana Handley, DNP

## 2023-10-27 ENCOUNTER — LAB (OUTPATIENT)
Dept: LAB | Facility: CLINIC | Age: 43
End: 2023-10-27
Payer: COMMERCIAL

## 2023-10-27 DIAGNOSIS — D70.8 AUTOIMMUNE NEUTROPENIA (H): ICD-10-CM

## 2023-10-27 LAB
ACANTHOCYTES BLD QL SMEAR: NORMAL
AUER BODIES BLD QL SMEAR: NORMAL
BASO STIPL BLD QL SMEAR: NORMAL
BASOPHILS # BLD AUTO: 0 10E3/UL (ref 0–0.2)
BASOPHILS NFR BLD AUTO: 1 %
BITE CELLS BLD QL SMEAR: NORMAL
BLISTER CELLS BLD QL SMEAR: NORMAL
BURR CELLS BLD QL SMEAR: NORMAL
DACRYOCYTES BLD QL SMEAR: NORMAL
ELLIPTOCYTES BLD QL SMEAR: NORMAL
EOSINOPHIL # BLD AUTO: 0.1 10E3/UL (ref 0–0.7)
EOSINOPHIL NFR BLD AUTO: 4 %
ERYTHROCYTE [DISTWIDTH] IN BLOOD BY AUTOMATED COUNT: 11.8 % (ref 10–15)
FRAGMENTS BLD QL SMEAR: NORMAL
HCT VFR BLD AUTO: 39.4 % (ref 35–47)
HGB BLD-MCNC: 13.3 G/DL (ref 11.7–15.7)
HGB C CRYSTALS: NORMAL
HOWELL-JOLLY BOD BLD QL SMEAR: NORMAL
IMM GRANULOCYTES # BLD: 0 10E3/UL
IMM GRANULOCYTES NFR BLD: 0 %
LYMPHOCYTES # BLD AUTO: 0.6 10E3/UL (ref 0.8–5.3)
LYMPHOCYTES NFR BLD AUTO: 32 %
MCH RBC QN AUTO: 32.3 PG (ref 26.5–33)
MCHC RBC AUTO-ENTMCNC: 33.8 G/DL (ref 31.5–36.5)
MCV RBC AUTO: 96 FL (ref 78–100)
MONOCYTES # BLD AUTO: 0.4 10E3/UL (ref 0–1.3)
MONOCYTES NFR BLD AUTO: 21 %
NEUTROPHILS # BLD AUTO: 0.8 10E3/UL (ref 1.6–8.3)
NEUTROPHILS NFR BLD AUTO: 42 %
NEUTS HYPERSEG BLD QL SMEAR: NORMAL
NRBC # BLD AUTO: 0 10E3/UL
NRBC BLD AUTO-RTO: 0 /100
PLAT MORPH BLD: NORMAL
PLATELET # BLD AUTO: 178 10E3/UL (ref 150–450)
POLYCHROMASIA BLD QL SMEAR: NORMAL
RBC # BLD AUTO: 4.12 10E6/UL (ref 3.8–5.2)
RBC AGGLUT BLD QL: NORMAL
RBC MORPH BLD: NORMAL
ROULEAUX BLD QL SMEAR: NORMAL
SICKLE CELLS BLD QL SMEAR: NORMAL
SMUDGE CELLS BLD QL SMEAR: NORMAL
SPHEROCYTES BLD QL SMEAR: NORMAL
STOMATOCYTES BLD QL SMEAR: NORMAL
TARGETS BLD QL SMEAR: NORMAL
TOXIC GRANULES BLD QL SMEAR: NORMAL
VARIANT LYMPHS BLD QL SMEAR: NORMAL
WBC # BLD AUTO: 1.9 10E3/UL (ref 4–11)

## 2023-10-27 PROCEDURE — 36415 COLL VENOUS BLD VENIPUNCTURE: CPT

## 2023-10-27 PROCEDURE — 85025 COMPLETE CBC W/AUTO DIFF WBC: CPT

## 2023-10-31 ENCOUNTER — THERAPY VISIT (OUTPATIENT)
Dept: PHYSICAL THERAPY | Facility: CLINIC | Age: 43
End: 2023-10-31
Attending: NURSE PRACTITIONER
Payer: COMMERCIAL

## 2023-10-31 DIAGNOSIS — M54.2 NECK PAIN: ICD-10-CM

## 2023-10-31 DIAGNOSIS — G89.29 CHRONIC RIGHT SHOULDER PAIN: Primary | ICD-10-CM

## 2023-10-31 DIAGNOSIS — M25.519 SHOULDER PAIN, UNSPECIFIED CHRONICITY, UNSPECIFIED LATERALITY: ICD-10-CM

## 2023-10-31 DIAGNOSIS — M25.511 CHRONIC RIGHT SHOULDER PAIN: Primary | ICD-10-CM

## 2023-10-31 PROCEDURE — 97110 THERAPEUTIC EXERCISES: CPT | Mod: GP | Performed by: PHYSICAL THERAPIST

## 2023-10-31 PROCEDURE — 97161 PT EVAL LOW COMPLEX 20 MIN: CPT | Mod: GP | Performed by: PHYSICAL THERAPIST

## 2023-10-31 NOTE — PROGRESS NOTES
PHYSICAL THERAPY EVALUATION  Type of Visit: Evaluation    See electronic medical record for Abuse and Falls Screening details.    Subjective       Presenting condition or subjective complaint: MRI reviled a slight bulge  Date of onset: 10/18/23    Relevant medical history: Asthma; Depression   Dates & types of surgery: On My Chart    Prior diagnostic imaging/testing results: MRI Cervical spondylosis at C4-5 and C5-6 without significant spinal canal  stenosis or neural foraminal narrowing.   Prior therapy history for the same diagnosis, illness or injury: Yes No idea, LONG ago    Prior Level of Function  Transfers:   Ambulation:   ADL:   IADL:     Living Environment  Social support: With a significant other or spouse   Type of home: House   Stairs to enter the home: Yes 2 Is there a railing: No   Ramp: No   Stairs inside the home: Yes 25 Is there a railing: Yes   Help at home: None  Equipment owned:       Employment: Yes   Hobbies/Interests: C[employer], reading others    Patient goals for therapy: not be in pain, more usability, rotating head side to side,    Pain assessment: Pain present     Objective   CERVICAL SPINE EVALUATION  PAIN: Pain Level at Rest: 0/10  Pain Level with Use: 3/10  Pain Location: right UT / upper arm.   Pain Quality: Aching and Numb  Pain Frequency: intermittent  Pain is Exacerbated By: rotating her head. Repetitve arm use like using a mouse.   Pain is Relieved By: massage, rhomboid stretching.   Pain Progression: Unchanged  INTEGUMENTARY (edema, incisions):   POSTURE:   GAIT:   Weightbearing Status:   Assistive Device(s):   Gait Deviations:   BALANCE/PROPRIOCEPTION:   WEIGHTBEARING ALIGNMENT:   ROM:   (Degrees) Left AROM Right AROM    Cervical Flexion     Cervical Extension     Cervical Side bend      Cervical Rotation 68 deg left rotation with left UT pain  70 right with end range pain,     Cervical Protrusion Wnl no pain    Cervical Retraction Central neck pain     Thoracic  Flexion     Thoracic Extension     Thoracic Rotation       Left AROM Left PROM Right AROM Right PROM   Shoulder Flexion WNL  WNL    Shoulder Extension WNL  WNL    Shoulder Abduction WNL  WNL    Shoulder Adduction WNL  WNL    Shoulder IR WNL  WNL    Shoulder ER WNL  WNL    Shoulder Horiz Abduction       Shoulder Horiz Adduction       Pain:   End Feel:   Test Movements:   During: produces, abolishes, increases, decreases, no effect, centralizing, peripheralizing  After: better, worse, no better, no worse, no effect, centralized, peripheralized    Symptomatic response Mechanical response    During testing After testing Effect - increased ROM, decreased ROM, or key functional test No Effect   Pretest symptoms sittin/10 left shoulder blade / upper arm pain     Rep PRO No Effect    No Effect        Rep RET No Effect    No Effect    No effect    Rep RET EXT Increases  Central neck    No Worse    No effect             MYOTOMES: WNL  DTR S:   CORD SIGNS:   DERMATOMES: WNL  NEURAL TENSION:   FLEXIBILITY:    SPECIAL TESTS:   PALPATION:  + pain with palpation over right levator scap and right rhomboid  SPINAL SEGMENTAL CONCLUSIONS:       Assessment & Plan   CLINICAL IMPRESSIONS  Medical Diagnosis: Neck pain  Shoulder pain, unspecified chronicity, unspecified laterality    Treatment Diagnosis: neck / right shoulder pain   Impression/Assessment: Patient is a 42 year old female with chronic neck and right shoulder pain complaints.  The following significant findings have been identified: Pain, Decreased ROM/flexibility, and Decreased activity tolerance. These impairments interfere with their ability to perform self care tasks, work tasks, recreational activities, and household chores as compared to previous level of function.     Clinical Decision Making (Complexity):  Clinical Presentation: Stable/Uncomplicated  Clinical Presentation Rationale: based on medical and personal factors listed in PT evaluation  Clinical Decision  Making (Complexity): Low complexity    PLAN OF CARE  Treatment Interventions:  Modalities: Mechanical Traction  Interventions: Manual Therapy, Neuromuscular Re-education, Therapeutic Activity, Therapeutic Exercise, Self-Care/Home Management    Long Term Goals     PT Goal 1  Goal Identifier: prolonged arm use  Goal Description: pt will be able to work on her computer with mouse for 2 hour with 0/10 PL  Rationale: to maximize safety and independence with performance of ADLs and functional tasks  Goal Progress: with prolonged mouse use at computer arm pain incrase up to 5/10 PL  Target Date: 12/26/23      Frequency of Treatment: 1x/ week  Duration of Treatment: 8 weeks    Recommended Referrals to Other Professionals:   Education Assessment:   Learner/Method: Patient;Demonstration;Pictures/Video;No Barriers to Learning    Risks and benefits of evaluation/treatment have been explained.   Patient/Family/caregiver agrees with Plan of Care.     Evaluation Time:     PT Eval, Low Complexity Minutes (22234): 23   Present: Not applicable     Signing Clinician: Robbie Marin, PT

## 2023-11-07 ENCOUNTER — THERAPY VISIT (OUTPATIENT)
Dept: PHYSICAL THERAPY | Facility: CLINIC | Age: 43
End: 2023-11-07
Attending: NURSE PRACTITIONER
Payer: COMMERCIAL

## 2023-11-07 DIAGNOSIS — M54.2 NECK PAIN: Primary | ICD-10-CM

## 2023-11-07 DIAGNOSIS — M25.511 CHRONIC RIGHT SHOULDER PAIN: ICD-10-CM

## 2023-11-07 DIAGNOSIS — G89.29 CHRONIC RIGHT SHOULDER PAIN: ICD-10-CM

## 2023-11-07 PROCEDURE — 97140 MANUAL THERAPY 1/> REGIONS: CPT | Mod: GP | Performed by: PHYSICAL THERAPIST

## 2023-11-07 PROCEDURE — 97110 THERAPEUTIC EXERCISES: CPT | Mod: GP | Performed by: PHYSICAL THERAPIST

## 2023-11-10 ENCOUNTER — ANCILLARY PROCEDURE (OUTPATIENT)
Dept: MAMMOGRAPHY | Facility: CLINIC | Age: 43
End: 2023-11-10
Attending: NURSE PRACTITIONER
Payer: COMMERCIAL

## 2023-11-10 DIAGNOSIS — Z12.31 VISIT FOR SCREENING MAMMOGRAM: ICD-10-CM

## 2023-11-10 PROCEDURE — 77067 SCR MAMMO BI INCL CAD: CPT | Mod: TC | Performed by: RADIOLOGY

## 2023-11-10 PROCEDURE — 77063 BREAST TOMOSYNTHESIS BI: CPT | Mod: TC | Performed by: RADIOLOGY

## 2023-11-14 ENCOUNTER — THERAPY VISIT (OUTPATIENT)
Dept: PHYSICAL THERAPY | Facility: CLINIC | Age: 43
End: 2023-11-14
Attending: NURSE PRACTITIONER
Payer: COMMERCIAL

## 2023-11-14 DIAGNOSIS — G89.29 CHRONIC RIGHT SHOULDER PAIN: ICD-10-CM

## 2023-11-14 DIAGNOSIS — M25.511 CHRONIC RIGHT SHOULDER PAIN: ICD-10-CM

## 2023-11-14 DIAGNOSIS — M54.2 NECK PAIN: Primary | ICD-10-CM

## 2023-11-14 PROCEDURE — 97110 THERAPEUTIC EXERCISES: CPT | Mod: GP | Performed by: PHYSICAL THERAPIST

## 2023-11-14 PROCEDURE — 97140 MANUAL THERAPY 1/> REGIONS: CPT | Mod: GP | Performed by: PHYSICAL THERAPIST

## 2023-11-29 ENCOUNTER — TELEPHONE (OUTPATIENT)
Dept: ONCOLOGY | Facility: CLINIC | Age: 43
End: 2023-11-29
Payer: COMMERCIAL

## 2023-11-29 NOTE — TELEPHONE ENCOUNTER
COPAY CARD OBTAINED    Medication: ZARXIO 480 MCG/0.8ML IJ ARABELLAY  Sponsor: [employer]  Member ID: undefined  BIN: [id number]  PCN:   Group: [id number]  Expected Copay: $  0  Copay card Monthly Max Amount: $    Copay card Annual Amount: $ 10,000

## 2024-01-18 NOTE — PROGRESS NOTES
11/14/23 0500   Appointment Info   Signing clinician's name / credentials Robbie Marin DPT   Total/Authorized Visits 8 E&T   Visits Used 3   Medical Diagnosis Neck pain  Shoulder pain, unspecified chronicity, unspecified laterality   PT Tx Diagnosis neck / right shoulder pain   Progress Note/Certification   Onset of illness/injury or Date of Surgery 10/18/23   Therapy Frequency 1x/ week   Predicted Duration 8 weeks   Progress Note Due Date 12/26/23   Progress Note Completed Date 10/31/23       Present No   PT Goal 1   Goal Identifier prolonged arm use   Goal Description pt will be able to work on her computer with mouse for 2 hour with 0/10 PL   Rationale to maximize safety and independence with performance of ADLs and functional tasks   Goal Progress with prolonged mouse use at computer arm pain incrase up to 5/10 PL   Target Date 12/26/23   Subjective Report   Subjective Report pt reports she was doing her exercises started radiating the left side and now super stiff and can barely rotate her head. Was doing the traction as well and felt really good but when he released the pressure she had pain again. not the strengthening because she did not do.   Objective Measures   Objective Measures Objective Measure 1;Objective Measure 2;Objective Measure 3;Objective Measure 4   Objective Measure 1   Objective Measure shoulder Eval   Details full ROM and full strength   Objective Measure 2   Objective Measure Cervical ROM   Details flex 64 degleft cervical pain, cervical retraction modertat, right rotation 38, left rotation 38 deg.   Treatment Interventions (PT)   Interventions Therapeutic Procedure/Exercise;Manual Therapy   Therapeutic Procedure/Exercise   Therapeutic Procedures: strength, endurance, ROM, flexibillity minutes (99086) 15   PTRx Ther Proc 1 Cervical Retraction   PTRx Ther Proc 1 - Details x 10 x 5 sec.  educated on assisted manual o/p if uanable to progress into ext.   PTRx Ther  Proc 2 Cervical ROM Extension   PTRx Ther Proc 2 - Details x 10 x 5 sec.    PTRx Ther Proc 3 Seated Cervical Retraction Extension With Overpressure   PTRx Ther Proc 3 - Details did not end up doing.    PTRx Ther Proc 4 Thoracic Extension   PTRx Ther Proc 4 - Details 10 x 5 sec   PTRx Ther Proc 5 Levator Scapulae Stretch   PTRx Ther Proc 5 - Details possibly made worse.    PTRx Ther Proc 6 Cervical ROM Side Bending   PTRx Ther Proc 6 - Details left SB possibly made worse.    Skilled Intervention pt ROM much worse this week. no change after retraction but lost ROM and symptoms worse with flex / left SB so returning to retraction / ext directional preference. did not do strengthening so they are not the concern.   Patient Response/Progress better with traction, no immidate change with flexion feels pull no lasting symptoms.   Therapeutic Activity   PTRx Ther Act 1 Manual Cervical Traction   PTRx Ther Act 1 - Details possibly made worse.    Manual Therapy   Manual Therapy: Mobilization, MFR, MLD, friction massage minutes (77500) 25   Manual Therapy 1 PA mobilization Grade III pt prone for ROM / pain relief. C4-T4   Manual Therapy 1 - Details increase cental neck after better.   Skilled Intervention STM and joint mobilization   Patient Response/Progress ROM increase right rotation 55 deg, left rotation 44 deg.   Manual Therapy 2 bilateral cervical erector spinae pt prone x 10 min   Manual Therapy 2 - Details for pain relief/ ROM   Manual Therapy Manual Therapy 2   Education   Learner/Method Patient;Demonstration;Pictures/Video;No Barriers to Learning   Plan   Home program return to retraction / w/ w/o o/p   Plan for next session traction / flex made worse return to retraction if better progress force if same or worse right SB .   Total Session Time   Timed Code Treatment Minutes 40   Total Treatment Time (sum of timed and untimed services) 40         DISCHARGE  Reason for Discharge: Patient chooses to discontinue  therapy.    Equipment Issued:     Discharge Plan: Patient to continue home program.    Referring Provider:  Tawana Handley

## 2024-01-26 ENCOUNTER — LAB (OUTPATIENT)
Dept: LAB | Facility: CLINIC | Age: 44
End: 2024-01-26
Payer: COMMERCIAL

## 2024-01-26 DIAGNOSIS — D70.8 AUTOIMMUNE NEUTROPENIA (H): ICD-10-CM

## 2024-01-26 LAB
BASOPHILS # BLD AUTO: ABNORMAL 10*3/UL
BASOPHILS # BLD MANUAL: 0 10E3/UL (ref 0–0.2)
BASOPHILS NFR BLD AUTO: ABNORMAL %
BASOPHILS NFR BLD MANUAL: 1 %
DACRYOCYTES BLD QL SMEAR: SLIGHT
ELLIPTOCYTES BLD QL SMEAR: SLIGHT
EOSINOPHIL # BLD AUTO: ABNORMAL 10*3/UL
EOSINOPHIL # BLD MANUAL: 0 10E3/UL (ref 0–0.7)
EOSINOPHIL NFR BLD AUTO: ABNORMAL %
EOSINOPHIL NFR BLD MANUAL: 0 %
ERYTHROCYTE [DISTWIDTH] IN BLOOD BY AUTOMATED COUNT: 12.4 % (ref 10–15)
HCT VFR BLD AUTO: 40.3 % (ref 35–47)
HGB BLD-MCNC: 13.5 G/DL (ref 11.7–15.7)
IMM GRANULOCYTES # BLD: ABNORMAL 10*3/UL
IMM GRANULOCYTES NFR BLD: ABNORMAL %
LYMPHOCYTES # BLD AUTO: ABNORMAL 10*3/UL
LYMPHOCYTES # BLD MANUAL: 1.1 10E3/UL (ref 0.8–5.3)
LYMPHOCYTES NFR BLD AUTO: ABNORMAL %
LYMPHOCYTES NFR BLD MANUAL: 45 %
MCH RBC QN AUTO: 31.7 PG (ref 26.5–33)
MCHC RBC AUTO-ENTMCNC: 33.5 G/DL (ref 31.5–36.5)
MCV RBC AUTO: 95 FL (ref 78–100)
MONOCYTES # BLD AUTO: ABNORMAL 10*3/UL
MONOCYTES # BLD MANUAL: 0.4 10E3/UL (ref 0–1.3)
MONOCYTES NFR BLD AUTO: ABNORMAL %
MONOCYTES NFR BLD MANUAL: 17 %
NEUTROPHILS # BLD AUTO: ABNORMAL 10*3/UL
NEUTROPHILS # BLD MANUAL: 0.9 10E3/UL (ref 1.6–8.3)
NEUTROPHILS NFR BLD AUTO: ABNORMAL %
NEUTROPHILS NFR BLD MANUAL: 37 %
NRBC # BLD AUTO: 0 10E3/UL
NRBC BLD AUTO-RTO: 1 /100
PLAT MORPH BLD: ABNORMAL
PLATELET # BLD AUTO: 223 10E3/UL (ref 150–450)
RBC # BLD AUTO: 4.26 10E6/UL (ref 3.8–5.2)
RBC MORPH BLD: ABNORMAL
WBC # BLD AUTO: 2.5 10E3/UL (ref 4–11)

## 2024-01-26 PROCEDURE — 85027 COMPLETE CBC AUTOMATED: CPT

## 2024-01-26 PROCEDURE — 85007 BL SMEAR W/DIFF WBC COUNT: CPT

## 2024-01-26 PROCEDURE — 36415 COLL VENOUS BLD VENIPUNCTURE: CPT

## 2024-03-14 ENCOUNTER — PATIENT OUTREACH (OUTPATIENT)
Dept: CARE COORDINATION | Facility: CLINIC | Age: 44
End: 2024-03-14
Payer: COMMERCIAL

## 2024-03-20 ASSESSMENT — PATIENT HEALTH QUESTIONNAIRE - PHQ9
SUM OF ALL RESPONSES TO PHQ QUESTIONS 1-9: 3
10. IF YOU CHECKED OFF ANY PROBLEMS, HOW DIFFICULT HAVE THESE PROBLEMS MADE IT FOR YOU TO DO YOUR WORK, TAKE CARE OF THINGS AT HOME, OR GET ALONG WITH OTHER PEOPLE: SOMEWHAT DIFFICULT
SUM OF ALL RESPONSES TO PHQ QUESTIONS 1-9: 3

## 2024-03-20 ASSESSMENT — ASTHMA QUESTIONNAIRES
QUESTION_5 LAST FOUR WEEKS HOW WOULD YOU RATE YOUR ASTHMA CONTROL: COMPLETELY CONTROLLED
ACT_TOTALSCORE: 25
ACT_TOTALSCORE: 25
QUESTION_3 LAST FOUR WEEKS HOW OFTEN DID YOUR ASTHMA SYMPTOMS (WHEEZING, COUGHING, SHORTNESS OF BREATH, CHEST TIGHTNESS OR PAIN) WAKE YOU UP AT NIGHT OR EARLIER THAN USUAL IN THE MORNING: NOT AT ALL
QUESTION_2 LAST FOUR WEEKS HOW OFTEN HAVE YOU HAD SHORTNESS OF BREATH: NOT AT ALL
QUESTION_1 LAST FOUR WEEKS HOW MUCH OF THE TIME DID YOUR ASTHMA KEEP YOU FROM GETTING AS MUCH DONE AT WORK, SCHOOL OR AT HOME: NONE OF THE TIME
QUESTION_4 LAST FOUR WEEKS HOW OFTEN HAVE YOU USED YOUR RESCUE INHALER OR NEBULIZER MEDICATION (SUCH AS ALBUTEROL): NOT AT ALL

## 2024-03-21 ENCOUNTER — OFFICE VISIT (OUTPATIENT)
Dept: FAMILY MEDICINE | Facility: CLINIC | Age: 44
End: 2024-03-21
Payer: COMMERCIAL

## 2024-03-21 VITALS
BODY MASS INDEX: 24.71 KG/M2 | SYSTOLIC BLOOD PRESSURE: 100 MMHG | HEIGHT: 67 IN | TEMPERATURE: 98.3 F | WEIGHT: 157.44 LBS | HEART RATE: 77 BPM | OXYGEN SATURATION: 98 % | RESPIRATION RATE: 17 BRPM | DIASTOLIC BLOOD PRESSURE: 60 MMHG

## 2024-03-21 DIAGNOSIS — F33.42 RECURRENT MAJOR DEPRESSION IN COMPLETE REMISSION (H): ICD-10-CM

## 2024-03-21 DIAGNOSIS — D70.8 AUTOIMMUNE NEUTROPENIA (H): ICD-10-CM

## 2024-03-21 DIAGNOSIS — L30.9 DERMATITIS: Primary | ICD-10-CM

## 2024-03-21 DIAGNOSIS — Z87.19 S/P HERNIA REPAIR: ICD-10-CM

## 2024-03-21 DIAGNOSIS — N92.0 MENORRHAGIA WITH REGULAR CYCLE: ICD-10-CM

## 2024-03-21 DIAGNOSIS — Z98.890 S/P HERNIA REPAIR: ICD-10-CM

## 2024-03-21 PROCEDURE — 99214 OFFICE O/P EST MOD 30 MIN: CPT | Performed by: NURSE PRACTITIONER

## 2024-03-21 RX ORDER — NORETHINDRONE AND ETHINYL ESTRADIOL 1 MG-35MCG
KIT ORAL
Qty: 112 TABLET | Refills: 1 | Status: SHIPPED | OUTPATIENT
Start: 2024-03-21 | End: 2024-05-23

## 2024-03-21 ASSESSMENT — ENCOUNTER SYMPTOMS
WHEEZING: 1
HEADACHES: 1

## 2024-03-21 ASSESSMENT — PAIN SCALES - GENERAL: PAINLEVEL: MODERATE PAIN (5)

## 2024-03-21 NOTE — LETTER
My Asthma Action Plan    Name: Alyssa Manzano   YOB: 1980  Date: 3/21/2024   My doctor: EMMA Langley CNP   My clinic: [facility]        My Rescue Medicine:   Albuterol inhaler (Proair/Ventolin/Proventil HFA)  2-4 puffs EVERY 4 HOURS as needed. Use a spacer if recommended by your provider.   My Asthma Severity:   Intermittent / Exercise Induced  Know your asthma triggers:  none  None          GREEN ZONE   Good Control  I feel good  No cough or wheeze  Can work, sleep and play without asthma symptoms       Take your asthma control medicine every day.     If exercise triggers your asthma, take your rescue medication  15 minutes before exercise or sports, and  During exercise if you have asthma symptoms  Spacer to use with inhaler: If you have a spacer, make sure to use it with your inhaler             YELLOW ZONE Getting Worse  I have ANY of these:  I do not feel good  Cough or wheeze  Chest feels tight  Wake up at night   Keep taking your Green Zone medications  Start taking your rescue medicine:  every 20 minutes for up to 1 hour. Then every 4 hours for 24-48 hours.  If you stay in the Yellow Zone for more than 12-24 hours, contact your doctor.  If you do not return to the Green Zone in 12-24 hours or you get worse, start taking your oral steroid medicine if prescribed by your provider.           RED ZONE Medical Alert - Get Help  I have ANY of these:  I feel awful  Medicine is not helping  Breathing getting harder  Trouble walking or talking  Nose opens wide to breathe       Take your rescue medicine NOW  If your provider has prescribed an oral steroid medicine, start taking it NOW  Call your doctor NOW  If you are still in the Red Zone after 20 minutes and you have not reached your doctor:  Take your rescue medicine again and  Call 911 or go to the emergency room right away    See your regular doctor within 2 weeks of an Emergency Room or Urgent Care visit for  follow-up treatment.          Annual Reminders:  Meet with Asthma Educator,  Flu Shot in the Fall, consider Pneumonia Vaccination for patients with asthma (aged 19 and older).    Pharmacy:    [facility] - [place], [place] - [address]  [id number] - [place], [place] - [address]  [facility] - [place], [place] - [address] [facility] - [place], [place] - [address]    Electronically signed by EMMA Langley CNP   Date: 03/21/24                    Asthma Triggers  How To Control Things That Make Your Asthma Worse    Triggers are things that make your asthma worse.  Look at the list below to help you find your triggers and   what you can do about them. You can help prevent asthma flare-ups by staying away from your triggers.      Trigger                                                          What you can do   Cigarette Smoke  Tobacco smoke can make asthma worse. Do not allow smoking in your home, car or around you.  Be sure no one smokes at a child s day care or school.  If you smoke, ask your health care provider for ways to help you quit.  Ask family members to quit too.  Ask your health care provider for a referral to Quit Plan to help you quit smoking, or call 1-585-872-PLAN.     Colds, Flu, Bronchitis  These are common triggers of asthma. Wash your hands often.  Don t touch your eyes, nose or mouth.  Get a flu shot every year.     Dust Mites  These are tiny bugs that live in cloth or carpet. They are too small to see. Wash sheets and blankets in hot water every week.   Encase pillows and mattress in dust mite proof covers.  Avoid having carpet if you can. If you have carpet, vacuum weekly.   Use a dust mask and HEPA vacuum.   Pollen and Outdoor Mold  Some people are allergic to trees, grass, or weed pollen, or molds. Try to keep your windows closed.  Limit time out doors when pollen count is high.   Ask you  health care provider about taking medicine during allergy season.     Animal Dander  Some people are allergic to skin flakes, urine or saliva from pets with fur or feathers. Keep pets with fur or feathers out of your home.    If you can t keep the pet outdoors, then keep the pet out of your bedroom.  Keep the bedroom door closed.  Keep pets off cloth furniture and away from stuffed toys.     Mice, Rats, and Cockroaches  Some people are allergic to the waste from these pests.   Cover food and garbage.  Clean up spills and food crumbs.  Store grease in the refrigerator.   Keep food out of the bedroom.   Indoor Mold  This can be a trigger if your home has high moisture. Fix leaking faucets, pipes, or other sources of water.   Clean moldy surfaces.  Dehumidify basement if it is damp and smelly.   Smoke, Strong Odors, and Sprays  These can reduce air quality. Stay away from strong odors and sprays, such as perfume, powder, hair spray, paints, smoke incense, paint, cleaning products, candles and new carpet.   Exercise or Sports  Some people with asthma have this trigger. Be active!  Ask your doctor about taking medicine before sports or exercise to prevent symptoms.    Warm up for 5-10 minutes before and after sports or exercise.     Other Triggers of Asthma  Cold air:  Cover your nose and mouth with a scarf.  Sometimes laughing or crying can be a trigger.  Some medicines and food can trigger asthma.

## 2024-03-21 NOTE — PROGRESS NOTES
"  Assessment & Plan     Autoimmune neutropenia (H24)  - continue plan.     Recurrent major depression in complete remission (H24)  Doing well, monitoring.     Dermatitis  Unclear etiology, could be food allergy, discussed warning signs, protein changes and symptoms. Advised to start keeping track if symptoms with foods    - Adult Allergy/Asthma  Referral; Future    S/p hernia repair-  No current worries, ok for stretching and core strengthening, return if symptoms change.                   MEDICATIONS:  Continue current medications without change    Subjective   Alyssa is a 43 year old, presenting for the following health issues:  Headache, Hernia, and Allergies      3/21/2024    10:25 AM   Additional Questions   Roomed by VE         3/21/2024    10:25 AM   Patient Reported Additional Medications   Patient reports taking the following new medications Medical Marijuana     Headache     Allergies  Associated symptoms include headaches.   History of Present Illness       Headaches:   Since the patient's last clinic visit, headaches are: improved  The patient is getting headaches:  Was weekly, now its been a ehile since i had one  She is not able to do normal daily activities when she has a migraine.  The patient is taking the following rescue/relief medications:  Ibuprofen (Advil, Motrin) and Tylenol   Patient states \"I get total relief\" from the rescue/relief medications.   The patient is taking the following medications to prevent migraines:  No medications to prevent migraines  In the past 4 weeks, the patient has gone to an Urgent Care or Emergency Room 0 times times due to headaches.    She eats 4 or more servings of fruits and vegetables daily.She consumes 1 sweetened beverage(s) daily.She exercises with enough effort to increase her heart rate 20 to 29 minutes per day.  She exercises with enough effort to increase her heart rate 4 days per week.   She is taking medications regularly.         Discuss " "possible food allergies and testing-no irritation with throat almonds and apples.  Patient is able to eat bananas.  Occasionally getting dermatitis behind flexural surfaces.      SHe has medical marijuana use.  Occasional nausea.  States this comes with her Zarxio injection routinely the same day after injection with ass HA. Minimal use of NSAIDS. Gets 6-8 hours of sleep, occ. Awakens with HA- generally if at 6 hours of sleep. OTC analgesics work, fully, but again Listening to thisrare use.     Feels stress is low.                Hernia  -feels like the mesh may have moved , right side.  Occasional sharp pain that feels hard.  No issues with exercising.  Has not felt any bulging or weakness in her abdominal wall.  No issues with constipation.            Review of Systems  Constitutional, HEENT, cardiovascular, pulmonary, gi and gu systems are negative, except as otherwise noted.      Objective    /60 (BP Location: Left arm, Patient Position: Chair, Cuff Size: Adult Regular)   Pulse 77   Temp 98.3  F (36.8  C) (Temporal)   Resp 17   Ht 1.708 m (5' 7.25\")   Wt 71.4 kg (157 lb 7 oz)   LMP  (LMP Unknown)   SpO2 98%   Breastfeeding No   BMI 24.48 kg/m    Body mass index is 24.48 kg/m .  Physical Exam   Intimate interest that would be nice to make dinner insteadGENERAL: alert and no distress  NECK: no adenopathy, no asymmetry, masses, or scars  RESP: lungs clear to auscultation - no rales, rhonchi or wheezes  CV: regular rate and rhythm, normal S1 S2, no S3 or S4, no murmur, click or rub, no peripheral edema  ABDOMEN: soft, nontender, no hepatosplenomegaly, no masses and bowel sounds normal- mesh seems intact without hernia- no protrusions/weakness with abdominal muscle stressing.   NEURO: Normal strength and tone, mentation intact and speech normal  PSYCH: mentation appears normal, affect normal/bright            Signed Electronically by: EMMA Langley CNP    "

## 2024-03-22 DIAGNOSIS — N92.0 MENORRHAGIA WITH REGULAR CYCLE: ICD-10-CM

## 2024-03-22 RX ORDER — NORETHINDRONE AND ETHINYL ESTRADIOL 1 MG-35MCG
KIT ORAL
Qty: 112 TABLET | Refills: 3 | OUTPATIENT
Start: 2024-03-22

## 2024-03-28 ENCOUNTER — PATIENT OUTREACH (OUTPATIENT)
Dept: CARE COORDINATION | Facility: CLINIC | Age: 44
End: 2024-03-28
Payer: COMMERCIAL

## 2024-04-26 ENCOUNTER — LAB (OUTPATIENT)
Dept: LAB | Facility: CLINIC | Age: 44
End: 2024-04-26
Payer: COMMERCIAL

## 2024-04-26 DIAGNOSIS — D70.8 AUTOIMMUNE NEUTROPENIA (H): ICD-10-CM

## 2024-04-26 LAB
BASOPHILS # BLD AUTO: 0 10E3/UL (ref 0–0.2)
BASOPHILS NFR BLD AUTO: 1 %
EOSINOPHIL # BLD AUTO: 0.1 10E3/UL (ref 0–0.7)
EOSINOPHIL NFR BLD AUTO: 1 %
ERYTHROCYTE [DISTWIDTH] IN BLOOD BY AUTOMATED COUNT: 12 % (ref 10–15)
HCT VFR BLD AUTO: 39.3 % (ref 35–47)
HGB BLD-MCNC: 13.2 G/DL (ref 11.7–15.7)
IMM GRANULOCYTES # BLD: 0.1 10E3/UL
IMM GRANULOCYTES NFR BLD: 2 %
LYMPHOCYTES # BLD AUTO: 0.7 10E3/UL (ref 0.8–5.3)
LYMPHOCYTES NFR BLD AUTO: 17 %
MCH RBC QN AUTO: 32.1 PG (ref 26.5–33)
MCHC RBC AUTO-ENTMCNC: 33.6 G/DL (ref 31.5–36.5)
MCV RBC AUTO: 96 FL (ref 78–100)
MONOCYTES # BLD AUTO: 0.6 10E3/UL (ref 0–1.3)
MONOCYTES NFR BLD AUTO: 14 %
NEUTROPHILS # BLD AUTO: 2.9 10E3/UL (ref 1.6–8.3)
NEUTROPHILS NFR BLD AUTO: 66 %
PLATELET # BLD AUTO: 173 10E3/UL (ref 150–450)
RBC # BLD AUTO: 4.11 10E6/UL (ref 3.8–5.2)
WBC # BLD AUTO: 4.5 10E3/UL (ref 4–11)

## 2024-04-26 PROCEDURE — 85025 COMPLETE CBC W/AUTO DIFF WBC: CPT

## 2024-04-26 PROCEDURE — 36415 COLL VENOUS BLD VENIPUNCTURE: CPT

## 2024-05-23 ENCOUNTER — OFFICE VISIT (OUTPATIENT)
Dept: FAMILY MEDICINE | Facility: CLINIC | Age: 44
End: 2024-05-23
Payer: COMMERCIAL

## 2024-05-23 VITALS
BODY MASS INDEX: 24.01 KG/M2 | SYSTOLIC BLOOD PRESSURE: 100 MMHG | HEIGHT: 67 IN | TEMPERATURE: 98.1 F | HEART RATE: 79 BPM | DIASTOLIC BLOOD PRESSURE: 64 MMHG | RESPIRATION RATE: 12 BRPM | WEIGHT: 153 LBS | OXYGEN SATURATION: 100 %

## 2024-05-23 DIAGNOSIS — Z12.31 ENCOUNTER FOR SCREENING MAMMOGRAM FOR BREAST CANCER: ICD-10-CM

## 2024-05-23 DIAGNOSIS — Z12.4 CERVICAL CANCER SCREENING: ICD-10-CM

## 2024-05-23 DIAGNOSIS — R87.810 CERVICAL HIGH RISK HPV (HUMAN PAPILLOMAVIRUS) TEST POSITIVE: ICD-10-CM

## 2024-05-23 DIAGNOSIS — Z23 HIGH PRIORITY FOR 2019-NCOV VACCINE: ICD-10-CM

## 2024-05-23 DIAGNOSIS — N92.0 MENORRHAGIA WITH REGULAR CYCLE: ICD-10-CM

## 2024-05-23 DIAGNOSIS — D70.8 AUTOIMMUNE NEUTROPENIA (H): ICD-10-CM

## 2024-05-23 DIAGNOSIS — Z00.00 ROUTINE MEDICAL EXAM: Primary | ICD-10-CM

## 2024-05-23 LAB
CHOLEST SERPL-MCNC: 140 MG/DL
FASTING STATUS PATIENT QL REPORTED: YES
HBA1C MFR BLD: 4.9 % (ref 0–5.6)
HDLC SERPL-MCNC: 56 MG/DL
LDLC SERPL CALC-MCNC: 63 MG/DL
NONHDLC SERPL-MCNC: 84 MG/DL
TRIGL SERPL-MCNC: 105 MG/DL
TSH SERPL DL<=0.005 MIU/L-ACNC: 1.45 UIU/ML (ref 0.3–4.2)

## 2024-05-23 PROCEDURE — 90480 ADMN SARSCOV2 VAC 1/ONLY CMP: CPT | Performed by: NURSE PRACTITIONER

## 2024-05-23 PROCEDURE — 83036 HEMOGLOBIN GLYCOSYLATED A1C: CPT | Performed by: NURSE PRACTITIONER

## 2024-05-23 PROCEDURE — 36415 COLL VENOUS BLD VENIPUNCTURE: CPT | Performed by: NURSE PRACTITIONER

## 2024-05-23 PROCEDURE — 87624 HPV HI-RISK TYP POOLED RSLT: CPT | Performed by: NURSE PRACTITIONER

## 2024-05-23 PROCEDURE — 99396 PREV VISIT EST AGE 40-64: CPT | Mod: 25 | Performed by: NURSE PRACTITIONER

## 2024-05-23 PROCEDURE — 91320 SARSCV2 VAC 30MCG TRS-SUC IM: CPT | Performed by: NURSE PRACTITIONER

## 2024-05-23 PROCEDURE — G0145 SCR C/V CYTO,THINLAYER,RESCR: HCPCS | Performed by: NURSE PRACTITIONER

## 2024-05-23 PROCEDURE — 84443 ASSAY THYROID STIM HORMONE: CPT | Performed by: NURSE PRACTITIONER

## 2024-05-23 PROCEDURE — 80061 LIPID PANEL: CPT | Performed by: NURSE PRACTITIONER

## 2024-05-23 RX ORDER — NORETHINDRONE AND ETHINYL ESTRADIOL 1 MG-35MCG
KIT ORAL
Qty: 112 TABLET | Refills: 3 | Status: SHIPPED | OUTPATIENT
Start: 2024-05-23

## 2024-05-23 SDOH — HEALTH STABILITY: PHYSICAL HEALTH: ON AVERAGE, HOW MANY DAYS PER WEEK DO YOU ENGAGE IN MODERATE TO STRENUOUS EXERCISE (LIKE A BRISK WALK)?: 3 DAYS

## 2024-05-23 SDOH — HEALTH STABILITY: PHYSICAL HEALTH: ON AVERAGE, HOW MANY MINUTES DO YOU ENGAGE IN EXERCISE AT THIS LEVEL?: 30 MIN

## 2024-05-23 ASSESSMENT — SOCIAL DETERMINANTS OF HEALTH (SDOH): HOW OFTEN DO YOU GET TOGETHER WITH FRIENDS OR RELATIVES?: ONCE A WEEK

## 2024-05-23 ASSESSMENT — PAIN SCALES - GENERAL: PAINLEVEL: NO PAIN (0)

## 2024-05-23 NOTE — PATIENT INSTRUCTIONS
"Preventive Care Advice   This is general advice we often give to help people stay healthy. Your care team may have specific advice just for you. Please talk to your care team about your own preventive care needs.  Lifestyle  Exercise at least 150 minutes each week (30 minutes a day, 5 days a week).  Do muscle strengthening activities 2 days a week. These help control your weight and prevent disease.  No smoking.  Wear sunscreen to prevent skin cancer.  Have your home tested for radon every 2 to 5 years. Radon is a colorless, odorless gas that can harm your lungs. To learn more, go to www.health.[facility].[place]. and search for \"Radon in Homes.\"  Keep guns unloaded and locked up in a safe place like a safe or gun vault, or, use a gun lock and hide the keys. Always lock away bullets separately. To learn more, visit [employer].[place].gov and search for \"safe gun storage.\"  Nutrition  Eat 5 or more servings of fruits and vegetables each day.  Try wheat bread, brown rice and whole grain pasta (instead of white bread, rice, and pasta).  Get enough calcium and vitamin D. Check the label on foods and aim for 100% of the RDA (recommended daily allowance).  Regular exams  Have a dental exam and cleaning every 6 months.  See your health care team every year to talk about:  Any changes in your health.  Any medicines your care team has prescribed.  Preventive care, family planning, and ways to prevent chronic diseases.  Shots (vaccines)   HPV shots (up to age 26), if you've never had them before.  Hepatitis B shots (up to age 59), if you've never had them before.  COVID-19 shot: Get this shot when it's due.  Flu shot: Get a flu shot every year.  Tetanus shot: Get a tetanus shot every 10 years.  Pneumococcal, hepatitis A, and RSV shots: Ask your care team if you need these based on your risk.  Shingles shot (for age 50 and up).  General health tests  Diabetes screening:  Starting at age 35, Get screened for diabetes at least every 3 years.  If " you are younger than age 35, ask your care team if you should be screened for diabetes.  Cholesterol test: At age 39, start having a cholesterol test every 5 years, or more often if advised.  Bone density scan (DEXA): At age 50, ask your care team if you should have this scan for osteoporosis (brittle bones).  Hepatitis C: Get tested at least once in your life.  Abdominal aortic aneurysm screening: Talk to your doctor about having this screening if you:  Have ever smoked; and  Are biologically male; and  Are between the ages of 65 and 75.  STIs (sexually transmitted infections)  Before age 24: Ask your care team if you should be screened for STIs.  After age 24: Get screened for STIs if you're at risk. You are at risk for STIs (including HIV) if:  You are sexually active with more than one person.  You don't use condoms every time.  You or a partner was diagnosed with a sexually transmitted infection.  If you are at risk for HIV, ask about PrEP medicine to prevent HIV.  Get tested for HIV at least once in your life, whether you are at risk for HIV or not.  Cancer screening tests  Cervical cancer screening: If you have a cervix, begin getting regular cervical cancer screening tests at age 21. Most people who have regular screenings with normal results can stop after age 65. Talk about this with your provider.  Breast cancer scan (mammogram): If you've ever had breasts, begin having regular mammograms starting at age 40. This is a scan to check for breast cancer.  Colon cancer screening: It is important to start screening for colon cancer at age 45.  Have a colonoscopy test every 10 years (or more often if you're at risk) Or, ask your provider about stool tests like a FIT test every year or Cologuard test every 3 years.  To learn more about your testing options, visit: www.[employer]/915398.pdf.  For help making a decision, visit: alia/js62486.  Prostate cancer screening test: If you have a prostate and are age 55  to 69, ask your provider if you would benefit from a yearly prostate cancer screening test.  Lung cancer screening: If you are a current or former smoker age 50 to 80, ask your care team if ongoing lung cancer screenings are right for you.  For informational purposes only. Not to replace the advice of your health care provider. Copyright   2023 [employer]. All rights reserved. Clinically reviewed by the [facility]. [employer] 589800 - REV 04/24.

## 2024-05-23 NOTE — PROGRESS NOTES
Preventive Care Visit  [facility] EMMA Phillips CNP, Family Medicine  May 23, 2024      Assessment & Plan     Routine medical exam  Immunization recommendations reviewed and ordered per discussion and patient agreement.    Discussed medication coverage for vaccines, and if appropriate to have done at pharmacy.  Discussed vaginal anatomy, currently normal may be    ? Maybe Stage I prolapse but still internal.  Could consider options of monitoring-recommended, discussed intercourse position changes, and offered women's health physical therapy-declined.    - Adult Dermatology  Referral; Future  - Lipid panel reflex to direct LDL Fasting; Future  - Hemoglobin A1c; Future  - TSH with free T4 reflex; Future  - Lipid panel reflex to direct LDL Fasting  - Hemoglobin A1c  - TSH with free T4 reflex    Menorrhagia continue plan.  With regular cycle  Continue plan  - norethindrone-ethinyl estradiol (NORTREL 1/35, 28,) 1-35 MG-MCG tablet; TAKE ONE TABLET BY MOUTH ONCE DAILY ACTIVE PILLS CONTINUOUSLY    Cervical cancer screening  Screening.  - Pap Screen with HPV - Recommended Age 30 - 65 Years    Autoimmune neutropenia (H24)  Discussed routine skin exam with immune deficiency with medications.  - Adult Dermatology  Referral; Future    Encounter for screening mammogram for breast cancer    - MA Screen Bilateral w/Link; Future    High priority for 2019-nCoV vaccine  Updated    Cervical high risk HPV (human papillomavirus) test positive  Monitoring.    Patient has been advised of split billing requirements and indicates understanding: No        Counseling  Appropriate preventive services were discussed with this patient, including applicable screening as appropriate for fall prevention, nutrition, physical activity, Tobacco-use cessation, weight loss and cognition.  Checklist reviewing preventive services available has been given to the patient.  Reviewed patient's diet, addressing  "concerns and/or questions.   She is at risk for lack of exercise and has been provided with information to increase physical activity for the benefit of her well-being.       MEDICATIONS:  Continue current medications without change    Subjective   Alyssa is a 43 year old, presenting for the following:  Physical        5/23/2024     9:18 AM   Additional Questions   Roomed by Brandi Erickson CMA   Accompanied by self         5/23/2024     9:18 AM   Patient Reported Additional Medications   Patient reports taking the following new medications none        Health Care Directive  Patient does not have a Health Care Directive or Living Will: Discussed advance care planning with patient; however, patient declined at this time.    HPI  On continuous OCP for menorrhagia with regular cycle.  Also history of autoimmune neutropenia.  Patient had been off birth control for approximately 2 weeks and did not have a withdrawal bleed.  She is status post tubal ligation.  Wants to continue  hormone management for dysmenorrhea.        Concern - \"feels like insides are coming out\"-when asked in more depth, patient feels like certain positions with intercourse can be uncomfortable.  Generally it is resolved with position changes.  Does not see any external vaginal tissue outside of the introitus.  Patient did have traumatic birth with vacuum and tailbone injury with first vaginal delivery.  Onset: \"for awhile\"  Description: \"like I can feel it or see it\"  Intensity: mild, moderate  Progression of Symptoms:  same  Accompanying Signs & Symptoms: pain during sex  Previous history of similar problem: n/a  Precipitating factors:        Worsened by: none  Alleviating factors:        Improved by: none  Therapies tried and outcome:  none     Still having neck popping when moving neck but pain has gone            5/23/2024   General Health   How would you rate your overall physical health? Good   Feel stress (tense, anxious, or unable to sleep) " Not at all         5/23/2024   Nutrition   Three or more servings of calcium each day? Yes   Diet: Vegetarian/vegan   How many servings of fruit and vegetables per day? 4 or more   How many sweetened beverages each day? 0-1         5/23/2024   Exercise   Days per week of moderate/strenous exercise 3 days   Average minutes spent exercising at this level 30 min         5/23/2024   Social Factors   Frequency of gathering with friends or relatives Once a week   Worry food won't last until get money to buy more No   Food not last or not have enough money for food? No   Do you have housing?  Yes   Are you worried about losing your housing? No   Lack of transportation? No   Unable to get utilities (heat,electricity)? No         5/23/2024   Dental   Dentist two times every year? Yes         5/23/2024   TB Screening   Were you born outside of the [place]? No         Today's PHQ-9 Score:       3/20/2024    10:43 AM   PHQ-9 SCORE   PHQ-9 Total Score MyChart 3 (Minimal depression)   PHQ-9 Total Score 3           5/23/2024   Substance Use   Alcohol more than 3/day or more than 7/wk No   Do you use any other substances recreationally? (!) CANNABIS PRODUCTS     Social History     Tobacco Use    Smoking status: Never     Passive exposure: Never    Smokeless tobacco: Never   Vaping Use    Vaping status: Never Used   Substance Use Topics    Alcohol use: Not Currently     Comment: 4 drinks per week    Drug use: Yes     Types: Marijuana     Comment: RX-           11/10/2023   LAST FHS-7 RESULTS   1st degree relative breast or ovarian cancer No   Any relative bilateral breast cancer No   Any male have breast cancer No   Any ONE woman have BOTH breast AND ovarian cancer No   Any woman with breast cancer before 50yrs Yes   2 or more relatives with breast AND/OR ovarian cancer Yes   2 or more relatives with breast AND/OR bowel cancer No        Mammogram Screening - Mammogram every 1-2 years updated in Health Maintenance based on mutual decision  "making        5/23/2024   STI Screening   New sexual partner(s) since last STI/HIV test? No     History of abnormal Pap smear: YES -normal pathology last year from colposcopy.  Positive high risk HPV.  No Paps.    other categories - see link Cervical Cytology Screening Guidelines        Latest Ref Rng & Units 4/13/2023     9:29 AM 12/13/2021     4:26 PM 11/3/2016     5:05 PM   PAP / HPV   PAP  Negative for Intraepithelial Lesion or Malignancy (NILM)  Negative for Intraepithelial Lesion or Malignancy (NILM)     HPV 16 DNA Negative Negative  Negative  Negative    HPV 18 DNA Negative Positive  Positive  Negative    Other HR HPV Negative Negative  Negative  Negative      ASCVD Risk   The 10-year ASCVD risk score (Drew DORMAN, et al., 2019) is: 0.2%    Values used to calculate the score:      Age: 43 years      Sex: Female      Is Non- : No      Diabetic: No      Tobacco smoker: No      Systolic Blood Pressure: 100 mmHg      Is BP treated: No      HDL Cholesterol: 72 mg/dL      Total Cholesterol: 155 mg/dL       Reviewed and updated as needed this visit by Provider                          Review of Systems  Constitutional, HEENT, cardiovascular, pulmonary, GI, , musculoskeletal, neuro, skin, endocrine and psych systems are negative, except as otherwise noted.     Objective    Exam  /64   Pulse 79   Temp 98.1  F (36.7  C) (Temporal)   Resp 12   Ht 1.714 m (5' 7.48\")   Wt 69.4 kg (153 lb)   LMP  (LMP Unknown)   SpO2 100%   Breastfeeding No   BMI 23.62 kg/m     Estimated body mass index is 23.62 kg/m  as calculated from the following:    Height as of this encounter: 1.714 m (5' 7.48\").    Weight as of this encounter: 69.4 kg (153 lb).    Physical Exam  GENERAL: alert and no distress  EYES: Eyes grossly normal to inspection, PERRL and conjunctivae and sclerae normal  HENT: ear canals and TM's normal, nose and mouth without ulcers or lesions  NECK: no adenopathy, no asymmetry, " masses, or scars  RESP: lungs clear to auscultation - no rales, rhonchi or wheezes  BREAST: normal without masses, tenderness or nipple discharge and no palpable axillary masses or adenopathy  CV: regular rate and rhythm, normal S1 S2, no S3 or S4, no murmur, click or rub, no peripheral edema  ABDOMEN: soft, nontender, no hepatosplenomegaly, no masses and bowel sounds normal   (female) w/bimanual: normal female external genitalia, normal urethral meatus, normal vaginal mucosa, and normal cervix/adnexa/uterus without masses or discharge slightly tipped uterus anteriorly , no-Issues with rectocele or cystocele currently but prominent posterior floor vaginal tissue without expulsion.  MS: no gross musculoskeletal defects noted, no edema  SKIN: no suspicious lesions or rashes  NEURO: Normal strength and tone, mentation intact and speech normal  PSYCH: mentation appears normal, affect normal/bright        Signed Electronically by: EMMA Langley CNP

## 2024-05-27 LAB
HPV HR 12 DNA CVX QL NAA+PROBE: NEGATIVE
HPV16 DNA CVX QL NAA+PROBE: NEGATIVE
HPV18 DNA CVX QL NAA+PROBE: NEGATIVE
HUMAN PAPILLOMA VIRUS FINAL DIAGNOSIS: NORMAL

## 2024-05-30 LAB
BKR LAB AP GYN ADEQUACY: NORMAL
BKR LAB AP GYN INTERPRETATION: NORMAL
BKR LAB AP LMP: NORMAL
BKR LAB AP PREVIOUS ABNL DX: NORMAL
BKR LAB AP PREVIOUS ABNORMAL: NORMAL
PATH REPORT.COMMENTS IMP SPEC: NORMAL
PATH REPORT.COMMENTS IMP SPEC: NORMAL
PATH REPORT.RELEVANT HX SPEC: NORMAL

## 2024-07-17 ENCOUNTER — ONCOLOGY VISIT (OUTPATIENT)
Dept: ONCOLOGY | Facility: CLINIC | Age: 44
End: 2024-07-17
Attending: INTERNAL MEDICINE
Payer: COMMERCIAL

## 2024-07-17 ENCOUNTER — LAB (OUTPATIENT)
Dept: INFUSION THERAPY | Facility: CLINIC | Age: 44
End: 2024-07-17
Attending: INTERNAL MEDICINE
Payer: COMMERCIAL

## 2024-07-17 VITALS
OXYGEN SATURATION: 98 % | SYSTOLIC BLOOD PRESSURE: 103 MMHG | BODY MASS INDEX: 23.73 KG/M2 | HEART RATE: 52 BPM | HEIGHT: 67 IN | WEIGHT: 151.2 LBS | DIASTOLIC BLOOD PRESSURE: 65 MMHG

## 2024-07-17 DIAGNOSIS — D70.8 AUTOIMMUNE NEUTROPENIA (H): Primary | ICD-10-CM

## 2024-07-17 DIAGNOSIS — D70.8 AUTOIMMUNE NEUTROPENIA (H): ICD-10-CM

## 2024-07-17 LAB
BASOPHILS # BLD AUTO: 0 10E3/UL (ref 0–0.2)
BASOPHILS NFR BLD AUTO: 1 %
EOSINOPHIL # BLD AUTO: 0.1 10E3/UL (ref 0–0.7)
EOSINOPHIL NFR BLD AUTO: 3 %
ERYTHROCYTE [DISTWIDTH] IN BLOOD BY AUTOMATED COUNT: 12.2 % (ref 10–15)
HCT VFR BLD AUTO: 39.2 % (ref 35–47)
HGB BLD-MCNC: 13.1 G/DL (ref 11.7–15.7)
HOLD SPECIMEN: NORMAL
HOLD SPECIMEN: NORMAL
IMM GRANULOCYTES # BLD: 0 10E3/UL
IMM GRANULOCYTES NFR BLD: 0 %
LYMPHOCYTES # BLD AUTO: 1.2 10E3/UL (ref 0.8–5.3)
LYMPHOCYTES NFR BLD AUTO: 46 %
MCH RBC QN AUTO: 31.9 PG (ref 26.5–33)
MCHC RBC AUTO-ENTMCNC: 33.4 G/DL (ref 31.5–36.5)
MCV RBC AUTO: 95 FL (ref 78–100)
MONOCYTES # BLD AUTO: 0.2 10E3/UL (ref 0–1.3)
MONOCYTES NFR BLD AUTO: 7 %
NEUTROPHILS # BLD AUTO: 1.1 10E3/UL (ref 1.6–8.3)
NEUTROPHILS NFR BLD AUTO: 43 %
NRBC # BLD AUTO: 0 10E3/UL
NRBC BLD AUTO-RTO: 0 /100
PLATELET # BLD AUTO: 230 10E3/UL (ref 150–450)
RBC # BLD AUTO: 4.11 10E6/UL (ref 3.8–5.2)
WBC # BLD AUTO: 2.5 10E3/UL (ref 4–11)

## 2024-07-17 PROCEDURE — 36415 COLL VENOUS BLD VENIPUNCTURE: CPT

## 2024-07-17 PROCEDURE — 99214 OFFICE O/P EST MOD 30 MIN: CPT | Performed by: INTERNAL MEDICINE

## 2024-07-17 PROCEDURE — 85025 COMPLETE CBC W/AUTO DIFF WBC: CPT

## 2024-07-17 PROCEDURE — 99215 OFFICE O/P EST HI 40 MIN: CPT | Performed by: INTERNAL MEDICINE

## 2024-07-17 ASSESSMENT — PAIN SCALES - GENERAL: PAINLEVEL: NO PAIN (0)

## 2024-07-17 NOTE — PROGRESS NOTES
"[facility] Hematology / Oncology  Progress Note  Name: Alyssa Manzano  :  1980    MRN:  [id number]    --------------------    Assessment / Plan:  Autoimmune neutropenia, G-CSF responsive (complicated by bone pain).    Continue weekly G- mcg with additional G- mcg as needed for mouth sores, infections and prior to procedures.  ANC goal somewhere between 1 and 1.5; we could drive her ANC is higher but would require more G-CSF which causes greater degree of bone pain.  No signs of chronic smoldering infections at this time.  Planning seasonal flu shot and completing every 6-month COVID vaccinations; completed pneumonia series complicated by reaction.  Messaging sent to her primary care provider regarding shingles vaccination.  Alyssa knows to contact us for worsening infections or blood counts.  CBC 3/6/9 months; return to clinic 12 months with CBC.    Jake Parker MD    --------------------    Interval History:  Alyssa returns for follow up of autoimmune neutropenia.  All in all, she has been doing quite well.  She has been [place] the [place] and raising her 3 children around 2 some related sports and activities.  She has been using her G-CSF once per week occasionally with an extra dose here and there for mouth sores or fatigue.  Beyond that she has been doing quite well.  No plans for any upcoming surgeries or dental work.  No chronic related infections involving the sinuses or lungs.    Social History:  Social History     Tobacco Use    Smoking status: Never     Passive exposure: Never    Smokeless tobacco: Never   Vaping Use    Vaping status: Never Used   Substance Use Topics    Alcohol use: Not Currently     Comment: 4 drinks per week    Drug use: Yes     Types: Marijuana     Comment: RX-     --------------------    Physical Exam:  VS: /65 (BP Location: Right arm)   Pulse 52   Ht 1.71 m (5' 7.32\")   Wt 68.6 kg (151 lb 3.2 oz)   LMP  (LMP " Unknown)   SpO2 98%   BMI 23.46 kg/m  .  GEN: Well appearing.    Labs / Imaging:  Reviewed CBC.

## 2024-07-17 NOTE — LETTER
2024      Alyssa Manzano  [address] [facility] [address]      Dear Colleague,    Thank you for referring your patient, Alyssa Manzano, to the [facility] [place]. Please see a copy of my visit note below.    [facility] Hematology / Oncology  Progress Note  Name: Alyssa Manzano  :  1980    MRN:  [id number]    --------------------    Assessment / Plan:  Autoimmune neutropenia, G-CSF responsive (complicated by bone pain).    Continue weekly G- mcg with additional G- mcg as needed for mouth sores, infections and prior to procedures.  ANC goal somewhere between 1 and 1.5; we could drive her ANC is higher but would require more G-CSF which causes greater degree of bone pain.  No signs of chronic smoldering infections at this time.  Planning seasonal flu shot and completing every 6-month COVID vaccinations; completed pneumonia series complicated by reaction.  Messaging sent to her primary care provider regarding shingles vaccination.  Alyssa knows to contact us for worsening infections or blood counts.  CBC 3/6/9 months; return to clinic 12 months with CBC.    Jake Parker MD    --------------------    Interval History:  Alyssa returns for follow up of autoimmune neutropenia.  All in all, she has been doing quite well.  She has been [place] and raising her 3 children around 2 some related sports and activities.  She has been using her G-CSF once per week occasionally with an extra dose here and there for mouth sores or fatigue.  Beyond that she has been doing quite well.  No plans for any upcoming surgeries or dental work.  No chronic related infections involving the sinuses or lungs.    Social History:  Social History     Tobacco Use     Smoking status: Never     Passive exposure: Never     Smokeless tobacco: Never   Vaping Use     Vaping status: Never Used   Substance Use Topics     Alcohol use: Not  "Currently     Comment: 4 drinks per week     Drug use: Yes     Types: Marijuana     Comment: RX-     --------------------    Physical Exam:  VS: /65 (BP Location: Right arm)   Pulse 52   Ht 1.71 m (5' 7.32\")   Wt 68.6 kg (151 lb 3.2 oz)   LMP  (LMP Unknown)   SpO2 98%   BMI 23.46 kg/m  .  GEN: Well appearing.    Labs / Imaging:  Reviewed CBC.      Again, thank you for allowing me to participate in the care of your patient.        Sincerely,        Jake Parker MD  "

## 2024-07-17 NOTE — NURSING NOTE
"Oncology Rooming Note    July 17, 2024 2:51 PM   Alyssa Manzano is a 43 year old female who presents for:    Chief Complaint   Patient presents with    Oncology Clinic Visit     Initial Vitals: /65 (BP Location: Right arm)   Pulse 52   Ht 1.71 m (5' 7.32\")   Wt 68.6 kg (151 lb 3.2 oz)   LMP  (LMP Unknown)   SpO2 98%   BMI 23.46 kg/m   Estimated body mass index is 23.46 kg/m  as calculated from the following:    Height as of this encounter: 1.71 m (5' 7.32\").    Weight as of this encounter: 68.6 kg (151 lb 3.2 oz). Body surface area is 1.81 meters squared.  No Pain (0) Comment: Data Unavailable   No LMP recorded (lmp unknown). (Menstrual status: Birth Control).  Allergies reviewed: Yes  Medications reviewed: Yes    Medications: Medication refills not needed today.  Pharmacy name entered into EPIC:    [facility] - [place], [place] - [address] [facility] [id number] - [place], [place] - [address]  [facility] - [place], [place] - [address] [facility] - [place], [place] - [address]    Frailty Screening:   Is the patient here for a new oncology consult visit in cancer care? 2. No      Clinical concerns: No Concerns        Elmer Jennings MA            "

## 2024-08-09 DIAGNOSIS — D70.8 AUTOIMMUNE NEUTROPENIA (H): ICD-10-CM

## 2024-08-09 RX ORDER — FILGRASTIM-SNDZ 300 UG/.5ML
INJECTION, SOLUTION INTRAVENOUS; SUBCUTANEOUS
Qty: 2 ML | Refills: 11 | Status: SHIPPED | OUTPATIENT
Start: 2024-08-09

## 2024-08-09 NOTE — TELEPHONE ENCOUNTER
SUBJECTIVE/OBJECTIVE:                                                    Refill request receive for:  filgrastim-sndz (ZARXIO) 300 MCG/0.5ML syringe     Last refill: 8/4/2023, 2 mL w/11 refills  Date of LOV r/t Medication: 7/17/2024  Future appt scheduled? No - checkout appt request order not placed, message sent to scheduling team.    RECENT LABS/VITALS                                                      Recent Labs   Lab Test 05/23/24  1027 04/13/23  0947   CHOL 140 155   HDL 56 72   LDL 63 66   TRIG 105 85     Lab Results   Component Value Date    AST 11 11/06/2019     Lab Results   Component Value Date    ALT 18 11/06/2019     Lab Results   Component Value Date    A1C 4.9 05/23/2024    A1C 4.7 04/13/2023    A1C 4.3 11/06/2019     Creatinine   Date Value Ref Range Status   11/06/2019 0.62 0.52 - 1.04 mg/dL Final   ]  Potassium   Date Value Ref Range Status   11/06/2019 3.9 3.4 - 5.3 mmol/L Final   ]  TSH   Date Value Ref Range Status   05/23/2024 1.45 0.30 - 4.20 uIU/mL Final   04/13/2023 1.91 0.40 - 4.00 mU/L Final   12/13/2021 2.96 0.40 - 4.00 mU/L Final   11/06/2019 1.70 0.40 - 4.00 mU/L Final   01/09/2014 1.84 0.4 - 5.0 mU/L Final   05/24/2011 1.42 0.4 - 5.0 mU/L Final   09/29/2009 1.65 0.4 - 5.0 mU/L Final     BP Readings from Last 4 Encounters:   07/17/24 103/65   05/23/24 100/64   03/21/24 100/60   09/18/23 102/62       PLAN:                                                      Sent to provider to advise.      Flower Mcbride RN

## 2024-08-09 NOTE — PATIENT INSTRUCTIONS
If you have labs or imaging done, the results will automatically release in [employer] without an interpretation.  Your health care professional will review those results and send an interpretation with recommendations as soon as possible, but this may be 1-3 business days.    If you have any questions regarding your visit, please contact your care team.     [employer] [facility]: [phone]  [facility] CLINIC HOURS TELEPHONE NUMBER   DO. Flakita Hargrove -Surgery Scheduler  [place] -     ADRIEN Cormier RN Kylie, RN [place]    Monday 8:30 am-5:00 pm  Wednesday 8:30 am-5:00 pm  Friday 8:30 am-5:00 pm    Typical Surgery day: Tuesday [facility]  [address] [place] [address]  Phone:  [phone]  Fax: [phone]   Appointment [phone]    Imaging Scheduling-All Locations [phone]    [facility] Delivery  [address] [place] [address] [place] [address] [phone]   **Surgeries** Our Surgery Schedulers will contact you to schedule. If you do not receive a call within 3 business days, please call [phone].  Urgent Care locations:  [facility] Monday-Friday   10 am - 8 pm  Saturday and    9 am - 5 pm [phone]   If you need a medication refill, please contact your pharmacy. Please allow 3 business days for your refill to be completed.  As always, Thank you for trusting us with your healthcare needs!  see additional instructions from your care team below

## 2024-08-12 ENCOUNTER — OFFICE VISIT (OUTPATIENT)
Dept: OBGYN | Facility: CLINIC | Age: 44
End: 2024-08-12
Payer: COMMERCIAL

## 2024-08-12 VITALS — DIASTOLIC BLOOD PRESSURE: 69 MMHG | HEART RATE: 55 BPM | SYSTOLIC BLOOD PRESSURE: 120 MMHG | OXYGEN SATURATION: 100 %

## 2024-08-12 DIAGNOSIS — R10.2 PELVIC PAIN IN FEMALE: Primary | ICD-10-CM

## 2024-08-12 DIAGNOSIS — N89.8 VAGINAL DISCHARGE: ICD-10-CM

## 2024-08-12 LAB
CLUE CELLS: ABNORMAL
TRICHOMONAS, WET PREP: ABNORMAL
WBC'S/HIGH POWER FIELD, WET PREP: ABNORMAL
YEAST, WET PREP: ABNORMAL

## 2024-08-12 PROCEDURE — 87210 SMEAR WET MOUNT SALINE/INK: CPT | Performed by: OBSTETRICS & GYNECOLOGY

## 2024-08-12 PROCEDURE — 99214 OFFICE O/P EST MOD 30 MIN: CPT | Performed by: OBSTETRICS & GYNECOLOGY

## 2024-08-12 PROCEDURE — G2211 COMPLEX E/M VISIT ADD ON: HCPCS | Performed by: OBSTETRICS & GYNECOLOGY

## 2024-08-12 PROCEDURE — 99459 PELVIC EXAMINATION: CPT | Performed by: OBSTETRICS & GYNECOLOGY

## 2024-08-12 NOTE — PROGRESS NOTES
This 44 y/o female, , s/p tubal ligation, presents c/o acute onset of right-sided pelvic pain starting 6-8 months ago.  She describes it as constant although she has not felt this discomfort for the past week.  She is taking the BCP serially and also c/o episodes of post-coital spotting of bright red blood x 6 months as well.  She feels as though this abnormal scant blood appears about 80% of the time after intercourse so would like to know the cause.  She denies any new partner or known trigger other than coitus.  She had a vaginal delivery requiring a vacuum followed by 2 cesareans.  Her co-test was updated on 2024 and these results were normal.  She has a hx of an anterior diastasis and a right inguinal hernia repair.  Her last pelvic US on 2015 was interpreted as normal.  /69 (BP Location: Right arm, Patient Position: Chair, Cuff Size: Adult Regular)   Pulse 55   SpO2 100%   Breastfeeding No   ROS:  10 systems were reviewed and the positives were listed under problems.  In the lithotomy position, a bi-valve speculum was placed and her cervix and vaginal mucosa were inspected and found to appear normal.  There were no pelvic support defects noted.  A wet prep was obtained and submitted due to the presence of a small amount of white vaginal discharge but no blood was found.  Her pelvic exam was unremarkable.  Assessment - Right-sided pelvic pain, post-coital spotting, serial use of the BCP  Plan - Will order a pelvic US since her last one was done 9 years ago.  Her recent co-test results were normal on 2024.  All her questions and concerns were addressed.  30 minutes were spent today in chart review, the patient visit, review of tests, and documentation in regard to the issues noted above.

## 2024-08-19 ENCOUNTER — ANCILLARY PROCEDURE (OUTPATIENT)
Dept: ULTRASOUND IMAGING | Facility: CLINIC | Age: 44
End: 2024-08-19
Attending: OBSTETRICS & GYNECOLOGY
Payer: COMMERCIAL

## 2024-08-19 DIAGNOSIS — R10.2 PELVIC PAIN IN FEMALE: Primary | ICD-10-CM

## 2024-08-19 DIAGNOSIS — R10.2 PELVIC PAIN IN FEMALE: ICD-10-CM

## 2024-08-19 PROCEDURE — 76830 TRANSVAGINAL US NON-OB: CPT | Performed by: RADIOLOGY

## 2024-08-19 PROCEDURE — 76856 US EXAM PELVIC COMPLETE: CPT | Performed by: RADIOLOGY

## 2024-08-30 ENCOUNTER — ANCILLARY PROCEDURE (OUTPATIENT)
Dept: CT IMAGING | Facility: CLINIC | Age: 44
End: 2024-08-30
Attending: OBSTETRICS & GYNECOLOGY
Payer: COMMERCIAL

## 2024-08-30 DIAGNOSIS — R10.2 PELVIC PAIN IN FEMALE: ICD-10-CM

## 2024-08-30 PROCEDURE — 74176 CT ABD & PELVIS W/O CONTRAST: CPT | Performed by: RADIOLOGY

## 2024-09-18 ENCOUNTER — E-VISIT (OUTPATIENT)
Dept: FAMILY MEDICINE | Facility: CLINIC | Age: 44
End: 2024-09-18
Payer: COMMERCIAL

## 2024-09-18 ENCOUNTER — TELEPHONE (OUTPATIENT)
Dept: FAMILY MEDICINE | Facility: CLINIC | Age: 44
End: 2024-09-18

## 2024-09-18 DIAGNOSIS — R30.0 DYSURIA: Primary | ICD-10-CM

## 2024-09-18 PROCEDURE — 99421 OL DIG E/M SVC 5-10 MIN: CPT | Performed by: NURSE PRACTITIONER

## 2024-09-18 RX ORDER — SULFAMETHOXAZOLE/TRIMETHOPRIM 800-160 MG
1 TABLET ORAL 2 TIMES DAILY
Qty: 6 TABLET | Refills: 0 | Status: SHIPPED | OUTPATIENT
Start: 2024-09-18

## 2024-09-18 RX ORDER — PHENAZOPYRIDINE HYDROCHLORIDE 200 MG/1
200 TABLET, FILM COATED ORAL 3 TIMES DAILY PRN
Qty: 6 TABLET | Refills: 0 | Status: SHIPPED | OUTPATIENT
Start: 2024-09-18

## 2024-09-19 NOTE — PATIENT INSTRUCTIONS
Dear Alyssa Manzano    After reviewing your responses, I've been able to diagnose you with a urinary tract infection, which is a common infection of the bladder with bacteria.  This is not a sexually transmitted infection, though urinating immediately after intercourse can help prevent infections.  Drinking lots of fluids is also helpful to clear your current infection and prevent the next one.      I have sent a prescription for antibiotics to your pharmacy to treat this infection.- This was sent yesterday.     It is important that you take all of your prescribed medication even if your symptoms are improving after a few doses.  Taking all of your medicine helps prevent the symptoms from returning.     If your symptoms worsen, you develop pain in your back or stomach, develop fevers, or are not improving in 5 days, please contact your primary care provider for an appointment or visit any of our convenient Walk-in or Urgent Care Centers to be seen, which can be found on our website here.    If you are not better, we'll need a visit in person with urine testing.     Thanks again for choosing us as your health care partner,    EMMA Langley CNP  Urinary Tract Infection (UTI) in Women: Care Instructions  Overview     A urinary tract infection (UTI) is an infection caused by bacteria. It can happen anywhere in the urinary tract. A UTI can happen in the:  Kidneys.  Ureters, the tubes that connect the kidneys to the bladder.  Bladder.  Urethra, where the urine comes out.  Most UTIs are bladder infections. They often cause pain or burning when you urinate.  Most UTIs can be cured with antibiotics. If you are prescribed antibiotics, be sure to complete your treatment so that the infection does not get worse.  Follow-up care is a key part of your treatment and safety. Be sure to make and go to all appointments, and call your doctor if you are having problems. It's also a good idea to know your test results and keep a  list of the medicines you take.  How can you care for yourself at home?  Take your antibiotics as directed. Do not stop taking them just because you feel better. You need to take the full course of antibiotics.  Drink extra water and other fluids for the next day or two. This will help make the urine less concentrated and help wash out the bacteria that are causing the infection. (If you have kidney, heart, or liver disease and have to limit fluids, talk with your doctor before you increase the amount of fluids you drink.)  Avoid drinks that are carbonated or have caffeine. They can irritate the bladder.  Urinate often. Try to empty your bladder each time.  To relieve pain, take a hot bath or lay a heating pad set on low over your lower belly or genital area. Never go to sleep with a heating pad in place.  To prevent UTIs  Drink plenty of water each day. This helps you urinate often, which clears bacteria from your system. (If you have kidney, heart, or liver disease and have to limit fluids, talk with your doctor before you increase the amount of fluids you drink.)  Urinate when you need to.  If you are sexually active, urinate right after you have sex.  Change sanitary pads often.  Avoid douches, bubble baths, feminine hygiene sprays, and other feminine hygiene products that have deodorants.  After going to the bathroom, wipe from front to back.  When should you call for help?   Call your doctor now or seek immediate medical care if:    You have new or worse fever, chills, nausea, or vomiting.     You have new pain in your back just below your rib cage. This is called flank pain.     There is new blood or pus in your urine.     You have any problems with your antibiotic medicine.   Watch closely for changes in your health, and be sure to contact your doctor if:    You are not getting better after taking an antibiotic for 2 days.     Your symptoms go away but then come back.   Where can you learn more?  Go to  "https://www."[employer]net/patiented  Enter K848 in the search box to learn more about \"Urinary Tract Infection (UTI) in Women: Care Instructions.\"  Current as of: November 15, 2023               Content Version: 14.0    6710-5401 [employer].   Care instructions adapted under license by your healthcare professional. If you have questions about a medical condition or this instruction, always ask your healthcare professional. [employer], [employer] disclaims any warranty or liability for your use of this information.      "

## 2024-09-22 ENCOUNTER — TRANSFERRED RECORDS (OUTPATIENT)
Dept: HEALTH INFORMATION MANAGEMENT | Facility: CLINIC | Age: 44
End: 2024-09-22

## 2024-10-15 ENCOUNTER — LAB (OUTPATIENT)
Dept: LAB | Facility: CLINIC | Age: 44
End: 2024-10-15
Payer: COMMERCIAL

## 2024-10-15 DIAGNOSIS — D70.8 AUTOIMMUNE NEUTROPENIA (H): ICD-10-CM

## 2024-10-15 LAB
BASOPHILS # BLD AUTO: 0 10E3/UL (ref 0–0.2)
BASOPHILS NFR BLD AUTO: 0 %
EOSINOPHIL # BLD AUTO: 0 10E3/UL (ref 0–0.7)
EOSINOPHIL NFR BLD AUTO: 1 %
ERYTHROCYTE [DISTWIDTH] IN BLOOD BY AUTOMATED COUNT: 12 % (ref 10–15)
HCT VFR BLD AUTO: 38.1 % (ref 35–47)
HGB BLD-MCNC: 13 G/DL (ref 11.7–15.7)
IMM GRANULOCYTES # BLD: 0 10E3/UL
IMM GRANULOCYTES NFR BLD: 0 %
LYMPHOCYTES # BLD AUTO: 0.9 10E3/UL (ref 0.8–5.3)
LYMPHOCYTES NFR BLD AUTO: 30 %
MCH RBC QN AUTO: 32.6 PG (ref 26.5–33)
MCHC RBC AUTO-ENTMCNC: 34.1 G/DL (ref 31.5–36.5)
MCV RBC AUTO: 96 FL (ref 78–100)
MONOCYTES # BLD AUTO: 0.3 10E3/UL (ref 0–1.3)
MONOCYTES NFR BLD AUTO: 10 %
NEUTROPHILS # BLD AUTO: 1.7 10E3/UL (ref 1.6–8.3)
NEUTROPHILS NFR BLD AUTO: 59 %
PLATELET # BLD AUTO: 209 10E3/UL (ref 150–450)
RBC # BLD AUTO: 3.99 10E6/UL (ref 3.8–5.2)
WBC # BLD AUTO: 2.9 10E3/UL (ref 4–11)

## 2024-10-15 PROCEDURE — 85025 COMPLETE CBC W/AUTO DIFF WBC: CPT

## 2024-10-15 PROCEDURE — 36415 COLL VENOUS BLD VENIPUNCTURE: CPT

## 2024-10-16 ENCOUNTER — NURSE TRIAGE (OUTPATIENT)
Dept: FAMILY MEDICINE | Facility: CLINIC | Age: 44
End: 2024-10-16
Payer: COMMERCIAL

## 2024-10-16 NOTE — TELEPHONE ENCOUNTER
Patient experiencing chest tightness x24 hrs. Onset 3pm yesterday.  States is across her chest at nipple line; states both chests but left more so. Feels like she's wearing a bra that's too tight.  Denies any stabbing or severe pain. Doesn't feel like there's something sitting on chest.  States is constant.  Nothing makes it worse or better. Present x 24 hrs; not letting up.  No radiation.  Does wake her up/can't sleep due to the discomfort.  No GERD; no injury/blow to chest.  States did work out Monday lifting weights with arms/walking on treadmill.  Hadn't done exercise like that for awhile.  States feels like can't get a full breath in at times.  Was just in meeting and became flushed and palms sweaty.  She states no cardiac history.  Had thought may have had asthma few years ago but states that was ruled out.  No history of PE/DVT.  Is on birth control; states has been on it for 25-30 years.  Smoked marijuana daily up til 3 days ago; stopped because felt was having palpitations. No cocaine or other street drug use.  She states no palpitations since stopped marijuana.  Current HR is 74.  No BP monitor.  Has pulse oximeter reading is 99.  Denies any other sx of illness.   Reason for Disposition   Difficulty breathing    Additional Information   Negative: SEVERE chest pain   Negative: Pain also in shoulder(s) or arm(s) or jaw    Protocols used: Chest Pain-A-OH

## 2024-10-22 ENCOUNTER — VIRTUAL VISIT (OUTPATIENT)
Dept: FAMILY MEDICINE | Facility: CLINIC | Age: 44
End: 2024-10-22
Payer: COMMERCIAL

## 2024-10-22 DIAGNOSIS — D49.2 ABNORMAL GROWTH OF CLAVICLE: ICD-10-CM

## 2024-10-22 DIAGNOSIS — Q79.9 BONY ABNORMALITY: ICD-10-CM

## 2024-10-22 DIAGNOSIS — F90.2 ATTENTION DEFICIT HYPERACTIVITY DISORDER (ADHD), COMBINED TYPE: Primary | ICD-10-CM

## 2024-10-22 PROCEDURE — 99214 OFFICE O/P EST MOD 30 MIN: CPT | Mod: 95 | Performed by: NURSE PRACTITIONER

## 2024-10-22 PROCEDURE — G2211 COMPLEX E/M VISIT ADD ON: HCPCS | Mod: 95 | Performed by: NURSE PRACTITIONER

## 2024-10-22 RX ORDER — LISDEXAMFETAMINE DIMESYLATE 10 MG/1
10 CAPSULE ORAL EVERY MORNING
Qty: 30 CAPSULE | Refills: 0 | Status: SHIPPED | OUTPATIENT
Start: 2024-10-22 | End: 2024-10-25

## 2024-10-22 ASSESSMENT — ASTHMA QUESTIONNAIRES
QUESTION_3 LAST FOUR WEEKS HOW OFTEN DID YOUR ASTHMA SYMPTOMS (WHEEZING, COUGHING, SHORTNESS OF BREATH, CHEST TIGHTNESS OR PAIN) WAKE YOU UP AT NIGHT OR EARLIER THAN USUAL IN THE MORNING: NOT AT ALL
ACT_TOTALSCORE: 25
ACT_TOTALSCORE: 25
QUESTION_1 LAST FOUR WEEKS HOW MUCH OF THE TIME DID YOUR ASTHMA KEEP YOU FROM GETTING AS MUCH DONE AT WORK, SCHOOL OR AT HOME: NONE OF THE TIME
QUESTION_5 LAST FOUR WEEKS HOW WOULD YOU RATE YOUR ASTHMA CONTROL: COMPLETELY CONTROLLED
QUESTION_4 LAST FOUR WEEKS HOW OFTEN HAVE YOU USED YOUR RESCUE INHALER OR NEBULIZER MEDICATION (SUCH AS ALBUTEROL): NOT AT ALL
QUESTION_2 LAST FOUR WEEKS HOW OFTEN HAVE YOU HAD SHORTNESS OF BREATH: NOT AT ALL

## 2024-10-22 ASSESSMENT — PATIENT HEALTH QUESTIONNAIRE - PHQ9
SUM OF ALL RESPONSES TO PHQ QUESTIONS 1-9: 3
10. IF YOU CHECKED OFF ANY PROBLEMS, HOW DIFFICULT HAVE THESE PROBLEMS MADE IT FOR YOU TO DO YOUR WORK, TAKE CARE OF THINGS AT HOME, OR GET ALONG WITH OTHER PEOPLE: NOT DIFFICULT AT ALL
SUM OF ALL RESPONSES TO PHQ QUESTIONS 1-9: 3

## 2024-10-22 NOTE — PROGRESS NOTES
Alyssa is a 43 year old who is being evaluated via a billable video visit.    How would you like to obtain your AVS? MyChart  If the video visit is dropped, the invitation should be resent by: Text to cell phone: [phone]  Will anyone else be joining your video visit? No      Assessment & Plan     Attention deficit hyperactivity disorder (ADHD), combined type  New diag.   Getting ZARINA.   Starting Vyvanse-Medication use, risk, and anticipated side effects discussed.   F2f visit in 1 month with UDS, will sign CSA then as well.   - lisdexamfetamine (VYVANSE) 10 MG capsule; Take 1 capsule (10 mg) by mouth every morning.    Bony abnormality  Seems benign on report- advise ortho follow-up for definitive mgmt. Referral placed.   - Orthopedic  Referral; Future    Abnormal growth of clavicle  As above.   - Orthopedic  Referral; Future        MED REC REQUIRED  Post Medication Reconciliation Status: discharge medications reconciled and changed, per note/orders    MEDICATIONS:        - Continue other medications without change    The longitudinal plan of care for the diagnosis(es)/condition(s) as documented were addressed during this visit. Due to the added complexity in care, I will continue to support Alyssa in the subsequent management and with ongoing continuity of care.    Subjective   Alyssa is a 43 year old, presenting for the following health issues:  No chief complaint on file.        5/23/2024     9:18 AM   Additional Questions   Roomed by Brandi Erickson CMA   Accompanied by self     HPI     ED/UC Followup:    Facility:  [facility]  Date of visit: 10/16  Reason for visit: Dyspnea  Current Status: Patient states she feels good. No longer has shortness of breath or chest tightness.- likely anxiety component- work-up is negative. Normal exercise tolerance.     ADHD diagnosis. Assessment in Sept- no old records.   Comorbid anxiety. Noted with ED visit and in the past somewhat.   Felt  kina affect on selective serotonin reuptake inhibitor. Wellbutrin helped with depression, but low libido.   Self medicates for ADHD with THC. But stopping due to palpitations with use.  Vyvanse was recommended.   No alcohol use in 3 months- one drink was giving her bad HA's in the am.   Not having daily anxiety - feels more situational- I.e. Father's passing.     Does procrastinate but accomodates for this and schedules time for procrastination and does decide not to push off tasks.   Aakash her  has this and they work together.     Reviewed CT from ED-   Right medical clavicle sclerotic lesion 11 mm- thought to be benigh.   Lung nodule- 3 mm, remarked as stable.     No no HX of CHF or DM- error in chart.             Review of Systems  Constitutional, HEENT, cardiovascular, pulmonary, gi and gu systems are negative, except as otherwise noted.      Objective           Vitals:  No vitals were obtained today due to virtual visit.    Physical Exam   GENERAL: alert and no distress  EYES: Eyes grossly normal to inspection.  No discharge or erythema, or obvious scleral/conjunctival abnormalities.  RESP: No audible wheeze, cough, or visible cyanosis.    SKIN: Visible skin clear. No significant rash, abnormal pigmentation or lesions.  NEURO: Cranial nerves grossly intact.  Mentation and speech appropriate for age.  PSYCH: Appropriate affect, tone, and pace of words    CT Chest Pulmonary Embolism w Contrast    Result Date: 10/16/2024  EXAM:  CT CHEST PULMONARY ANGIO DATE:  10/16/2024 16:32 CLINICAL DATA: ADDITIONAL CLINICAL DATA:  Shortness of Breath COMPARISON: CT chest 3/10/2015 TECHNIQUE:  A CT angiogram (CTA) of the pulmonary arteries and chest was performed.  Specifically, preliminary unenhanced images were obtained through the pulmonary arteries.  Following this, during bolus infusion of intravenous contrast, thin-section contiguous transaxial images were obtained through the thorax.  In addition, multiplanar and  three dimensional (3D) reformations through the pulmonary arterial tree were generated from the acquisition scanner and reviewed. CONTRAST:  IOHEXOL 350 MGI/ML    Dose Given: 80 mL intravenous injection. FINDINGS: MEDIASTINUM/MAKEDA/AXILLA:   The heart size is within normal limits. Normal position of interventricular septum. No pericardial effusion. There is no reflux of contrast to the hepatic veins. There are no enlarged thoracic lymph nodes. The thoracic aorta appears intact and is normal caliber. PULMONARY ARTERIES:  No CT evidence of pulmonary emboli.   LUNGS: The central tracheobronchial tree is patent. Thin-walled pulmonary cyst in the right upper lobe. No pulmonary consolidation, pleural effusion, or pneumothorax. 3 mm solid pulmonary nodule along the pleura in the left lower lobe, which is stable and benign. 3 mm solid pulmonary nodule in the left lower lobe (series 7 image 88) which is stable and benign. UPPER ABDOMEN: Evaluation of the upper abdomen is limited. BONES:  No significant findings in the visualized bones. There is an 11 mm sclerotic lesion in the medial right clavicle, which is new since the prior exam. OTHER:    IMPRESSION:   1.  No pulmonary embolism. 2.  Stable benign 3 mm solid pulmonary nodules. 3.  New 11 mm sclerotic lesion in the medial right clavicle in comparison to 3/10/2015. In the absence of known malignancy, this is most likely a benign bone island. REPORT SIGNED BY DR. James Kelly       Video-Visit Details    Type of service:  Video Visit   Originating Location (pt. Location): Home    Distant Location (provider location):  Off-site  Platform used for Video Visit: Simon  Signed Electronically by: EMMA Langley CNP    Answers submitted by the patient for this visit:  Patient Health Questionnaire (Submitted on 10/22/2024)  If you checked off any problems, how difficult have these problems made it for you to do your work, take care of things at home, or get along with other  people?: Not difficult at all  PHQ9 TOTAL SCORE: 3

## 2024-10-25 ENCOUNTER — MYC REFILL (OUTPATIENT)
Dept: FAMILY MEDICINE | Facility: CLINIC | Age: 44
End: 2024-10-25
Payer: COMMERCIAL

## 2024-10-25 DIAGNOSIS — F90.2 ATTENTION DEFICIT HYPERACTIVITY DISORDER (ADHD), COMBINED TYPE: ICD-10-CM

## 2024-10-25 NOTE — TELEPHONE ENCOUNTER
Action October 28, 2024 9:52 AM MT   Action Taken Called [facility] for imaging to be pushed STAT.  Sent a request for imaging from [facility].       DIAGNOSIS:   Bony abnormality [Q79.9]  Abnormal growth of clavicle [D49.2]   APPOINTMENT DATE: 10/29/2024   NOTES STATUS DETAILS   OFFICE NOTE from referring provider Internal 10/22/2024 - Tawana Handley APRN [facility] - [facility] Family Medicine   OFFICE NOTE from other specialist Internal 07/17/2024 - Jake Parker MD - [facility] Hematology & Oncology   MRI PACS [facility]:  10/25/2023 - Cervical Spine   CT SCAN PACS [facility]:  10/16/2024 - Chest     XRAYS (IMAGES & REPORTS) PACS [facility]:  06/14/2017 - Chest

## 2024-10-28 RX ORDER — LISDEXAMFETAMINE DIMESYLATE 10 MG/1
10 CAPSULE ORAL EVERY MORNING
Qty: 30 CAPSULE | Refills: 0 | Status: SHIPPED | OUTPATIENT
Start: 2024-10-28

## 2024-10-29 ENCOUNTER — PRE VISIT (OUTPATIENT)
Dept: ORTHOPEDICS | Facility: CLINIC | Age: 44
End: 2024-10-29

## 2024-10-29 ENCOUNTER — VIRTUAL VISIT (OUTPATIENT)
Dept: ORTHOPEDICS | Facility: CLINIC | Age: 44
End: 2024-10-29
Payer: COMMERCIAL

## 2024-10-29 VITALS — HEIGHT: 68 IN | BODY MASS INDEX: 22.73 KG/M2 | WEIGHT: 150 LBS

## 2024-10-29 DIAGNOSIS — M89.8X9 BONE ISLAND: Primary | ICD-10-CM

## 2024-10-29 PROCEDURE — 99203 OFFICE O/P NEW LOW 30 MIN: CPT | Mod: 95 | Performed by: ORTHOPAEDIC SURGERY

## 2024-10-29 ASSESSMENT — PAIN SCALES - GENERAL: PAINLEVEL_OUTOF10: NO PAIN (0)

## 2024-10-29 NOTE — LETTER
10/29/2024      Alyssa Manzano  [address] [place] [facility] [address]      Dear Colleague,    Thank you for referring your patient, Alyssa Manzano, to the [facility] [place]. Please see a copy of my visit note below.    Virtual Visit Details    Type of service:  Video Visit     Impression: Incidental finding of an asymptomatic bone lesion in the right medial clavicle most likely a bone island.    Plan: 1.  Bone scan entire skeleton with an additional focus of the right clavicle.  2.  Virtual visit to review the bone scan results.  3.  Follow-up x-rays of the of the clavicles in 1 year.    Patient is a 43-year-old female who is seen as a new patient.  She reports that while undergoing a chest CT for an totally different reason there was a finding of a sclerotic lesion in the right medial clavicle.  We are asked to evaluate her for that today.    Her medications and past medical history are listed in the electronic health record.  She has a series of past medical problems which are listed but at this time do not seem to be related to the radiographic finding in her right medial clavicle.    She denies a history of breast cancer.  She reports she is up-to-date on her mammograms and in fact is scheduled for 1 next week.    Review of her x-rays dating back to 2010 did not show the right clavicle or lesion until her most recent imaging.  This means that the lesion has appeared in the last 7 years.  Specific details of the chest CT which showed the right clavicle lesion show sclerotic lesion just measuring over 1 cm and is confined to the right medial clavicle there is no soft tissue mass there is no encroachment on the cortex.    I reviewed these findings with Alyssa.  I reassured her that I see no strong evidence for malignancy and I offered her several options as next step.  These include observation with follow-up x-ray in 1 year, surgical biopsy which at this time I would not  suggest and performing a bone scan of the entire skeleton and if this does not show significant activity in the right clavicle follow-up in 1 year with a chest x-ray.    Patient understands these options she would like to proceed with the bone scan supporting us to perform a biopsy a follow-up chest x-ray in a year.    This is a virtual encounter patient visit began at 3:23 PM 40 p.m.  The total length of the visit was 17 minutes      Again, thank you for allowing me to participate in the care of your patient.        Sincerely,        Channing Chambers MD

## 2024-10-29 NOTE — PROGRESS NOTES
Virtual Visit Details    Type of service:  Video Visit     Impression: Incidental finding of an asymptomatic bone lesion in the right medial clavicle most likely a bone island.    Plan: 1.  Bone scan entire skeleton with an additional focus of the right clavicle.  2.  Virtual visit to review the bone scan results.  3.  Follow-up x-rays of the of the clavicles in 1 year.    Patient is a 43-year-old female who is seen as a new patient.  She reports that while undergoing a chest CT for an totally different reason there was a finding of a sclerotic lesion in the right medial clavicle.  We are asked to evaluate her for that today.    Her medications and past medical history are listed in the electronic health record.  She has a series of past medical problems which are listed but at this time do not seem to be related to the radiographic finding in her right medial clavicle.    She denies a history of breast cancer.  She reports she is up-to-date on her mammograms and in fact is scheduled for 1 next week.    Review of her x-rays dating back to 2010 did not show the right clavicle or lesion until her most recent imaging.  This means that the lesion has appeared in the last 7 years.  Specific details of the chest CT which showed the right clavicle lesion show sclerotic lesion just measuring over 1 cm and is confined to the right medial clavicle there is no soft tissue mass there is no encroachment on the cortex.    I reviewed these findings with Alyssa.  I reassured her that I see no strong evidence for malignancy and I offered her several options as next step.  These include observation with follow-up x-ray in 1 year, surgical biopsy which at this time I would not suggest and performing a bone scan of the entire skeleton and if this does not show significant activity in the right clavicle follow-up in 1 year with a chest x-ray.    Patient understands these options she would like to proceed with the bone scan supporting  us to perform a biopsy a follow-up chest x-ray in a year.    This is a virtual encounter patient visit began at 3:23 PM 40 p.m.  The total length of the visit was 17 minutes

## 2024-10-29 NOTE — PATIENT INSTRUCTIONS
Impression: Incidental finding of an asymptomatic bone lesion in the right medial clavicle most likely a bone island.    Plan: 1.  Bone scan entire skeleton with an additional focus of the right clavicle.  2.  Virtual visit to review the bone scan results.  3.  Follow-up x-rays of the of the clavicles in 1 year.

## 2024-10-29 NOTE — NURSING NOTE
Current patient location: [address] [place] [id number]    Is the patient currently in the state of [place]? YES    Visit mode:VIDEO    If the visit is dropped, the patient can be reconnected by: VIDEO VISIT: Text to cell phone:   Telephone Information:   Mobile [phone]       Will anyone else be joining the visit? NO  (If patient encounters technical issues they should call [phone])    Are changes needed to the allergy or medication list? No    Are refills needed on medications prescribed by this physician? NO    Rooming Documentation:  Questionnaire(s) completed    Reason for visit: Consult    Memo BRANDON

## 2024-10-31 ENCOUNTER — TELEPHONE (OUTPATIENT)
Dept: ORTHOPEDICS | Facility: CLINIC | Age: 44
End: 2024-10-31
Payer: COMMERCIAL

## 2024-10-31 NOTE — TELEPHONE ENCOUNTER
Attempted to contact patient, left message to return my call.      PLAN: schedule patient for virtual visit with  to review bone scan on 11/12/24

## 2024-11-05 ENCOUNTER — ANCILLARY PROCEDURE (OUTPATIENT)
Dept: NUCLEAR MEDICINE | Facility: CLINIC | Age: 44
End: 2024-11-05
Attending: ORTHOPAEDIC SURGERY
Payer: COMMERCIAL

## 2024-11-05 DIAGNOSIS — M89.8X9 BONE ISLAND: ICD-10-CM

## 2024-11-05 PROCEDURE — A9503 TC99M MEDRONATE: HCPCS | Performed by: RADIOLOGY

## 2024-11-05 PROCEDURE — 78306 BONE IMAGING WHOLE BODY: CPT | Mod: GC | Performed by: RADIOLOGY

## 2024-11-05 RX ORDER — TC 99M MEDRONATE 20 MG/10ML
20-30 INJECTION, POWDER, LYOPHILIZED, FOR SOLUTION INTRAVENOUS ONCE
Status: COMPLETED | OUTPATIENT
Start: 2024-11-05 | End: 2024-11-05

## 2024-11-05 RX ADMIN — TC 99M MEDRONATE 25.4 MILLICURIE: 20 INJECTION, POWDER, LYOPHILIZED, FOR SOLUTION INTRAVENOUS at 10:55

## 2024-11-12 ENCOUNTER — VIRTUAL VISIT (OUTPATIENT)
Dept: ORTHOPEDICS | Facility: CLINIC | Age: 44
End: 2024-11-12
Attending: ORTHOPAEDIC SURGERY
Payer: COMMERCIAL

## 2024-11-12 VITALS — BODY MASS INDEX: 23.49 KG/M2 | HEIGHT: 68 IN | WEIGHT: 155 LBS

## 2024-11-12 DIAGNOSIS — M89.8X9 BONE ISLAND: Primary | ICD-10-CM

## 2024-11-12 PROCEDURE — 99212 OFFICE O/P EST SF 10 MIN: CPT | Mod: 95 | Performed by: ORTHOPAEDIC SURGERY

## 2024-11-12 ASSESSMENT — PAIN SCALES - GENERAL: PAINLEVEL_OUTOF10: NO PAIN (0)

## 2024-11-12 NOTE — LETTER
11/12/2024      Alyssa Manzano  [address] [facility] [address]      Dear Colleague,    Thank you for referring your patient, Alyssa Manzano, to the [facility] [place]. Please see a copy of my visit note below.    Virtual Visit Details    Type of service:  Video Visit     Impression: Incidental finding of an asymptomatic bone lesion in the right medial clavicle most likely a bone island. Bone scan of the body negative     Plan: Follow up PRN    Patient is seen in follow-up to review her bone scan.  She continues to port she has no discomfort in the region of the right medial clavicle.  She is somewhat anxious about the results.    I reviewed the bone scan as well as the radiologist report of the bone scan.  There is no abnormal activity anywhere in her skeleton including the right medial clavicle.    I reviewed reviewed with Alyssa that this of course is good news.  He appreciated that information.  All her questions were answered I recommended with a negative bone scan that should be followed up only on an as-needed basis.    This virtual visit began at 2:15 PM and ended at 2:25 PM.  The total length of this visit was 10 minutes.      Again, thank you for allowing me to participate in the care of your patient.        Sincerely,        Channing Chambers MD

## 2024-11-12 NOTE — PROGRESS NOTES
Virtual Visit Details    Type of service:  Video Visit     Impression: Incidental finding of an asymptomatic bone lesion in the right medial clavicle most likely a bone island. Bone scan of the body negative     Plan: Follow up PRN    Patient is seen in follow-up to review her bone scan.  She continues to port she has no discomfort in the region of the right medial clavicle.  She is somewhat anxious about the results.    I reviewed the bone scan as well as the radiologist report of the bone scan.  There is no abnormal activity anywhere in her skeleton including the right medial clavicle.    I reviewed reviewed with Alyssa that this of course is good news.  He appreciated that information.  All her questions were answered I recommended with a negative bone scan that should be followed up only on an as-needed basis.    This virtual visit began at 2:15 PM and ended at 2:25 PM.  The total length of this visit was 10 minutes.

## 2024-11-12 NOTE — NURSING NOTE
Current patient location: [address] [place] [id number]    Is the patient currently in the state of [place]? YES    Visit mode:VIDEO    If the visit is dropped, the patient can be reconnected by:VIDEO VISIT: Text to cell phone:   Telephone Information:   Mobile [phone]       Will anyone else be joining the visit? NO  (If patient encounters technical issues they should call [phone]    Are changes needed to the allergy or medication list? No    Are refills needed on medications prescribed by this physician? NO    Rooming Documentation:  Questionnaire(s) completed    Reason for visit: JONEL BRANDON

## 2024-11-12 NOTE — PATIENT INSTRUCTIONS
Impression: Incidental finding of an asymptomatic bone lesion in the right medial clavicle most likely a bone island. Bone scan of the body negative     Plan: Follow up PRN

## 2024-12-02 ENCOUNTER — ANCILLARY PROCEDURE (OUTPATIENT)
Dept: MAMMOGRAPHY | Facility: CLINIC | Age: 44
End: 2024-12-02
Attending: NURSE PRACTITIONER
Payer: COMMERCIAL

## 2024-12-02 DIAGNOSIS — Z12.31 ENCOUNTER FOR SCREENING MAMMOGRAM FOR BREAST CANCER: ICD-10-CM

## 2024-12-02 PROCEDURE — 77063 BREAST TOMOSYNTHESIS BI: CPT | Performed by: RADIOLOGY

## 2024-12-02 PROCEDURE — 77067 SCR MAMMO BI INCL CAD: CPT | Performed by: RADIOLOGY

## 2024-12-10 ENCOUNTER — TELEPHONE (OUTPATIENT)
Dept: FAMILY MEDICINE | Facility: CLINIC | Age: 44
End: 2024-12-10

## 2024-12-10 ENCOUNTER — VIRTUAL VISIT (OUTPATIENT)
Dept: FAMILY MEDICINE | Facility: CLINIC | Age: 44
End: 2024-12-10
Payer: COMMERCIAL

## 2024-12-10 DIAGNOSIS — F90.2 ATTENTION DEFICIT HYPERACTIVITY DISORDER (ADHD), COMBINED TYPE: Primary | ICD-10-CM

## 2024-12-10 PROCEDURE — G2211 COMPLEX E/M VISIT ADD ON: HCPCS | Mod: 95 | Performed by: NURSE PRACTITIONER

## 2024-12-10 PROCEDURE — 99214 OFFICE O/P EST MOD 30 MIN: CPT | Mod: 95 | Performed by: NURSE PRACTITIONER

## 2024-12-10 RX ORDER — LISDEXAMFETAMINE DIMESYLATE 20 MG/1
20 CAPSULE ORAL DAILY
Qty: 30 CAPSULE | Refills: 0 | Status: SHIPPED | OUTPATIENT
Start: 2025-02-08 | End: 2025-03-10

## 2024-12-10 RX ORDER — LISDEXAMFETAMINE DIMESYLATE 20 MG/1
20 CAPSULE ORAL DAILY
Qty: 30 CAPSULE | Refills: 0 | Status: SHIPPED | OUTPATIENT
Start: 2024-12-10 | End: 2024-12-10 | Stop reason: DRUGHIGH

## 2024-12-10 RX ORDER — LISDEXAMFETAMINE DIMESYLATE 20 MG/1
20 CAPSULE ORAL DAILY
Qty: 30 CAPSULE | Refills: 0 | Status: SHIPPED | OUTPATIENT
Start: 2025-01-09 | End: 2025-02-08

## 2024-12-10 NOTE — PROGRESS NOTES
Alyssa is a 43 year old who is being evaluated via a billable video visit.    How would you like to obtain your AVS? MyChart  If the video visit is dropped, the invitation should be resent by: Send to e-mail at: [email or web address]  Will anyone else be joining your video visit? No      Assessment & Plan     Attention deficit hyperactivity disorder (ADHD), combined type  Improving, dose increased.   Monitor appetite, and SA.   UDS ordered.   Needs CSA- advise f2f in 1 month to check dose and BP  3 months given, needs visit prior to next fill.     - Urine Drug Screen Clinic; Future  - lisdexamfetamine (VYVANSE) 20 MG capsule; Take 1 capsule (20 mg) by mouth daily.  - lisdexamfetamine (VYVANSE) 20 MG capsule; Take 1 capsule (20 mg) by mouth daily.  - lisdexamfetamine (VYVANSE) 20 MG capsule; Take 1 capsule (20 mg) by mouth daily.        The longitudinal plan of care for the diagnosis(es)/condition(s) as documented were addressed during this visit. Due to the added complexity in care, I will continue to support Alyssa in the subsequent management and with ongoing continuity of care.    MEDICATIONS:        - Continue other medications without change    Subjective   Alyssa is a 43 year old, presenting for the following health issues:  Recheck Medication and A.D.H.D        12/10/2024    10:03 AM   Additional Questions   Roomed by AD     [email or web address]    History of Present Illness       Reason for visit:  ADHD medication follow up.    She eats 2-3 servings of fruits and vegetables daily.She consumes 1 sweetened beverage(s) daily.She exercises with enough effort to increase her heart rate 10 to 19 minutes per day.  She exercises with enough effort to increase her heart rate 3 or less days per week. She is missing 1 dose(s) of medications per week.  She is not taking prescribed medications regularly due to remembering to take.       Medication Followup of Vyvanse  Taking Medication as prescribed: yes  Side Effects:   None  Medication Helping Symptoms:  Thinks that it is close to doing what it is supposed to but not sure if it is strong enough yet  First couple days, was more motivated to do things.     Starting to complete tasks at home. Not using on weekends.   Work- was better at first.     Denies SA, sleep is good. No tics.   Can feel it start working.   Does state she can tell her appetite is low.       Feels next dose increase would be where she lands for herself.                 Review of Systems  Constitutional, HEENT, cardiovascular, pulmonary, gi and gu systems are negative, except as otherwise noted.      Objective           Vitals:  No vitals were obtained today due to virtual visit.    Physical Exam   GENERAL: alert and no distress  EYES: Eyes grossly normal to inspection.  No discharge or erythema, or obvious scleral/conjunctival abnormalities.  RESP: No audible wheeze, cough, or visible cyanosis.    SKIN: Visible skin clear. No significant rash, abnormal pigmentation or lesions.  NEURO: Cranial nerves grossly intact.  Mentation and speech appropriate for age.  PSYCH: Appropriate affect, tone, and pace of words    Labs pending.       Video-Visit Details    Type of service:  Video Visit   Originating Location (pt. Location): Home    Distant Location (provider location):  Off-site  Platform used for Video Visit: Simon  Signed Electronically by: EMMA Langley CNP

## 2024-12-10 NOTE — TELEPHONE ENCOUNTER
Scheduled.    Future Appointments 12/10/2024 - 6/8/2025        Date Visit Type Length Department Provider     1/13/2025  9:00 AM LAB 15 min RG LABORATORY RG LAB              1/13/2025  9:30 AM OFFICE VISIT 30 min [facility]ILY [facility]ACT[facility] Tawana Handley, EMMA FOSTER    Location Instructions:     [facility] is located at [address] [facility], one mile north of the [address] [facility] exit off of [address] . From [address] [facility], exit to turn west on [address] [place] [address], then turn south on [address] [address] [facility].               1/23/2025 10:45 AM NEW DERM AUTOIMMUNE 15 min [facility] DERMATOLOGY Jacky Tse MD    Location Instructions:     [address] [place] [id number]              3/17/2025 10:00 AM ADHD NEW  60 min [facility] Nimco Carter, PhD    Location Instructions:     [facility] has 2 buildings on its campus. Please visit us at [address] [place] [address] Chris [place] and enter the building with the numbers [id number] on it.               3/24/2025  9:00 AM ADHD RETURN 60 min [facility] Nimco Carter, PhD    Location Instructions:     [facility] has 2 buildings on its campus. Please visit us at [address] [place] [address] Chris [place] and enter the building with the numbers [id number] on it.               4/14/2025  9:00 AM LAB 15 min RG LABORATORY RG LAB

## 2025-01-13 ENCOUNTER — OFFICE VISIT (OUTPATIENT)
Dept: FAMILY MEDICINE | Facility: CLINIC | Age: 45
End: 2025-01-13
Payer: COMMERCIAL

## 2025-01-13 ENCOUNTER — LAB (OUTPATIENT)
Dept: LAB | Facility: CLINIC | Age: 45
End: 2025-01-13
Payer: COMMERCIAL

## 2025-01-13 VITALS
DIASTOLIC BLOOD PRESSURE: 74 MMHG | BODY MASS INDEX: 25.07 KG/M2 | WEIGHT: 159.7 LBS | HEART RATE: 84 BPM | TEMPERATURE: 98 F | HEIGHT: 67 IN | OXYGEN SATURATION: 100 % | RESPIRATION RATE: 12 BRPM | SYSTOLIC BLOOD PRESSURE: 120 MMHG

## 2025-01-13 DIAGNOSIS — R76.8 ELEVATED RHEUMATOID FACTOR: ICD-10-CM

## 2025-01-13 DIAGNOSIS — F90.2 ATTENTION DEFICIT HYPERACTIVITY DISORDER (ADHD), COMBINED TYPE: ICD-10-CM

## 2025-01-13 DIAGNOSIS — M54.2 NECK PAIN: ICD-10-CM

## 2025-01-13 DIAGNOSIS — G43.919 INTRACTABLE MIGRAINE WITHOUT STATUS MIGRAINOSUS, UNSPECIFIED MIGRAINE TYPE: Primary | ICD-10-CM

## 2025-01-13 LAB
AMPHETAMINES UR QL: NOT DETECTED
BARBITURATES UR QL SCN: NOT DETECTED
BENZODIAZ UR QL SCN: NOT DETECTED
BUPRENORPHINE UR QL: NOT DETECTED
CANNABINOIDS UR QL: DETECTED
COCAINE UR QL SCN: NOT DETECTED
D-METHAMPHET UR QL: NOT DETECTED
METHADONE UR QL SCN: NOT DETECTED
OPIATES UR QL SCN: NOT DETECTED
OXYCODONE UR QL SCN: NOT DETECTED
PCP UR QL SCN: NOT DETECTED
TRICYCLICS UR QL SCN: NOT DETECTED

## 2025-01-13 PROCEDURE — G2211 COMPLEX E/M VISIT ADD ON: HCPCS | Performed by: NURSE PRACTITIONER

## 2025-01-13 PROCEDURE — 90471 IMMUNIZATION ADMIN: CPT | Performed by: NURSE PRACTITIONER

## 2025-01-13 PROCEDURE — 99214 OFFICE O/P EST MOD 30 MIN: CPT | Mod: 25 | Performed by: NURSE PRACTITIONER

## 2025-01-13 PROCEDURE — 90715 TDAP VACCINE 7 YRS/> IM: CPT | Performed by: NURSE PRACTITIONER

## 2025-01-13 PROCEDURE — 80306 DRUG TEST PRSMV INSTRMNT: CPT | Performed by: NURSE PRACTITIONER

## 2025-01-13 PROCEDURE — 90472 IMMUNIZATION ADMIN EACH ADD: CPT | Performed by: NURSE PRACTITIONER

## 2025-01-13 PROCEDURE — 90656 IIV3 VACC NO PRSV 0.5 ML IM: CPT | Performed by: NURSE PRACTITIONER

## 2025-01-13 ASSESSMENT — PAIN SCALES - GENERAL: PAINLEVEL_OUTOF10: NO PAIN (0)

## 2025-01-13 NOTE — PROGRESS NOTES
Assessment & Plan     Intractable migraine without status migrainosus, unspecified migraine type  -Discussed options.  Including physical therapy with craniosacral techniques for headache improvement, and neck focal rehabilitation.  Patient is agreement.  Continue healthy home stretching.  Monitoring as patient has rare migraine.  - Physical Therapy  Referral; Future    Neck pain  As above.  - Physical Therapy  Referral; Future    Elevated rheumatoid factor  History of elevated rheumatoid factor.  Patient denies any joint pain or swelling/effusions.  Continue to monitor.    Attention deficit hyperactivity disorder (ADHD), combined type  Chronic, stable.  Signed CSA today.  Updating urine toxicology.  Continue to increase Vyvanse to 20 mg.  Follow-up with ADHD in 1 to 2 months.  - Urine Drug Screen [facility]            MEDICATIONS:        - Continue other medications without change    The longitudinal plan of care for the diagnosis(es)/condition(s) as documented were addressed during this visit. Due to the added complexity in care, I will continue to support Alyssa in the subsequent management and with ongoing continuity of care.     Subjective   Alyssa is a 44 year old, presenting for the following health issues:  A.D.H.D and Recheck Medication        1/13/2025     9:15 AM   Additional Questions   Roomed by Vicki RODRIGUEZ   Accompanied by None         1/13/2025     9:15 AM   Patient Reported Additional Medications   Patient reports taking the following new medications NA     Follow up on ADHD- was going to increase her Vyvanse to 20, however had early morning migraine that woke her up at approximately 4 AM.  She was wondering if the medication dose was contributing to her headache.  Patient had been on Vyvanse 10 mg for over a month prior without issue.  She has a history of migraines.  This 1 lasted almost a whole day.  She has associated neck pain from some mild disc disease.  She denies radicular  "symptoms.  She is back to her normal ADLs today.    Patient comes in today because she was hesitant on increasing the Vyvanse to 20 mg.  She is due for a urinary toxicology and CSA agreement.  Patient is using recreational marijuana at at bedtime.    HPI         Medication Followup of Vyvanse   Taking Medication as prescribed: NO-stopped 1 week ago   Side Effects:  headaches, vomiting   Medication Helping Symptoms:  yes          Review of Systems  Constitutional, neuro, ENT, endocrine, pulmonary, cardiac, gastrointestinal, genitourinary, musculoskeletal, integument and psychiatric systems are negative, except as otherwise noted.      Objective    /74   Pulse 84   Temp 98  F (36.7  C) (Temporal)   Resp 12   Ht 1.705 m (5' 7.13\")   Wt 72.4 kg (159 lb 11.2 oz)   LMP  (LMP Unknown)   SpO2 100%   Breastfeeding No   BMI 24.92 kg/m    Body mass index is 24.92 kg/m .  Physical Exam   GENERAL: alert and no distress  EYES: Eyes grossly normal to inspection, PERRL and conjunctivae and sclerae normal  HENT: ear canals and TM's normal, nose and mouth without ulcers or lesions  NECK: no adenopathy, no asymmetry, masses, or scars  RESP: lungs clear to auscultation - no rales, rhonchi or wheezes  CV: regular rate and rhythm, normal S1 S2, no S3 or S4, no murmur, click or rub, no peripheral edema  MS: Mild decreased range of motion with neck rotation and lateral extension.  No vertebral tenderness.  Has some trapezius muscle tightness on her left side noted.  Upper extremity push pull strength is normal.  Normal sensation.  NEURO: Normal strength and tone, mentation intact and speech normal, no gross deficits.  Radial reflexes equal.  PSYCH: mentation appears normal, affect normal/bright            Signed Electronically by: EMMA Langley CNP    "

## 2025-01-13 NOTE — LETTER

## 2025-01-15 ENCOUNTER — THERAPY VISIT (OUTPATIENT)
Dept: PHYSICAL THERAPY | Facility: CLINIC | Age: 45
End: 2025-01-15
Attending: NURSE PRACTITIONER
Payer: COMMERCIAL

## 2025-01-15 DIAGNOSIS — M54.2 NECK PAIN: ICD-10-CM

## 2025-01-15 DIAGNOSIS — G43.919 INTRACTABLE MIGRAINE WITHOUT STATUS MIGRAINOSUS, UNSPECIFIED MIGRAINE TYPE: ICD-10-CM

## 2025-01-15 NOTE — PROGRESS NOTES
PHYSICAL THERAPY EVALUATION  Type of Visit: Evaluation       Fall Risk Screen:  Fall screen completed by: PT  Have you fallen 2 or more times in the past year?: No  Have you fallen and had an injury in the past year?: No  Is patient a fall risk?: No    Subjective         Presenting condition or subjective complaint: my neck gets to a point where I can not move sometimes. WIth heat and rest it gets better, but usually lasts a little less than a week.  Date of onset: (P) 01/07/25    Relevant medical history: Asthma   Dates & types of surgery: I had 2 C-sections, a couple hernia surgeries.    Prior diagnostic imaging/testing results:       Prior therapy history for the same diagnosis, illness or injury: No      Prior Level of Function  Transfers: Independent  Ambulation: Independent  ADL: Independent  IADL:     Living Environment  Social support: With a significant other or spouse   Type of home: House   Stairs to enter the home: Yes 2 Is there a railing: No     Ramp: No   Stairs inside the home: Yes 25 Is there a railing: Yes     Help at home: None  Equipment owned:       Employment: Yes   Hobbies/Interests: Coloring, [employer] [place], going out to dinner, cooking    Patient goals for therapy: Live without pain.    Pain assessment: See objective evaluation for additional pain details     Objective   CERVICAL SPINE EVALUATION    Work mechanical stresses:  - desk work  Functional disability score (NDI):  24%  VAS score (0-10): 0/10 now; at worst 9/10    ADDITIONAL HISTORY:  Present symptoms:  Patient notes that she has intermittent neck pain along the spine, can feel a bulge in the same location, and then will lose her motion. This occurs about 1 time a month.  Migraine headaches come 3-4 times a year.  This last time, the HA came first then the bulge along her cervical spine.  The R sided frontal headache started 1/7/2025, and last for about 5 days.  Needs to heat, massage, and rest to  alleviate  the symptoms.  Pain quality: Sharp  Paresthesia (yes/no):  intermittent numbness for the ring and little fingers of the R hand (frequent - but not daily)    Symptoms (improving/unchanging/worsening):  worsening  Symptoms commenced as a result of: unknown   Condition occurred in the following environment:  unknown    Symptoms at onset (neck/arm/forearm/headache): neck  Constant symptoms (neck/arm/forearm/headache):   Intermittent symptoms (neck/arm/forearm/headache): HA, neck pain, numbness for the R hand    Symptoms are made worse with the following: Sometimes Bending, Sometimes Sitting, Always Turning, time of day - No effect, Sometimes On the move.  Symptoms are made better with the following: Always Lying and Always When still, heat and Ibuprofen    Disturbed sleep (yes/no): yes (hard to sleep when symptoms are present)  Number of pillows: none when prone  Sleeping postures (prone/sup/side R/L): prone    Year of first episode: 2022    Previous history: headache - frontal this last episode.  Not sure if always on the R side when Migraine comes on  Previous treatments: medication.     Specific Questions: (as reported by the patient)  Dizziness/Tinnitus/Nausea/Swallowing (pos/neg): neg  Gait/Upper Limbs (normal/abnormal): normal  Medications (nil/NSAIDS/anlag/steroids/anticoag/other):  birth control, Zarxio, Vivanse  Medical allergies:  [place], [place]  General health (excellent/good/fair/poor):  good  Imaging (None/Xray/MRI/Other):  MRI  Recent or major surgery (yes/no): none  Night pain (yes/no): no  Accidents (yes/no): no  Unexplained weight loss (yes/no): no  Barriers at home: no  Other red flags: no    Postural Observation:   Sitting: Neutral  Protruded head: No Lateral deviation:  Nil.  Change of posture: No effect, support feels good  Wry Neck: Nil  Other observations/functional baselines:      Neurological:  Motor Deficit:  5/5 B UE   Reflexes:  symmetrical and brisk  Sensory Deficit:  not  assessed   Neurodynamic tests:  not assessed    Movement Loss:   See Mod Min Nil Symptoms   Protrusion    x NE   Flexion    x NE   Retraction    x NE   Extension   45  Along the spine   Lateral flexion R   43  Stretch on L    Lateral flexion L  28   Pain on the R   Rotation R   72  Strain on the L   Rotation L   68  Strain on the L than     Test Movements:   During: produces, abolishes, increases, decreases, no effect, centralizing, peripheralizing  After: better, worse, no better, no worse, no effect, centralized, peripheralized    Symptomatic response Mechanical response    During testing After testing Effect - increased ROM, decreased ROM, or key functional test No Effect   Pretest symptoms sitting: Limited ROM, no symptoms at rest     Rep PRO       Rep RET No Effect    No Better    Inc B Rotation and neck ext, did not reassess lateral bending    Rep RET EXT                Pretest symptoms lying:  not assessed   During testing After testing Effect - increased ROM, decreased ROM, or key functional test No Effect   Rep RET       Rep RET EXT         If required, pretest symptoms sitting:  not assessed    During testing After testing Effect - increased ROM, decreased ROM, or key functional test No Effect   Rep LF-R       Rep LF-L       Rep ROT-R       Rep ROT-L       Rep FLEX         Static Tests:   Retraction:  discussed trial should she develop a headache; hold for up to 3 minutes.        Other Tests: none    Provisional Classification:  Derangement - Asymmetrical, unilateral, symptoms above elbow    Potential Drivers of Pain and/or Disability: Comorbidities    Principle of Management:  Education:  discussed use of lumbar roll to assist with sitting posture.  Assessing neck retraction as improved neck ROM.  Discussed possible hold the end range retraction position should she develop a headache.  Had patient trial sleeping postures with use of a rolled towel to support her neck and avoid neck protrusion      Equipment provided:  no  Mechanical therapy (Y/N):  yes   Extension principle:  repeated retraction with self o/p 10 reps, every 2 hrs (6-8 times a day)   Lateral principle:    Flexion principle:     Other:      Assessment & Plan   CLINICAL IMPRESSIONS  Medical Diagnosis: Intractable migraine without status migrainosus, unspecified migraine typez, neck pain    Treatment Diagnosis: neck pain, Intractable migraine without status migrainosus, unspecified migraine type   Impression/Assessment: Patient is a 44 year old female with neck and Migraine HA complaints.  The following significant findings have been identified: Pain, Decreased ROM/flexibility, Decreased activity tolerance, and Impaired posture. These impairments interfere with their ability to perform self care tasks, work tasks, and driving  as compared to previous level of function.     Clinical Decision Making (Complexity):  Clinical Presentation: Stable/Uncomplicated  Clinical Presentation Rationale: based on medical and personal factors listed in PT evaluation  Clinical Decision Making (Complexity): Low complexity    PLAN OF CARE  Treatment Interventions:  Interventions: Manual Therapy, Neuromuscular Re-education, Therapeutic Activity, Therapeutic Exercise, Self-Care/Home Management    Long Term Goals     PT Goal 1  Goal Identifier: (P) ROM  Goal Description: (P) patient will have improved neck rotation to 80 deg in both directions, wth strain at end range only  Rationale: (P) to maximize safety and independence with performance of ADLs and functional tasks;to maximize safety and independence within the home;to maximize safety and independence within the community;to maximize safety and independence with transportation;to maximize safety and independence with self cares  Goal Progress: (P) Rot R 72 deg, Rot L 68 deg with strain limiting motion  Target Date: (P) 02/12/25  PT Goal 2  Goal Identifier: (P) pain  Goal Description: (P) patient will not  experience neck pain, or numbness R UE over the next 8 weeks; if symptoms occur, will be able to self manage her symptoms  Rationale: (P) to maximize safety and independence with performance of ADLs and functional tasks;to maximize safety and independence within the home;to maximize safety and independence within the community;to maximize safety and independence with self cares  Goal Progress: (P) Symptoms can cause her to rest and has a decrease in neck RO, symptoms up to 9/10M  Target Date: (P) 03/12/25      Frequency of Treatment: (P) 1 time a week for 4 weeks  Duration of Treatment: (P) then every other week for 4 weeks    Recommended Referrals to Other Professionals:   Education Assessment:   Learner/Method: (P) Patient;No Barriers to Learning;Pictures/Video;Demonstration;Reading;Listening  Education Comments: (P) PTRx on phone    Risks and benefits of evaluation/treatment have been explained.   Patient/Family/caregiver agrees with Plan of Care.     Evaluation Time:     PT Eval, Low Complexity Minutes (64652): (P) 25  Evaluation Only     Signing Clinician: Nidia Jennings PT

## 2025-01-23 ENCOUNTER — OFFICE VISIT (OUTPATIENT)
Dept: DERMATOLOGY | Facility: CLINIC | Age: 45
End: 2025-01-23
Attending: NURSE PRACTITIONER
Payer: COMMERCIAL

## 2025-01-23 DIAGNOSIS — D23.9 DERMAL NEVUS: ICD-10-CM

## 2025-01-23 DIAGNOSIS — L82.1 SEBORRHEIC KERATOSES: ICD-10-CM

## 2025-01-23 DIAGNOSIS — D18.01 ANGIOMA OF SKIN: ICD-10-CM

## 2025-01-23 DIAGNOSIS — Z80.8 FAMILY HISTORY OF MELANOMA: ICD-10-CM

## 2025-01-23 DIAGNOSIS — L81.4 LENTIGO: ICD-10-CM

## 2025-01-23 DIAGNOSIS — D22.9 MULTIPLE BENIGN NEVI: Primary | ICD-10-CM
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
## 2025-01-28 ENCOUNTER — THERAPY VISIT (OUTPATIENT)
Dept: PHYSICAL THERAPY | Facility: CLINIC | Age: 45
End: 2025-01-28
Payer: COMMERCIAL

## 2025-01-28 DIAGNOSIS — M54.2 NECK PAIN: Primary | ICD-10-CM

## 2025-01-28 DIAGNOSIS — G43.919 INTRACTABLE MIGRAINE WITHOUT STATUS MIGRAINOSUS, UNSPECIFIED MIGRAINE TYPE: ICD-10-CM

## 2025-01-28 PROCEDURE — 97110 THERAPEUTIC EXERCISES: CPT | Mod: GP | Performed by: PHYSICAL THERAPIST

## 2025-01-28 PROCEDURE — 97140 MANUAL THERAPY 1/> REGIONS: CPT | Mod: GP | Performed by: PHYSICAL THERAPIST

## 2025-03-11 PROBLEM — G43.919 INTRACTABLE MIGRAINE WITHOUT STATUS MIGRAINOSUS, UNSPECIFIED MIGRAINE TYPE: Status: RESOLVED | Noted: 2025-01-15 | Resolved: 2025-03-11

## 2025-03-11 PROBLEM — M54.2 NECK PAIN: Status: RESOLVED | Noted: 2023-10-31 | Resolved: 2025-03-11

## 2025-04-14 ENCOUNTER — LAB (OUTPATIENT)
Dept: LAB | Facility: CLINIC | Age: 45
End: 2025-04-14
Payer: COMMERCIAL

## 2025-04-14 DIAGNOSIS — D70.8 AUTOIMMUNE NEUTROPENIA: ICD-10-CM

## 2025-04-14 LAB
BASOPHILS # BLD AUTO: 0 10E3/UL (ref 0–0.2)
BASOPHILS NFR BLD AUTO: 1 %
DACRYOCYTES BLD QL SMEAR: SLIGHT
EOSINOPHIL # BLD AUTO: 0.1 10E3/UL (ref 0–0.7)
EOSINOPHIL NFR BLD AUTO: 4 %
ERYTHROCYTE [DISTWIDTH] IN BLOOD BY AUTOMATED COUNT: 11.8 % (ref 10–15)
HCT VFR BLD AUTO: 39.2 % (ref 35–47)
HGB BLD-MCNC: 13.1 G/DL (ref 11.7–15.7)
IMM GRANULOCYTES # BLD: 0 10E3/UL
IMM GRANULOCYTES NFR BLD: 0 %
LYMPHOCYTES # BLD AUTO: 0.9 10E3/UL (ref 0.8–5.3)
LYMPHOCYTES NFR BLD AUTO: 50 %
MCH RBC QN AUTO: 31.3 PG (ref 26.5–33)
MCHC RBC AUTO-ENTMCNC: 33.4 G/DL (ref 31.5–36.5)
MCV RBC AUTO: 94 FL (ref 78–100)
MONOCYTES # BLD AUTO: 0.2 10E3/UL (ref 0–1.3)
MONOCYTES NFR BLD AUTO: 12 %
NEUTROPHILS # BLD AUTO: 0.6 10E3/UL (ref 1.6–8.3)
NEUTROPHILS NFR BLD AUTO: 34 %
NRBC # BLD AUTO: 0 10E3/UL
NRBC BLD AUTO-RTO: 0 /100
PLAT MORPH BLD: ABNORMAL
PLATELET # BLD AUTO: 195 10E3/UL (ref 150–450)
RBC # BLD AUTO: 4.18 10E6/UL (ref 3.8–5.2)
RBC MORPH BLD: ABNORMAL
WBC # BLD AUTO: 1.8 10E3/UL (ref 4–11)

## 2025-04-14 PROCEDURE — 36415 COLL VENOUS BLD VENIPUNCTURE: CPT

## 2025-04-14 PROCEDURE — 85025 COMPLETE CBC W/AUTO DIFF WBC: CPT

## 2025-04-23 ENCOUNTER — PATIENT OUTREACH (OUTPATIENT)
Dept: CARE COORDINATION | Facility: CLINIC | Age: 45
End: 2025-04-23
Payer: COMMERCIAL

## 2025-05-07 ENCOUNTER — PATIENT OUTREACH (OUTPATIENT)
Dept: CARE COORDINATION | Facility: CLINIC | Age: 45
End: 2025-05-07
Payer: COMMERCIAL

## 2025-06-06 ENCOUNTER — MYC REFILL (OUTPATIENT)
Dept: FAMILY MEDICINE | Facility: CLINIC | Age: 45
End: 2025-06-06
Payer: COMMERCIAL

## 2025-06-06 DIAGNOSIS — F90.2 ATTENTION DEFICIT HYPERACTIVITY DISORDER (ADHD), COMBINED TYPE: ICD-10-CM

## 2025-06-11 RX ORDER — LISDEXAMFETAMINE DIMESYLATE 10 MG/1
10 CAPSULE ORAL EVERY MORNING
Qty: 30 CAPSULE | Refills: 0 | Status: SHIPPED | OUTPATIENT
Start: 2025-06-11

## 2025-07-12 ENCOUNTER — HEALTH MAINTENANCE LETTER (OUTPATIENT)
Age: 45
End: 2025-07-12

## 2025-07-22 DIAGNOSIS — D70.8 AUTOIMMUNE NEUTROPENIA: ICD-10-CM

## 2025-07-22 NOTE — TELEPHONE ENCOUNTER
SUBJECTIVE/OBJECTIVE:                                                      Refill request received for:  ZARXIO 300 MCG/0.5ML syringe    Date of last refill: 6/30/25  Date of LOV r/t Medication: 7/17/24  Future appt scheduled? Yes: 8/11/25   Date refill request was received: 7/22/25    RECENT LABS / VITALS                                                      CBC RESULTS:   Recent Labs   Lab Test 04/14/25  0907   WBC 1.8*   RBC 4.18   HGB 13.1   HCT 39.2   MCV 94   MCH 31.3   MCHC 33.4   RDW 11.8         Recent Labs   Lab Test 04/14/25  0907   DTYP  --    NEUTROPHIL 34   LYMPH 50   MONOCYTE 12   EOSINOPHIL 4   BASOPHIL 1   ANEU 0.6*   ALYM 0.9   KAVITHA 0.2    < > = values in this interval not displayed.     BP Readings from Last 4 Encounters:   01/13/25 120/74   08/12/24 120/69   07/17/24 103/65   05/23/24 100/64     PLAN:                                                      Refill request sent to provider to review and advise.    Wilbert Novak RN on 7/22/2025 at 10:30 AM

## 2025-07-23 RX ORDER — FILGRASTIM-SNDZ 300 UG/.5ML
INJECTION, SOLUTION INTRAVENOUS; SUBCUTANEOUS
Qty: 2 ML | Refills: 11 | Status: SHIPPED | OUTPATIENT
Start: 2025-07-23

## 2025-08-05 ENCOUNTER — DOCUMENTATION ONLY (OUTPATIENT)
Dept: LAB | Facility: CLINIC | Age: 45
End: 2025-08-05
Payer: COMMERCIAL

## 2025-08-05 DIAGNOSIS — D70.8 AUTOIMMUNE NEUTROPENIA: Primary | ICD-10-CM

## 2025-09-03 ENCOUNTER — LAB (OUTPATIENT)
Dept: INFUSION THERAPY | Facility: CLINIC | Age: 45
End: 2025-09-03
Attending: INTERNAL MEDICINE
Payer: COMMERCIAL

## 2025-09-03 ENCOUNTER — ONCOLOGY VISIT (OUTPATIENT)
Dept: ONCOLOGY | Facility: CLINIC | Age: 45
End: 2025-09-03
Attending: INTERNAL MEDICINE
Payer: COMMERCIAL

## 2025-09-03 VITALS
WEIGHT: 149.8 LBS | HEART RATE: 68 BPM | OXYGEN SATURATION: 99 % | DIASTOLIC BLOOD PRESSURE: 69 MMHG | SYSTOLIC BLOOD PRESSURE: 103 MMHG | RESPIRATION RATE: 16 BRPM | BODY MASS INDEX: 23.37 KG/M2 | TEMPERATURE: 99 F

## 2025-09-03 DIAGNOSIS — D70.8 AUTOIMMUNE NEUTROPENIA: ICD-10-CM

## 2025-09-03 DIAGNOSIS — Z13.29 SCREENING FOR THYROID DISORDER: ICD-10-CM

## 2025-09-03 LAB
BASOPHILS # BLD AUTO: <0.03 10E3/UL (ref 0–0.2)
BASOPHILS NFR BLD AUTO: 0.3 %
EOSINOPHIL # BLD AUTO: 0.03 10E3/UL (ref 0–0.7)
EOSINOPHIL NFR BLD AUTO: 0.9 %
ERYTHROCYTE [DISTWIDTH] IN BLOOD BY AUTOMATED COUNT: 12.2 % (ref 10–15)
HCT VFR BLD AUTO: 38.1 % (ref 35–47)
HGB BLD-MCNC: 13.2 G/DL (ref 11.7–15.7)
HOLD SPECIMEN: NORMAL
IMM GRANULOCYTES # BLD: <0.03 10E3/UL
IMM GRANULOCYTES NFR BLD: 0 %
LYMPHOCYTES # BLD AUTO: 0.76 10E3/UL (ref 0.8–5.3)
LYMPHOCYTES NFR BLD AUTO: 23.5 %
MCH RBC QN AUTO: 32 PG (ref 26.5–33)
MCHC RBC AUTO-ENTMCNC: 34.6 G/DL (ref 31.5–36.5)
MCV RBC AUTO: 92.3 FL (ref 78–100)
MONOCYTES # BLD AUTO: 0.22 10E3/UL (ref 0–1.3)
MONOCYTES NFR BLD AUTO: 6.8 %
NEUTROPHILS # BLD AUTO: 2.21 10E3/UL (ref 1.6–8.3)
NEUTROPHILS NFR BLD AUTO: 68.5 %
NRBC # BLD AUTO: <0.03 10E3/UL
NRBC BLD AUTO-RTO: 0 /100
PLATELET # BLD AUTO: 191 10E3/UL (ref 150–450)
RBC # BLD AUTO: 4.13 10E6/UL (ref 3.8–5.2)
TSH SERPL DL<=0.005 MIU/L-ACNC: 1.65 UIU/ML (ref 0.3–4.2)
WBC # BLD AUTO: 3.23 10E3/UL (ref 4–11)

## 2025-09-03 PROCEDURE — 99213 OFFICE O/P EST LOW 20 MIN: CPT | Performed by: INTERNAL MEDICINE

## 2025-09-03 PROCEDURE — 84443 ASSAY THYROID STIM HORMONE: CPT

## 2025-09-03 PROCEDURE — 85048 AUTOMATED LEUKOCYTE COUNT: CPT

## 2025-09-03 PROCEDURE — 36415 COLL VENOUS BLD VENIPUNCTURE: CPT

## 2025-09-03 RX ORDER — FILGRASTIM-SNDZ 300 UG/.5ML
INJECTION, SOLUTION INTRAVENOUS; SUBCUTANEOUS
Qty: 2 ML | Refills: 11 | Status: SHIPPED | OUTPATIENT
Start: 2025-09-03

## 2025-09-03 ASSESSMENT — PAIN SCALES - GENERAL: PAINLEVEL_OUTOF10: NO PAIN (0)
